# Patient Record
Sex: MALE | Race: WHITE | NOT HISPANIC OR LATINO | Employment: OTHER | URBAN - NONMETROPOLITAN AREA
[De-identification: names, ages, dates, MRNs, and addresses within clinical notes are randomized per-mention and may not be internally consistent; named-entity substitution may affect disease eponyms.]

---

## 2017-01-16 ENCOUNTER — RESULTS ENCOUNTER (OUTPATIENT)
Dept: FAMILY MEDICINE CLINIC | Facility: CLINIC | Age: 57
End: 2017-01-16

## 2017-01-16 DIAGNOSIS — E03.9 ACQUIRED HYPOTHYROIDISM: ICD-10-CM

## 2017-01-18 ENCOUNTER — RESULTS ENCOUNTER (OUTPATIENT)
Dept: ENDOCRINOLOGY | Age: 57
End: 2017-01-18

## 2017-01-18 DIAGNOSIS — E03.8 SECONDARY HYPOTHYROIDISM: ICD-10-CM

## 2017-01-24 ENCOUNTER — OFFICE VISIT (OUTPATIENT)
Dept: FAMILY MEDICINE CLINIC | Facility: CLINIC | Age: 57
End: 2017-01-24

## 2017-01-24 ENCOUNTER — RESULTS ENCOUNTER (OUTPATIENT)
Dept: FAMILY MEDICINE CLINIC | Facility: CLINIC | Age: 57
End: 2017-01-24

## 2017-01-24 VITALS
DIASTOLIC BLOOD PRESSURE: 64 MMHG | TEMPERATURE: 97.6 F | HEART RATE: 74 BPM | SYSTOLIC BLOOD PRESSURE: 110 MMHG | WEIGHT: 170.4 LBS | BODY MASS INDEX: 25.24 KG/M2 | OXYGEN SATURATION: 95 % | HEIGHT: 69 IN

## 2017-01-24 DIAGNOSIS — E55.9 VITAMIN D DEFICIENCY: ICD-10-CM

## 2017-01-24 DIAGNOSIS — J43.1 PANLOBULAR EMPHYSEMA (HCC): Primary | ICD-10-CM

## 2017-01-24 DIAGNOSIS — F41.0 PANIC ATTACK: ICD-10-CM

## 2017-01-24 DIAGNOSIS — E78.5 DYSLIPIDEMIA: ICD-10-CM

## 2017-01-24 DIAGNOSIS — M62.838 MUSCLE SPASMS OF NECK: ICD-10-CM

## 2017-01-24 PROCEDURE — 99214 OFFICE O/P EST MOD 30 MIN: CPT | Performed by: NURSE PRACTITIONER

## 2017-01-24 RX ORDER — LEVOTHYROXINE SODIUM 88 UG/1
88 TABLET ORAL DAILY
COMMUNITY
End: 2017-02-27 | Stop reason: SDUPTHER

## 2017-01-24 RX ORDER — ALBUTEROL SULFATE 90 UG/1
2 AEROSOL, METERED RESPIRATORY (INHALATION) EVERY 4 HOURS PRN
Qty: 18 G | Refills: 11 | Status: SHIPPED | OUTPATIENT
Start: 2017-01-24 | End: 2017-07-18 | Stop reason: CLARIF

## 2017-01-24 RX ORDER — BACLOFEN 10 MG/1
10 TABLET ORAL 3 TIMES DAILY
Qty: 45 TABLET | Refills: 0 | Status: SHIPPED | OUTPATIENT
Start: 2017-01-24 | End: 2017-05-05

## 2017-01-24 RX ORDER — ALBUTEROL SULFATE 2.5 MG/3ML
SOLUTION RESPIRATORY (INHALATION)
COMMUNITY
Start: 2017-01-18 | End: 2018-06-05 | Stop reason: SDUPTHER

## 2017-01-24 RX ORDER — ALPRAZOLAM 0.25 MG/1
0.25 TABLET ORAL DAILY PRN
Qty: 15 TABLET | Refills: 0 | Status: SHIPPED | OUTPATIENT
Start: 2017-01-24 | End: 2017-05-05

## 2017-01-24 NOTE — MR AVS SNAPSHOT
Nguyễn Santana   1/24/2017 11:30 AM   Office Visit    Dept Phone:  [phone]   Encounter #:  [id number]    Provider:  SHANT Garcia   Department:  [facility] FAMILY MEDICINE                Your Full Care Plan              Today's Medication Changes          These changes are accurate as of: 1/24/17 12:37 PM.  If you have any questions, ask your nurse or doctor.               New Medication(s)Ordered:     ALPRAZolam 0.25 MG tablet   Commonly known as:  XANAX   Take 1 tablet by mouth Daily As Needed for anxiety.   Started by:  SHANT Garcia       baclofen 10 MG tablet   Commonly known as:  LIORESAL   Take 1 tablet by mouth 3 (Three) Times a Day.   Started by:  SHANT Garcia         Medication(s)that have changed:     SYNTHROID 88 MCG tablet   Generic drug:  levothyroxine   Take 88 mcg by mouth Daily.   What changed:  Another medication with the same name was removed. Continue taking this medication, and follow the directions you see here.   Changed by:  SHANT Garcia         Stop taking medication(s)listed here:     Ibuprofen 200 MG capsule   Stopped by:  SHANT Garcia           ipratropium-albuterol 0.5-2.5 mg/mL nebulizer   Commonly known as:  DUO-NEB   Stopped by:  SHANT Garcia                Where to Get Your Medications      These medications were sent to [address] [address] [facility] - [phone]  - [phone]   [address] [facility] [address]     Phone:  [phone]     albuterol 108 (90 BASE) MCG/ACT inhaler    baclofen 10 MG tablet         You can get these medications from any pharmacy     Bring a paper prescription for each of these medications     ALPRAZolam 0.25 MG tablet                  Your Updated Medication List          This list is  accurate as of: 1/24/17 12:37 PM.  Always use your most recent med list.                * albuterol 2 MG tablet   Commonly known as:  PROVENTIL       * albuterol (2.5 MG/3ML) 0.083% nebulizer solution   Commonly known as:  PROVENTIL       * albuterol 108 (90 BASE) MCG/ACT inhaler   Commonly known as:  PROVENTIL HFA;VENTOLIN HFA   Inhale 2 puffs Every 4 (Four) Hours As Needed for wheezing.       ALPRAZolam 0.25 MG tablet   Commonly known as:  XANAX   Take 1 tablet by mouth Daily As Needed for anxiety.       baclofen 10 MG tablet   Commonly known as:  LIORESAL   Take 1 tablet by mouth 3 (Three) Times a Day.       budesonide-formoterol 160-4.5 MCG/ACT inhaler   Commonly known as:  SYMBICORT       Cetirizine HCl 10 MG capsule       ergocalciferol 99587 UNITS capsule   Commonly known as:  DRISDOL   Take 1 capsule by mouth 1 (one) time per week.       fluticasone-salmeterol 250-50 MCG/DOSE DISKUS   Commonly known as:  ADVAIR       liothyronine 25 MCG tablet   Commonly known as:  CYTOMEL   Take 1/2 tab twice daily       montelukast 10 MG tablet   Commonly known as:  SINGULAIR       Nebulizer device       promethazine-dextromethorphan 6.25-15 MG/5ML syrup   Commonly known as:  PROMETHAZINE-DM       simvastatin 20 MG tablet   Commonly known as:  ZOCOR   Take 1 tablet by mouth every evening.       SPIRIVA HANDIHALER 18 MCG per inhalation capsule   Generic drug:  tiotropium       SYNTHROID 88 MCG tablet   Generic drug:  levothyroxine       * Notice:  This list has 3 medication(s) that are the same as other medications prescribed for you. Read the directions carefully, and ask your doctor or other care provider to review them with you.            We Performed the Following     Compliance Drug Analysis, Ur       You Were Diagnosed With        Codes Comments    Panlobular emphysema    -  Primary ICD-10-CM: J43.1  ICD-9-CM: 492.8     Panic attack     ICD-10-CM: F41.0  ICD-9-CM: 300.01     Muscle spasms of neck     ICD-10-CM:  M62.838  ICD-9-CM: 728.85       Instructions     None    Patient Instructions History      Upcoming Appointments     Visit Type Date Time Department    OFFICE VISIT 2017 11:30 AM [facility]    [facility] 2017 10:10 AM [facility]    OFFICE VISIT 2017  2:40 PM [facility]    [facility] 2017 10:00 AM [facility]    OFFICE VISIT 2017  8:40 AM [facility]      MyChart Signup     [facility] [employer] allows you to send messages to your doctor, view your test results, renew your prescriptions, schedule appointments, and more. To sign up, go to [employer] and click on the Sign Up Now link in the New User? box. Enter your [employer] Activation Code exactly as it appears below along with the last four digits of your Social Security Number and your Date of Birth () to complete the sign-up process. If you do not sign up before the expiration date, you must request a new code.    [employer] Activation Code: [email or web address]  Expires: 2017 12:37 PM    If you have questions, you can email [email or web address] or call [phone] to talk to our [employer] staff. Remember, [employer] is NOT to be used for urgent needs. For medical emergencies, dial 911.               Other Info from Your Visit           Your Appointments     2017 10:10 AM EDT   [facility]   [facility] ENDOCRINOLOGY (--)    [address] [place] [address] [phone]            2017  2:40 PM EDT   Office Visit with Lion West MD   [facility] ENDOCRINOLOGY (--)    [address] [place] [address] [phone]           Arrive 15 minutes prior to appointment.            2017 10:00 AM EDT   [facility], [facility]   [facility] ENDOCRINOLOGY (--)    [address] [address] [phone]     "        Jul 19, 2017  8:40 AM EDT   Office Visit with SHANT Espinal   [facility] ENDOCRINOLOGY (--)    [address]  [facility] [address] [phone]           Arrive 15 minutes prior to appointment.              Allergies     No Known Allergies      Reason for Visit     Med Refill COPD, TSH, Vitamin D, and Cholesterol      Vital Signs     Blood Pressure Pulse Temperature Height Weight Oxygen Saturation    110/64 (BP Location: Right arm) 74 97.6 °F (36.4 °C) (Oral) 69\" (175.3 cm) 170 lb 6.4 oz (77.3 kg) 95%    Body Mass Index Smoking Status                25.16 kg/m2 Former Smoker          Problems and Diagnoses Noted     Chronic airway obstruction    Muscle spasms of neck    Panic attack        "

## 2017-01-24 NOTE — PROGRESS NOTES
Subjective   Nguyễn Santana is a 56 y.o. male. Patient is here for medication check/refill for COPD and TSH. He is fasting today. He states that his Pulmonologist has him on Oxygen only at night, this was done around thanksgiving. He seen him again last week and he put him back on the Albuterol 2 mg 4 times daily. Albuterol Nebulizer 2.5 mg 4 times daily. He has recently been diagnosed with Glaucoma.     History of Present Illness 56-year-old  male presents to the office today to follow-up on long-term medications needed for his COPD. Patient states his thyroid is being followed by his endocrinologist who is already ordered labs to be drawn at a later date. Patient is fasting today. States that his pulmonologist has taken him off his continuous nasal cannula O2 except for at night when he sleeps. He has been without daytime O2 since Thanksgiving. He did state that his albuterol had been increased to 2 mg 4 times a day and that he is using an albuterol nebulizer 2.5 mg 4 times a day. This keeps him breathing comfortably throughout the waking hours. Also shared with me that he has been recently diagnosed with glaucoma and is treating it with drops. States the main reason he is here is because he has started having panic attacks whenever he is exposed to upsetting odors such as gasoline, diesel fuel, and certain perfumes. States he becomes severely short of air breathing rapidly and sweating profusely. Would like a rapid acting medication that he can use sparingly during these attacks. Has had 2 attacks during the past 6 weeks. Patient states he does not want to take medicine on a daily basis unless absolutely necessary. Explained to him the need for medication agreement, Avinash and drug testing if fast acting narcotic was to be used. Also explained that dosing would be only a few tablets and that it must last greater than 30 days. He agreed to all these conditions.    The following portions of the  patient's history were reviewed and updated as appropriate: allergies, current medications, past family history, past medical history, past social history, past surgical history and problem list.    Review of Systems   Constitutional:        Vitamin D Deficiency.   HENT:        Muscle spasms in back of neck greater on the right side than left.   Respiratory:        COPD.   Cardiovascular:        Dyslipidemia.    Endocrine:        Hypothyroidism.   Psychiatric/Behavioral:        New-onset of panic attacks associated with the smell of gasoline, diesel fuel and certain perfumes   All other systems reviewed and are negative.      Objective   Physical Exam   Constitutional: He is oriented to person, place, and time. He appears well-developed and well-nourished.   HENT:   Head: Normocephalic and atraumatic.   Muscle spasm in right side of neck causing tense muscle running from base of skull down to scapula.   Eyes: EOM are normal. Pupils are equal, round, and reactive to light.   Neck: Normal range of motion.   Cardiovascular: Normal rate and regular rhythm.    Pulmonary/Chest: Effort normal.   Distant shallow breath sounds throughout lung fields   Abdominal: Soft. Bowel sounds are normal. He exhibits no distension.   Musculoskeletal: Normal range of motion.   Neurological: He is alert and oriented to person, place, and time.   Skin: Skin is warm and dry.   Psychiatric: He has a normal mood and affect. His behavior is normal. Judgment and thought content normal.   Nursing note and vitals reviewed.      Assessment/Plan   Nguyễn was seen today for med refill.    Diagnoses and all orders for this visit:    Panlobular emphysema  -     albuterol (PROVENTIL HFA;VENTOLIN HFA) 108 (90 BASE) MCG/ACT inhaler; Inhale 2 puffs Every 4 (Four) Hours As Needed for wheezing.    Panic attack  -     Compliance Drug Analysis, Ur; Future  -     ALPRAZolam (XANAX) 0.25 MG tablet; Take 1 tablet by mouth Daily As Needed for anxiety.  -      Compliance Drug Analysis, Ur    Muscle spasms of neck  -     baclofen (LIORESAL) 10 MG tablet; Take 1 tablet by mouth 3 (Three) Times a Day.    Vitamin D deficiency  -     Vitamin D 25 Hydroxy; Future    Dyslipidemia  -     Lipid Panel; Future      Kaspar drawn, urine test collected and medication agreement signed. Patient to use Xanax sparingly during times of panic. Discussed with patient that if he needs to use the Xanax on a more regular basis we will need to switch to a daily medication to help control anxiety. Anxiety is a normal part of advanced COPD due to air hunger. Renewed medication for Proventil for his shortness of breath and wheezing. Baclofen ordered for patient's muscle spasms in the back of his neck associated with frequent powerful coughing. Encouraged to return tomorrow for fasting labs. Patient left after giving urine sample and I called him at home to remind him of need for labs. To continue all medications as requested, eat a well-balanced diet with plenty of fruits and vegetables, drinking six-day glasses of water a day and ambulating as able. Discussed at length the need for adequate hydration due to his COPD.

## 2017-01-25 LAB
25(OH)D3+25(OH)D2 SERPL-MCNC: 45.9 NG/ML (ref 30–100)
CHOLEST SERPL-MCNC: 214 MG/DL (ref 0–200)
HDLC SERPL-MCNC: 48 MG/DL (ref 40–60)
LDLC SERPL CALC-MCNC: 138 MG/DL (ref 0–100)
TRIGL SERPL-MCNC: 141 MG/DL (ref 0–150)
VLDLC SERPL CALC-MCNC: 28.2 MG/DL (ref 5–40)

## 2017-01-28 LAB
DRUGS UR: NORMAL
Lab: NORMAL

## 2017-02-27 ENCOUNTER — TELEPHONE (OUTPATIENT)
Dept: ENDOCRINOLOGY | Age: 57
End: 2017-02-27

## 2017-02-27 RX ORDER — LEVOTHYROXINE SODIUM 88 UG/1
88 TABLET ORAL DAILY
Qty: 30 TABLET | Refills: 2 | Status: SHIPPED | OUTPATIENT
Start: 2017-02-27 | End: 2017-04-19

## 2017-02-27 NOTE — TELEPHONE ENCOUNTER
----- Message from Licha Ragland sent at 2/27/2017 10:21 AM EST -----  Contact: PATIENT  THE PATIENT TAKES SYNTHROID 88 MCG 1 TAB 1X DAILY 30 COUNT. HE IS ALMOST OUT AND HE DOESN'T HAVE ANOTHER APPT. UNTIL April WITH DR. PRICE. HE IS ASKING FOR A REFILL TO BE SENT TO HIS PHARMACY FOR THIS MEDICATION TO HOLD HIM UNTIL HIS APPT. ON 4/19/17. PLEASE SEND THIS TO:  SHIREEN PENA  [address] [facility]   [phone] (Phone)  [phone] (Fax)

## 2017-04-07 DIAGNOSIS — R53.82 CHRONIC FATIGUE: ICD-10-CM

## 2017-04-07 DIAGNOSIS — E03.8 OTHER SPECIFIED HYPOTHYROIDISM: Primary | ICD-10-CM

## 2017-04-07 DIAGNOSIS — E78.5 DYSLIPIDEMIA: ICD-10-CM

## 2017-04-07 DIAGNOSIS — E55.9 VITAMIN D DEFICIENCY: Primary | ICD-10-CM

## 2017-04-12 ENCOUNTER — LAB (OUTPATIENT)
Dept: ENDOCRINOLOGY | Age: 57
End: 2017-04-12

## 2017-04-12 DIAGNOSIS — R53.82 CHRONIC FATIGUE: ICD-10-CM

## 2017-04-12 DIAGNOSIS — E78.5 DYSLIPIDEMIA: ICD-10-CM

## 2017-04-12 DIAGNOSIS — E55.9 VITAMIN D DEFICIENCY: ICD-10-CM

## 2017-04-12 DIAGNOSIS — E03.8 OTHER SPECIFIED HYPOTHYROIDISM: ICD-10-CM

## 2017-04-13 LAB — 25(OH)D3+25(OH)D2 SERPL-MCNC: 38.5 NG/ML (ref 30–100)

## 2017-04-18 LAB
ACTH PLAS-MCNC: 26.7 PG/ML (ref 7.2–63.3)
ALBUMIN SERPL-MCNC: 4.5 G/DL (ref 3.5–5.2)
ALBUMIN/GLOB SERPL: 1.7 G/DL
ALP SERPL-CCNC: 89 U/L (ref 39–117)
ALT SERPL-CCNC: 15 U/L (ref 1–41)
AST SERPL-CCNC: 20 U/L (ref 1–40)
BILIRUB SERPL-MCNC: 0.7 MG/DL (ref 0.1–1.2)
BUN SERPL-MCNC: 14 MG/DL (ref 6–20)
BUN/CREAT SERPL: 12.1 (ref 7–25)
CALCIUM SERPL-MCNC: 9.7 MG/DL (ref 8.6–10.5)
CHLORIDE SERPL-SCNC: 101 MMOL/L (ref 98–107)
CHOLEST SERPL-MCNC: 207 MG/DL (ref 0–200)
CO2 SERPL-SCNC: 22 MMOL/L (ref 22–29)
CONV COMMENT: ABNORMAL
CORTIS SERPL-MCNC: 9.5 UG/DL
CREAT SERPL-MCNC: 1.16 MG/DL (ref 0.76–1.27)
ERYTHROCYTE [DISTWIDTH] IN BLOOD BY AUTOMATED COUNT: 13.2 % (ref 11.5–14.5)
GLOBULIN SER CALC-MCNC: 2.6 GM/DL
GLUCOSE SERPL-MCNC: 85 MG/DL (ref 65–99)
HCT VFR BLD AUTO: 43.8 % (ref 40.4–52.2)
HDLC SERPL-MCNC: 46 MG/DL (ref 40–60)
HGB BLD-MCNC: 14.9 G/DL (ref 13.7–17.6)
LDLC SERPL CALC-MCNC: 135 MG/DL (ref 0–100)
MCH RBC QN AUTO: 31.9 PG (ref 27–32.7)
MCHC RBC AUTO-ENTMCNC: 34 G/DL (ref 32.6–36.4)
MCV RBC AUTO: 93.8 FL (ref 79.8–96.2)
PLATELET # BLD AUTO: 276 10*3/MM3 (ref 140–500)
POTASSIUM SERPL-SCNC: 4.5 MMOL/L (ref 3.5–5.2)
PROT SERPL-MCNC: 7.1 G/DL (ref 6–8.5)
RBC # BLD AUTO: 4.67 10*6/MM3 (ref 4.6–6)
SHBG SERPL-SCNC: 97.6 NMOL/L (ref 19.3–76.4)
SODIUM SERPL-SCNC: 140 MMOL/L (ref 136–145)
T3FREE SERPL-MCNC: 2.8 PG/ML (ref 2–4.4)
T4 FREE SERPL-MCNC: 1.39 NG/DL (ref 0.93–1.7)
T4 SERPL-MCNC: 7.88 MCG/DL (ref 4.5–11.7)
TESTOST FREE SERPL-MCNC: 9.2 PG/ML (ref 7.2–24)
TESTOST SERPL-MCNC: 690 NG/DL (ref 348–1197)
THYROGLOB AB SERPL-ACNC: 1.9 IU/ML
THYROGLOB SERPL-MCNC: <2 NG/ML
THYROGLOB SERPL-MCNC: ABNORMAL NG/ML
TRIGL SERPL-MCNC: 131 MG/DL (ref 0–150)
TSH SERPL DL<=0.005 MIU/L-ACNC: 3.78 MIU/ML (ref 0.27–4.2)
URATE SERPL-MCNC: 4.6 MG/DL (ref 3.4–7)
VLDLC SERPL CALC-MCNC: 26.2 MG/DL (ref 5–40)
WBC # BLD AUTO: 9.24 10*3/MM3 (ref 4.5–10.7)

## 2017-04-19 ENCOUNTER — OFFICE VISIT (OUTPATIENT)
Dept: ENDOCRINOLOGY | Age: 57
End: 2017-04-19

## 2017-04-19 VITALS
BODY MASS INDEX: 25.53 KG/M2 | RESPIRATION RATE: 16 BRPM | SYSTOLIC BLOOD PRESSURE: 108 MMHG | DIASTOLIC BLOOD PRESSURE: 60 MMHG | HEIGHT: 69 IN | WEIGHT: 172.4 LBS

## 2017-04-19 DIAGNOSIS — E78.5 DYSLIPIDEMIA: ICD-10-CM

## 2017-04-19 DIAGNOSIS — E55.9 VITAMIN D DEFICIENCY: ICD-10-CM

## 2017-04-19 DIAGNOSIS — E03.8 OTHER SPECIFIED HYPOTHYROIDISM: Primary | ICD-10-CM

## 2017-04-19 PROCEDURE — 99214 OFFICE O/P EST MOD 30 MIN: CPT | Performed by: INTERNAL MEDICINE

## 2017-04-19 RX ORDER — LEVOTHYROXINE SODIUM 112 MCG
112 TABLET ORAL DAILY
Qty: 30 TABLET | Refills: 5 | Status: SHIPPED | OUTPATIENT
Start: 2017-04-19 | End: 2017-11-28

## 2017-04-19 RX ORDER — ROSUVASTATIN CALCIUM 40 MG/1
40 TABLET, COATED ORAL DAILY
Qty: 90 TABLET | Refills: 3 | Status: SHIPPED | OUTPATIENT
Start: 2017-04-19 | End: 2017-04-21 | Stop reason: SDUPTHER

## 2017-04-19 NOTE — PROGRESS NOTES
"Subjective   Nguynễ Santana is a 56 y.o. male seen for follow up for hypothyroidism, dyslipidemia, vit d deficiency, lab review. Patient states that Dahlia started him on Liothyronine and it did not work so he quit taking the medication. He denies any problems or concerns.     History of Present Illness this is a 56-year-old gentleman known patient with hypothyroidism and dyslipidemia and vitamin D deficiency and whole host of other upper respiratory and lung problems.  Over the course of last 6 months he has had no significant health problems for which to go to the emergency room or hospital.    /60  Resp 16  Ht 69\" (175.3 cm)  Wt 172 lb 6.4 oz (78.2 kg)  BMI 25.46 kg/m2     No Known Allergies    Current Outpatient Prescriptions:   •  albuterol (PROVENTIL HFA;VENTOLIN HFA) 108 (90 BASE) MCG/ACT inhaler, Inhale 2 puffs Every 4 (Four) Hours As Needed for wheezing., Disp: 18 g, Rfl: 11  •  albuterol (PROVENTIL) (2.5 MG/3ML) 0.083% nebulizer solution, , Disp: , Rfl:   •  albuterol (PROVENTIL) 2 MG tablet, 4 (Four) Times a Day., Disp: , Rfl: 5  •  ALPRAZolam (XANAX) 0.25 MG tablet, Take 1 tablet by mouth Daily As Needed for anxiety., Disp: 15 tablet, Rfl: 0  •  baclofen (LIORESAL) 10 MG tablet, Take 1 tablet by mouth 3 (Three) Times a Day., Disp: 45 tablet, Rfl: 0  •  budesonide-formoterol (SYMBICORT) 160-4.5 MCG/ACT inhaler, Inhale 2 puffs 2 (two) times a day., Disp: , Rfl:   •  Cetirizine HCl 10 MG capsule, Take by mouth., Disp: , Rfl:   •  ergocalciferol (DRISDOL) 55158 UNITS capsule, Take 1 capsule by mouth 1 (one) time per week., Disp: 13 capsule, Rfl: 3  •  fluticasone-salmeterol (ADVAIR) 250-50 MCG/DOSE DISKUS, 1 puff 2 (two) times a day., Disp: , Rfl:   •  levothyroxine (SYNTHROID) 88 MCG tablet, Take 1 tablet by mouth Daily., Disp: 30 tablet, Rfl: 2  •  montelukast (SINGULAIR) 10 MG tablet, Take 1 tablet by mouth daily., Disp: , Rfl:   •  promethazine-dextromethorphan (PROMETHAZINE-DM) 6.25-15 " MG/5ML syrup, Take by mouth. Take 5 ML every 4-6 hours as needed for cough, Disp: , Rfl:   •  Respiratory Therapy Supplies (NEBULIZER) device, , Disp: , Rfl:   •  simvastatin (ZOCOR) 20 MG tablet, Take 1 tablet by mouth every evening., Disp: 30 tablet, Rfl: 11  •  SPIRIVA HANDIHALER 18 MCG per inhalation capsule, , Disp: , Rfl: 5      The following portions of the patient's history were reviewed and updated as appropriate: allergies, current medications, past family history, past medical history, past social history, past surgical history and problem list.    Review of Systems   Constitutional: Negative.    HENT: Negative.    Eyes: Negative.    Respiratory: Negative.    Cardiovascular: Negative.    Gastrointestinal: Negative.    Endocrine: Negative.    Genitourinary: Negative.    Musculoskeletal: Negative.    Skin: Negative.    Allergic/Immunologic: Negative.    Neurological: Negative.    Hematological: Negative.    Psychiatric/Behavioral: Negative.        Objective   Physical Exam   Constitutional: He is oriented to person, place, and time. He appears well-developed and well-nourished. No distress.   Patient is on nasal O2.   HENT:   Head: Normocephalic and atraumatic.   Right Ear: External ear normal.   Left Ear: External ear normal.   Nose: Nose normal.   Mouth/Throat: Oropharynx is clear and moist. No oropharyngeal exudate.   Eyes: Conjunctivae and EOM are normal. Pupils are equal, round, and reactive to light. Right eye exhibits no discharge. Left eye exhibits no discharge. No scleral icterus.   Neck: Normal range of motion. Neck supple. No JVD present. No tracheal deviation present. Thyromegaly present.   Somewhat a nonhomogeneous enlargement of his thyroid to about 1-1/2 to twice normal size.   Cardiovascular: Normal rate, regular rhythm, normal heart sounds and intact distal pulses.  Exam reveals no gallop and no friction rub.    No murmur heard.  Pulmonary/Chest: Effort normal and breath sounds normal. No  stridor. No respiratory distress. He has no wheezes. He has no rales. He exhibits no tenderness.   Abdominal: Soft. Bowel sounds are normal. He exhibits no distension and no mass. There is no tenderness. There is no rebound and no guarding. No hernia.   Musculoskeletal: Normal range of motion. He exhibits no tenderness or deformity.   Lymphadenopathy:     He has no cervical adenopathy.   Neurological: He is alert and oriented to person, place, and time. He has normal reflexes. He displays normal reflexes. No cranial nerve deficit. He exhibits normal muscle tone. Coordination normal.   Skin: Skin is warm and dry. No rash noted. He is not diaphoretic. No erythema. No pallor.   Psychiatric: He has a normal mood and affect. His behavior is normal. Judgment and thought content normal.   Nursing note and vitals reviewed.     Results for orders placed or performed in visit on 04/12/17   Comprehensive Metabolic Panel   Result Value Ref Range    Glucose 85 65 - 99 mg/dL    BUN 14 6 - 20 mg/dL    Creatinine 1.16 0.76 - 1.27 mg/dL    eGFR Non African Am 66 >60 mL/min/1.73    eGFR African Am 80 >60 mL/min/1.73    BUN/Creatinine Ratio 12.1 7.0 - 25.0    Sodium 140 136 - 145 mmol/L    Potassium 4.5 3.5 - 5.2 mmol/L    Chloride 101 98 - 107 mmol/L    Total CO2 22.0 22.0 - 29.0 mmol/L    Calcium 9.7 8.6 - 10.5 mg/dL    Total Protein 7.1 6.0 - 8.5 g/dL    Albumin 4.50 3.50 - 5.20 g/dL    Globulin 2.6 gm/dL    A/G Ratio 1.7 g/dL    Total Bilirubin 0.7 0.1 - 1.2 mg/dL    Alkaline Phosphatase 89 39 - 117 U/L    AST (SGOT) 20 1 - 40 U/L    ALT (SGPT) 15 1 - 41 U/L   T3, Free   Result Value Ref Range    T3, Free 2.8 2.0 - 4.4 pg/mL   T4 & TSH ([facility])   Result Value Ref Range    TSH 3.780 0.270 - 4.200 mIU/mL    T4, Total 7.88 4.50 - 11.70 mcg/dL   T4, Free   Result Value Ref Range    Free T4 1.39 0.93 - 1.70 ng/dL   Lipid Panel   Result Value Ref Range    Total Cholesterol 207 (H) 0 - 200 mg/dL    Triglycerides 131 0 - 150 mg/dL    HDL  Cholesterol 46 40 - 60 mg/dL    VLDL Cholesterol 26.2 5 - 40 mg/dL    LDL Cholesterol  135 (H) 0 - 100 mg/dL   CBC (No Diff)   Result Value Ref Range    WBC 9.24 4.50 - 10.70 10*3/mm3    RBC 4.67 4.60 - 6.00 10*6/mm3    Hemoglobin 14.9 13.7 - 17.6 g/dL    Hematocrit 43.8 40.4 - 52.2 %    MCV 93.8 79.8 - 96.2 fL    MCH 31.9 27.0 - 32.7 pg    MCHC 34.0 32.6 - 36.4 g/dL    RDW 13.2 11.5 - 14.5 %    Platelets 276 140 - 500 10*3/mm3   Comprehensive Thyroglobulin   Result Value Ref Range    Thyroglobulin Ab 1.9 (H) IU/mL    Thyroglobulin Comment ng/mL    Thyroglobulin (TG-ELIGIO) <2.0 ng/mL   TestT+TestF+SHBG   Result Value Ref Range    Testosterone, Total 690 348 - 1197 ng/dL    Comment Comment     Testosterone, Free 9.2 7.2 - 24.0 pg/mL    Sex Hormone Binding Globulin 97.6 (H) 19.3 - 76.4 nmol/L   ACTH   Result Value Ref Range    ACTH 26.7 7.2 - 63.3 pg/mL   Cortisol   Result Value Ref Range    Cortisol 9.5 ug/dL   Uric Acid   Result Value Ref Range    Uric Acid 4.6 3.4 - 7.0 mg/dL           Assessment/Plan   Diagnoses and all orders for this visit:    Other specified hypothyroidism  -     T3, Free; Future  -     T4 & TSH ([facility]); Future  -     T4, Free; Future  -     Thyroglobulin With Anti-TG; Future  -     Uric Acid; Future  -     Vitamin D 25 Hydroxy; Future  -     Comprehensive Metabolic Panel; Future  -     Lipid Panel; Future    Vitamin D deficiency  -     T3, Free; Future  -     T4 & TSH ([facility]); Future  -     T4, Free; Future  -     Thyroglobulin With Anti-TG; Future  -     Uric Acid; Future  -     Vitamin D 25 Hydroxy; Future  -     Comprehensive Metabolic Panel; Future  -     Lipid Panel; Future    Dyslipidemia  -     T3, Free; Future  -     T4 & TSH ([facility]); Future  -     T4, Free; Future  -     Thyroglobulin With Anti-TG; Future  -     Uric Acid; Future  -     Vitamin D 25 Hydroxy; Future  -     Comprehensive Metabolic Panel; Future  -     Lipid Panel; Future    Other orders  -     SYNTHROID 112 MCG  tablet; Take 1 tablet by mouth Daily. On empty stomach  -     rosuvastatin (CRESTOR) 40 MG tablet; Take 1 tablet by mouth Daily.               In summary I saw and examined this 56-year-old gentleman for above-mentioned problems.  I reviewed his laboratory evaluation of 04/12/2017 and provided him and his wife who was present during this office visit with a hard copy of it.  Overall he is clinically and metabolically stable however his TSH level is high enough to increase his dose of Synthroid to 112 µg daily.  Also his total cholesterol and LDL remain elevated and therefore I am going to stop simvastatin is started him on Crestor 40 mg daily.  He will continue rest of his prescriptions.  I will see him in 6 months or sooner if needed with laboratory evaluation prior to each office visit.

## 2017-04-21 RX ORDER — ROSUVASTATIN CALCIUM 40 MG/1
40 TABLET, COATED ORAL DAILY
Qty: 90 TABLET | Refills: 3 | Status: SHIPPED | OUTPATIENT
Start: 2017-04-21 | End: 2017-09-05 | Stop reason: SDUPTHER

## 2017-05-05 ENCOUNTER — OFFICE VISIT (OUTPATIENT)
Dept: FAMILY MEDICINE CLINIC | Facility: CLINIC | Age: 57
End: 2017-05-05

## 2017-05-05 VITALS
SYSTOLIC BLOOD PRESSURE: 122 MMHG | TEMPERATURE: 97.6 F | BODY MASS INDEX: 25.36 KG/M2 | DIASTOLIC BLOOD PRESSURE: 86 MMHG | WEIGHT: 171.2 LBS | HEIGHT: 69 IN | OXYGEN SATURATION: 97 % | HEART RATE: 74 BPM

## 2017-05-05 DIAGNOSIS — J43.1 PANLOBULAR EMPHYSEMA (HCC): ICD-10-CM

## 2017-05-05 DIAGNOSIS — Z00.00 HEALTHCARE MAINTENANCE: Primary | ICD-10-CM

## 2017-05-05 PROCEDURE — 99213 OFFICE O/P EST LOW 20 MIN: CPT | Performed by: NURSE PRACTITIONER

## 2017-05-05 RX ORDER — ERGOCALCIFEROL 1.25 MG/1
50000 CAPSULE ORAL WEEKLY
COMMUNITY
End: 2017-05-12 | Stop reason: SDUPTHER

## 2017-05-12 RX ORDER — ERGOCALCIFEROL 1.25 MG/1
50000 CAPSULE ORAL WEEKLY
Qty: 24 CAPSULE | Refills: 0 | Status: SHIPPED | OUTPATIENT
Start: 2017-05-12 | End: 2017-07-28 | Stop reason: SDUPTHER

## 2017-05-12 RX ORDER — ERGOCALCIFEROL 1.25 MG/1
CAPSULE ORAL
Qty: 4 CAPSULE | Refills: 2 | OUTPATIENT
Start: 2017-05-12

## 2017-07-21 DIAGNOSIS — J43.9 PULMONARY EMPHYSEMA, UNSPECIFIED EMPHYSEMA TYPE (HCC): Primary | ICD-10-CM

## 2017-07-21 DIAGNOSIS — J44.9 CHRONIC OBSTRUCTIVE PULMONARY DISEASE, UNSPECIFIED COPD TYPE (HCC): Primary | ICD-10-CM

## 2017-07-21 RX ORDER — ALBUTEROL SULFATE 90 UG/1
2 AEROSOL, METERED RESPIRATORY (INHALATION) EVERY 4 HOURS PRN
Qty: 18 G | Refills: 6 | Status: SHIPPED | OUTPATIENT
Start: 2017-07-21 | End: 2017-09-05

## 2017-07-21 RX ORDER — ALBUTEROL SULFATE 90 UG/1
2 AEROSOL, METERED RESPIRATORY (INHALATION) EVERY 4 HOURS PRN
Qty: 18 G | Refills: 6 | Status: SHIPPED | OUTPATIENT
Start: 2017-07-21 | End: 2017-07-21

## 2017-07-28 RX ORDER — ERGOCALCIFEROL 1.25 MG/1
CAPSULE ORAL
Qty: 12 CAPSULE | Refills: 0 | Status: SHIPPED | OUTPATIENT
Start: 2017-07-28 | End: 2017-10-07 | Stop reason: SDUPTHER

## 2017-09-05 ENCOUNTER — OFFICE VISIT (OUTPATIENT)
Dept: FAMILY MEDICINE CLINIC | Facility: CLINIC | Age: 57
End: 2017-09-05

## 2017-09-05 VITALS
HEIGHT: 69 IN | SYSTOLIC BLOOD PRESSURE: 120 MMHG | HEART RATE: 62 BPM | TEMPERATURE: 97.8 F | WEIGHT: 168.8 LBS | DIASTOLIC BLOOD PRESSURE: 68 MMHG | OXYGEN SATURATION: 95 % | BODY MASS INDEX: 25 KG/M2

## 2017-09-05 DIAGNOSIS — Z00.00 HEALTHCARE MAINTENANCE: ICD-10-CM

## 2017-09-05 DIAGNOSIS — E78.5 DYSLIPIDEMIA: ICD-10-CM

## 2017-09-05 DIAGNOSIS — J44.9 CHRONIC OBSTRUCTIVE PULMONARY DISEASE, UNSPECIFIED COPD TYPE (HCC): Primary | ICD-10-CM

## 2017-09-05 PROCEDURE — 99213 OFFICE O/P EST LOW 20 MIN: CPT | Performed by: NURSE PRACTITIONER

## 2017-09-05 RX ORDER — ALBUTEROL SULFATE 90 UG/1
2 AEROSOL, METERED RESPIRATORY (INHALATION) EVERY 4 HOURS PRN
COMMUNITY
End: 2017-12-05 | Stop reason: SDUPTHER

## 2017-09-05 RX ORDER — ROSUVASTATIN CALCIUM 40 MG/1
40 TABLET, COATED ORAL DAILY
Qty: 30 TABLET | Refills: 6 | Status: SHIPPED | OUTPATIENT
Start: 2017-09-05 | End: 2017-11-28 | Stop reason: SDUPTHER

## 2017-09-05 RX ORDER — TIOTROPIUM BROMIDE INHALATION SPRAY 3.12 UG/1
SPRAY, METERED RESPIRATORY (INHALATION)
COMMUNITY
Start: 2017-08-29 | End: 2018-06-05
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
## 2017-10-09 RX ORDER — ERGOCALCIFEROL 1.25 MG/1
CAPSULE ORAL
Qty: 12 CAPSULE | Refills: 0 | Status: SHIPPED | OUTPATIENT
Start: 2017-10-09 | End: 2017-11-28 | Stop reason: SDUPTHER

## 2017-10-12 ENCOUNTER — RESULTS ENCOUNTER (OUTPATIENT)
Dept: ENDOCRINOLOGY | Age: 57
End: 2017-10-12

## 2017-10-12 DIAGNOSIS — E78.5 DYSLIPIDEMIA: ICD-10-CM

## 2017-10-12 DIAGNOSIS — E55.9 VITAMIN D DEFICIENCY: ICD-10-CM

## 2017-10-12 DIAGNOSIS — E03.8 OTHER SPECIFIED HYPOTHYROIDISM: ICD-10-CM

## 2017-11-06 ENCOUNTER — RESULTS ENCOUNTER (OUTPATIENT)
Dept: FAMILY MEDICINE CLINIC | Facility: CLINIC | Age: 57
End: 2017-11-06

## 2017-11-06 DIAGNOSIS — Z00.00 HEALTHCARE MAINTENANCE: ICD-10-CM

## 2017-11-14 ENCOUNTER — RESULTS ENCOUNTER (OUTPATIENT)
Dept: FAMILY MEDICINE CLINIC | Facility: CLINIC | Age: 57
End: 2017-11-14

## 2017-11-14 DIAGNOSIS — Z00.00 HEALTHCARE MAINTENANCE: ICD-10-CM

## 2017-11-21 LAB
25(OH)D3+25(OH)D2 SERPL-MCNC: 44.8 NG/ML (ref 30–100)
ALBUMIN SERPL-MCNC: 4.5 G/DL (ref 3.5–5.2)
ALBUMIN/GLOB SERPL: 1.9 G/DL
ALP SERPL-CCNC: 81 U/L (ref 39–117)
ALT SERPL-CCNC: 11 U/L (ref 1–41)
AST SERPL-CCNC: 18 U/L (ref 1–40)
BILIRUB SERPL-MCNC: 0.7 MG/DL (ref 0.1–1.2)
BUN SERPL-MCNC: 18 MG/DL (ref 6–20)
BUN/CREAT SERPL: 15 (ref 7–25)
CALCIUM SERPL-MCNC: 9.4 MG/DL (ref 8.6–10.5)
CHLORIDE SERPL-SCNC: 100 MMOL/L (ref 98–107)
CHOLEST SERPL-MCNC: 225 MG/DL (ref 0–200)
CO2 SERPL-SCNC: 26 MMOL/L (ref 22–29)
CREAT SERPL-MCNC: 1.2 MG/DL (ref 0.76–1.27)
GFR SERPLBLD CREATININE-BSD FMLA CKD-EPI: 63 ML/MIN/1.73
GFR SERPLBLD CREATININE-BSD FMLA CKD-EPI: 76 ML/MIN/1.73
GLOBULIN SER CALC-MCNC: 2.4 GM/DL
GLUCOSE SERPL-MCNC: 86 MG/DL (ref 65–99)
HDLC SERPL-MCNC: 58 MG/DL (ref 40–60)
LDLC SERPL CALC-MCNC: 146 MG/DL (ref 0–100)
POTASSIUM SERPL-SCNC: 4.8 MMOL/L (ref 3.5–5.2)
PROT SERPL-MCNC: 6.9 G/DL (ref 6–8.5)
SODIUM SERPL-SCNC: 141 MMOL/L (ref 136–145)
T3FREE SERPL-MCNC: 2.7 PG/ML (ref 2–4.4)
T4 FREE SERPL-MCNC: 1.24 NG/DL (ref 0.93–1.7)
T4 SERPL-MCNC: 6.55 MCG/DL (ref 4.5–11.7)
THYROGLOB AB SERPL-ACNC: 1.6 IU/ML (ref 0–0.9)
THYROGLOB SERPL-MCNC: 2.5 NG/ML
TRIGL SERPL-MCNC: 106 MG/DL (ref 0–150)
TSH SERPL DL<=0.005 MIU/L-ACNC: 3.94 MIU/ML (ref 0.27–4.2)
URATE SERPL-MCNC: 5.2 MG/DL (ref 3.4–7)
VLDLC SERPL CALC-MCNC: 21.2 MG/DL (ref 5–40)

## 2017-11-28 ENCOUNTER — OFFICE VISIT (OUTPATIENT)
Dept: ENDOCRINOLOGY | Age: 57
End: 2017-11-28

## 2017-11-28 VITALS
BODY MASS INDEX: 25.25 KG/M2 | DIASTOLIC BLOOD PRESSURE: 82 MMHG | WEIGHT: 171 LBS | SYSTOLIC BLOOD PRESSURE: 116 MMHG | RESPIRATION RATE: 16 BRPM

## 2017-11-28 DIAGNOSIS — E06.3 HYPOTHYROIDISM DUE TO HASHIMOTO'S THYROIDITIS: Primary | ICD-10-CM

## 2017-11-28 DIAGNOSIS — R53.82 CHRONIC FATIGUE: ICD-10-CM

## 2017-11-28 DIAGNOSIS — E55.9 VITAMIN D DEFICIENCY: ICD-10-CM

## 2017-11-28 DIAGNOSIS — E78.5 DYSLIPIDEMIA: ICD-10-CM

## 2017-11-28 DIAGNOSIS — E03.8 HYPOTHYROIDISM DUE TO HASHIMOTO'S THYROIDITIS: Primary | ICD-10-CM

## 2017-11-28 PROCEDURE — 99214 OFFICE O/P EST MOD 30 MIN: CPT | Performed by: INTERNAL MEDICINE

## 2017-11-28 RX ORDER — ERGOCALCIFEROL 1.25 MG/1
50000 CAPSULE ORAL
Qty: 13 CAPSULE | Refills: 3 | Status: SHIPPED | OUTPATIENT
Start: 2017-11-28 | End: 2017-12-04 | Stop reason: SDUPTHER

## 2017-11-28 RX ORDER — EZETIMIBE 10 MG/1
10 TABLET ORAL DAILY
Qty: 30 TABLET | Refills: 11 | Status: SHIPPED | OUTPATIENT
Start: 2017-11-28 | End: 2018-06-08 | Stop reason: SINTOL

## 2017-11-28 RX ORDER — LEVOTHYROXINE SODIUM 88 MCG
88 TABLET ORAL DAILY
Qty: 30 TABLET | Refills: 3 | Status: SHIPPED | OUTPATIENT
Start: 2017-11-28 | End: 2018-05-25 | Stop reason: SDUPTHER

## 2017-11-28 RX ORDER — ROSUVASTATIN CALCIUM 40 MG/1
40 TABLET, COATED ORAL DAILY
Qty: 30 TABLET | Refills: 6 | Status: SHIPPED | OUTPATIENT
Start: 2017-11-28 | End: 2018-06-08 | Stop reason: SINTOL

## 2017-11-28 NOTE — PROGRESS NOTES
Subjective   Nguyễn Santana is a 57 y.o. male seen for follow up for hypothyroidism, HTN, lab review. Patient states each time he took his Synthroid he was feeling hot and having heart palpitations. He has been off medication x 4 weeks and 3 weeks at the time of his lab work.      History of Present Illness this is a 57-year-old gentleman known patient with Hashimoto thyroiditis and hypothyroidism as well as hypertension and dyslipidemia and vitamin D deficiency.  Over the course of last 6 months he has had no significant health problems for which to go to emergency room or hospital.  He is however complaining of palpitation.  This is been on bothering him for about 2 months and he has been taking his current dose of Synthroid which is 112 µg on N inconsistent and sometimes every other day basis.    /82  Resp 16  Wt 171 lb (77.6 kg)  BMI 25.25 kg/m2     Allergies   Allergen Reactions   • Levothyroxine      Patient cannot take the generic. He can only take the brand name.       Current Outpatient Prescriptions:   •  albuterol (PROAIR HFA) 108 (90 Base) MCG/ACT inhaler, Inhale 2 puffs Every 4 (Four) Hours As Needed for Wheezing., Disp: , Rfl:   •  albuterol (PROVENTIL) (2.5 MG/3ML) 0.083% nebulizer solution, , Disp: , Rfl:   •  albuterol (PROVENTIL) 2 MG tablet, Take 1 tablet by mouth 4 (Four) Times a Day., Disp: , Rfl: 5  •  budesonide-formoterol (SYMBICORT) 160-4.5 MCG/ACT inhaler, Inhale 2 puffs 2 (two) times a day., Disp: , Rfl:   •  Dextromethorphan-Guaifenesin (CORICIDIN HBP CONGESTION/COUGH)  MG capsule, Take 1 Units by mouth 2 (Two) Times a Day As Needed (congestion)., Disp: 168 each, Rfl: 6  •  montelukast (SINGULAIR) 10 MG tablet, Take 1 tablet by mouth daily., Disp: , Rfl:   •  O2 (OXYGEN), Inhale 1 (One) Time. 3 LPM HS only, Disp: , Rfl:   •  Respiratory Therapy Supplies (NEBULIZER) device, , Disp: , Rfl:   •  rosuvastatin (CRESTOR) 40 MG tablet, Take 1 tablet by mouth Daily. PA was  approved., Disp: 30 tablet, Rfl: 6  •  SPIRIVA HANDIHALER 18 MCG per inhalation capsule, , Disp: , Rfl: 5  •  SPIRIVA RESPIMAT 2.5 MCG/ACT aerosol solution, , Disp: , Rfl:   •  vitamin D (ERGOCALCIFEROL) 34247 units capsule capsule, TAKE ONE CAPSULE BY MOUTH ONCE WEEKLY, Disp: 12 capsule, Rfl: 0  •  SYNTHROID 112 MCG tablet, Take 1 tablet by mouth Daily. On empty stomach, Disp: 30 tablet, Rfl: 5      The following portions of the patient's history were reviewed and updated as appropriate: allergies, current medications, past family history, past medical history, past social history, past surgical history and problem list.    Review of Systems   Constitutional: Negative.    HENT: Negative.    Eyes: Negative.    Respiratory: Negative.    Cardiovascular: Positive for palpitations.   Gastrointestinal: Negative.    Endocrine: Positive for heat intolerance.   Genitourinary: Negative.    Musculoskeletal: Negative.    Skin: Negative.    Allergic/Immunologic: Negative.    Neurological: Negative.    Hematological: Negative.    Psychiatric/Behavioral: Negative.        Objective   Physical Exam   Constitutional: He is oriented to person, place, and time. He appears well-developed and well-nourished. No distress.   Patient is on nasal O2.   HENT:   Head: Normocephalic and atraumatic.   Right Ear: External ear normal.   Left Ear: External ear normal.   Nose: Nose normal.   Mouth/Throat: Oropharynx is clear and moist. No oropharyngeal exudate.   Eyes: Conjunctivae and EOM are normal. Pupils are equal, round, and reactive to light. Right eye exhibits no discharge. Left eye exhibits no discharge. No scleral icterus.   Neck: Normal range of motion. Neck supple. No JVD present. No tracheal deviation present. Thyromegaly present.   Somewhat a nonhomogeneous enlargement of his thyroid to about 1-1/2 to twice normal size.   Cardiovascular: Normal rate, regular rhythm, normal heart sounds and intact distal pulses.  Exam reveals no gallop  and no friction rub.    No murmur heard.  Pulmonary/Chest: Effort normal and breath sounds normal. No stridor. No respiratory distress. He has no wheezes. He has no rales. He exhibits no tenderness.   Abdominal: Soft. Bowel sounds are normal. He exhibits no distension and no mass. There is no tenderness. There is no rebound and no guarding. No hernia.   Musculoskeletal: Normal range of motion. He exhibits no tenderness or deformity.   Lymphadenopathy:     He has no cervical adenopathy.   Neurological: He is alert and oriented to person, place, and time. He has normal reflexes. He displays normal reflexes. No cranial nerve deficit. He exhibits normal muscle tone. Coordination normal.   Skin: Skin is warm and dry. No rash noted. He is not diaphoretic. No erythema. No pallor.   Psychiatric: He has a normal mood and affect. His behavior is normal. Judgment and thought content normal.   Nursing note and vitals reviewed.    Results for orders placed or performed in visit on 10/12/17   T3, Free   Result Value Ref Range    T3, Free 2.7 2.0 - 4.4 pg/mL   T4 & TSH ([facility])   Result Value Ref Range    TSH 3.940 0.270 - 4.200 mIU/mL    T4, Total 6.55 4.50 - 11.70 mcg/dL   T4, Free   Result Value Ref Range    Free T4 1.24 0.93 - 1.70 ng/dL   Thyroglobulin With Anti-TG   Result Value Ref Range    Thyroglobulin Ab 1.6 (H) 0.0 - 0.9 IU/mL   Uric Acid   Result Value Ref Range    Uric Acid 5.2 3.4 - 7.0 mg/dL   Vitamin D 25 Hydroxy   Result Value Ref Range    25 Hydroxy, Vitamin D 44.8 30.0 - 100.0 ng/mL   Comprehensive Metabolic Panel   Result Value Ref Range    Glucose 86 65 - 99 mg/dL    BUN 18 6 - 20 mg/dL    Creatinine 1.20 0.76 - 1.27 mg/dL    eGFR Non African Am 63 >60 mL/min/1.73    eGFR African Am 76 >60 mL/min/1.73    BUN/Creatinine Ratio 15.0 7.0 - 25.0    Sodium 141 136 - 145 mmol/L    Potassium 4.8 3.5 - 5.2 mmol/L    Chloride 100 98 - 107 mmol/L    Total CO2 26.0 22.0 - 29.0 mmol/L    Calcium 9.4 8.6 - 10.5 mg/dL     Total Protein 6.9 6.0 - 8.5 g/dL    Albumin 4.50 3.50 - 5.20 g/dL    Globulin 2.4 gm/dL    A/G Ratio 1.9 g/dL    Total Bilirubin 0.7 0.1 - 1.2 mg/dL    Alkaline Phosphatase 81 39 - 117 U/L    AST (SGOT) 18 1 - 40 U/L    ALT (SGPT) 11 1 - 41 U/L   Lipid Panel   Result Value Ref Range    Total Cholesterol 225 (H) 0 - 200 mg/dL    Triglycerides 106 0 - 150 mg/dL    HDL Cholesterol 58 40 - 60 mg/dL    VLDL Cholesterol 21.2 5 - 40 mg/dL    LDL Cholesterol  146 (H) 0 - 100 mg/dL   Thyroglobulin By ELIGIO   Result Value Ref Range    Thyroglobulin (TG-ELIGIO) 2.5 ng/mL         Assessment/Plan   Diagnoses and all orders for this visit:    Hypothyroidism due to Hashimoto's thyroiditis  -     T3, Free; Future  -     T4 & TSH ([facility]); Future  -     T4, Free; Future  -     Thyroglobulin With Anti-TG; Future  -     Uric Acid; Future  -     Vitamin D 25 Hydroxy; Future  -     Comprehensive Metabolic Panel; Future  -     Lipid Panel; Future    Vitamin D deficiency  -     T3, Free; Future  -     T4 & TSH ([facility]); Future  -     T4, Free; Future  -     Thyroglobulin With Anti-TG; Future  -     Uric Acid; Future  -     Vitamin D 25 Hydroxy; Future  -     Comprehensive Metabolic Panel; Future  -     Lipid Panel; Future    Chronic fatigue  -     T3, Free; Future  -     T4 & TSH ([facility]); Future  -     T4, Free; Future  -     Thyroglobulin With Anti-TG; Future  -     Uric Acid; Future  -     Vitamin D 25 Hydroxy; Future  -     Comprehensive Metabolic Panel; Future  -     Lipid Panel; Future    Dyslipidemia  -     T3, Free; Future  -     T4 & TSH ([facility]); Future  -     T4, Free; Future  -     Thyroglobulin With Anti-TG; Future  -     Uric Acid; Future  -     Vitamin D 25 Hydroxy; Future  -     Comprehensive Metabolic Panel; Future  -     Lipid Panel; Future    Other orders  -     SYNTHROID 88 MCG tablet; Take 1 tablet by mouth Daily. On empty stomach  -     ezetimibe (ZETIA) 10 MG tablet; Take 1 tablet by mouth Daily.  -     vitamin D  (ERGOCALCIFEROL) 40418 units capsule capsule; Take 1 capsule by mouth Every 7 (Seven) Days.  -     rosuvastatin (CRESTOR) 40 MG tablet; Take 1 tablet by mouth Daily. PA was approved.               In summary I saw and examined this 57-year-old gentleman for above-mentioned problems.  I reviewed his laboratory evaluation of 11/14/2017 and provided him a hard copy of it.  Overall he is clinically and metabolically stable however his a cholesterol and LDL remain elevated.  Therefore we will continue his current medications and will add Zetia 10 mg daily and reduce Synthroid to 88 µg daily.  He will continue rest of his prescriptions and I will see him in 6 months or sooner if needed with laboratory evaluation prior to office visit.

## 2017-12-04 RX ORDER — ERGOCALCIFEROL 1.25 MG/1
50000 CAPSULE ORAL
Qty: 13 CAPSULE | Refills: 3 | Status: SHIPPED | OUTPATIENT
Start: 2017-12-04 | End: 2017-12-07 | Stop reason: SDUPTHER

## 2017-12-05 ENCOUNTER — TELEPHONE (OUTPATIENT)
Dept: FAMILY MEDICINE CLINIC | Facility: CLINIC | Age: 57
End: 2017-12-05

## 2017-12-05 ENCOUNTER — OFFICE VISIT (OUTPATIENT)
Dept: FAMILY MEDICINE CLINIC | Facility: CLINIC | Age: 57
End: 2017-12-05

## 2017-12-05 VITALS
TEMPERATURE: 97.5 F | OXYGEN SATURATION: 93 % | HEIGHT: 69 IN | BODY MASS INDEX: 25.18 KG/M2 | WEIGHT: 170 LBS | HEART RATE: 66 BPM | SYSTOLIC BLOOD PRESSURE: 112 MMHG | DIASTOLIC BLOOD PRESSURE: 68 MMHG

## 2017-12-05 DIAGNOSIS — Z00.00 HEALTHCARE MAINTENANCE: ICD-10-CM

## 2017-12-05 DIAGNOSIS — J44.9 CHRONIC OBSTRUCTIVE PULMONARY DISEASE, UNSPECIFIED COPD TYPE (HCC): Primary | ICD-10-CM

## 2017-12-05 DIAGNOSIS — Z86.010 HISTORY OF COLON POLYPS: ICD-10-CM

## 2017-12-05 DIAGNOSIS — Z87.891 FORMER SMOKER: ICD-10-CM

## 2017-12-05 PROCEDURE — 99214 OFFICE O/P EST MOD 30 MIN: CPT | Performed by: NURSE PRACTITIONER

## 2017-12-05 RX ORDER — ALBUTEROL SULFATE 90 UG/1
2 AEROSOL, METERED RESPIRATORY (INHALATION) EVERY 4 HOURS PRN
Qty: 18 G | Refills: 6 | Status: SHIPPED | OUTPATIENT
Start: 2017-12-05 | End: 2018-06-05 | Stop reason: SDUPTHER

## 2017-12-05 NOTE — TELEPHONE ENCOUNTER
----- Message from SHANT Garcia sent at 2017  4:17 PM EST -----  You are just quite simply the best :)  ----- Message -----     From: Melissa Jeffries MA     Sent: 2017   1:58 PM       To: SHANT Garcia    This is in your folder. :)  ----- Message -----     From: SHANT Garcia     Sent: 2017   1:28 PM       To: Melissa Jeffries MA     Please go and all scripts and find when he had his last colonoscopy I believe it was 5 years ago and with whom. We need to reschedule for a follow-up because he did have polyps. Mother recently  from colon cancer.

## 2017-12-05 NOTE — PROGRESS NOTES
Subjective   Nguyễn Santana is a 57 y.o. male. Patient is being seen today for medication check/refill for COPD. He is not fasting. His Pulmnologist wants him to finish up his Symbicort and Spiriva and then to begin on Breo and Incruse inhalers.     History of Present Illness 57-year-old  male presents to the office today to follow-up on his long-term medications for COPD. Currently using pro-air rescue inhaler, albuterol 0.083% nebulizer solution, Symbicort, and the prelim let, Singulair, oxygen dependent 20 473 visit inhaler and Spiriva aerosol solution, increase. Pulmonologist wants him to finish up his Spiriva and Symbicort then to begin the Briel and increase inhalers. States medicines are working well. Vital signs today 112/68 for blood pressure 66 heart rate respiratory rate 16, O2 saturation 93% on room air. Patient's body weight 77.1 keys or 170 pounds. BMI 25.1. All of patient's labs are current through another provider. Pulmonary ordering all respiratory meds except Proair.    The following portions of the patient's history were reviewed and updated as appropriate: allergies, current medications, past family history, past medical history, past social history, past surgical history and problem list.    Review of Systems   Respiratory:        COPD.    All other systems reviewed and are negative.      Objective   Physical Exam   Constitutional: He is oriented to person, place, and time. He appears well-developed and well-nourished.   HENT:   Head: Normocephalic and atraumatic.   Eyes: EOM are normal. Pupils are equal, round, and reactive to light.   Neck: Normal range of motion. Neck supple.   Cardiovascular: Normal rate and regular rhythm.    Pulmonary/Chest: Effort normal.    Able to hear from upper lobes To lower lobes with fine popping sounds bilateral lower lobes   Abdominal: Soft. Bowel sounds are normal.   Musculoskeletal: Normal range of motion.   Neurological: He is alert and oriented  to person, place, and time.   Skin: Skin is warm and dry.   Psychiatric: He has a normal mood and affect. His behavior is normal. Judgment and thought content normal.   Nursing note and vitals reviewed.      Assessment/Plan   Nguyễn was seen today for med refill and labs only.    Diagnoses and all orders for this visit:    Chronic obstructive pulmonary disease, unspecified COPD type  -     albuterol (PROAIR HFA) 108 (90 Base) MCG/ACT inhaler; Inhale 2 puffs Every 4 (Four) Hours As Needed for Wheezing  -     CT chest low dose wo; Future    Former smoker  -     CT chest low dose wo; Future    Healthcare maintenance  -     Hepatitis C Antibody    History of colon polyps  -     Ambulatory Referral For Screening Colonoscopy     Pro Air filled  With 6 refills ordered. Patient's pulmonologist and endocrinologist have taken over reordering all of his medicines with exception of the Pro Air. Patient doing very well today color is good reading well. Will follow-up in 6 months and as needed. Patient aware of need to go to third-party pharmacy to get T dap per Medicare regulation. Hepatitis screening done today. Requests place for lung cancer screening patient meets criteria. Is a 40 year pack smoker who stopped smoking 3 years ago. He is asymptomatic other than his COPD. Patient aware of the availability of Medicare annual wellness visit we will get back to me if he decides to do it.Most of visit spent educating pt on Healthcare maintenance, 25 min plus

## 2017-12-06 LAB — HCV AB S/CO SERPL IA: <0.1 S/CO RATIO (ref 0–0.9)

## 2017-12-07 RX ORDER — ERGOCALCIFEROL 1.25 MG/1
50000 CAPSULE ORAL
Qty: 13 CAPSULE | Refills: 3 | Status: SHIPPED | OUTPATIENT
Start: 2017-12-07 | End: 2018-06-05 | Stop reason: SDUPTHER

## 2018-01-10 ENCOUNTER — APPOINTMENT (OUTPATIENT)
Dept: OTHER | Facility: HOSPITAL | Age: 58
End: 2018-01-10

## 2018-02-14 ENCOUNTER — CLINICAL SUPPORT (OUTPATIENT)
Dept: OTHER | Facility: HOSPITAL | Age: 58
End: 2018-02-14

## 2018-02-14 ENCOUNTER — HOSPITAL ENCOUNTER (OUTPATIENT)
Dept: PET IMAGING | Facility: HOSPITAL | Age: 58
Discharge: HOME | End: 2018-02-14
Admitting: CLINICAL NURSE SPECIALIST

## 2018-02-14 DIAGNOSIS — Z87.891 HISTORY OF SMOKING 30 OR MORE PACK YEARS: ICD-10-CM

## 2018-02-14 DIAGNOSIS — Z12.2 ENCOUNTER FOR SCREENING FOR LUNG CANCER: ICD-10-CM

## 2018-02-14 DIAGNOSIS — Z12.2 ENCOUNTER FOR SCREENING FOR LUNG CANCER: Primary | ICD-10-CM

## 2018-02-14 PROCEDURE — G0297 LDCT FOR LUNG CA SCREEN: HCPCS

## 2018-02-14 PROCEDURE — G0296 VISIT TO DETERM LDCT ELIG: HCPCS | Performed by: CLINICAL NURSE SPECIALIST

## 2018-02-28 ENCOUNTER — TELEPHONE (OUTPATIENT)
Dept: FAMILY MEDICINE CLINIC | Facility: CLINIC | Age: 58
End: 2018-02-28

## 2018-02-28 NOTE — TELEPHONE ENCOUNTER
Patients wife called wanting results from the recent chest CT this patient had.    Please advise, thanks!

## 2018-04-03 DIAGNOSIS — R91.1 NODULE OF RIGHT LUNG: Primary | ICD-10-CM

## 2018-04-09 NOTE — PROGRESS NOTES
[facility] Low-Dose Lung Cancer CT Screening Visit    Nguyễn Santana is a pleasant 57 y.o. male seen today at the request of SHANT Garcia in our Lung Cancer Screening Program, being seen for Lung Cancer Screening Counseling and a Shared Decision Making Visit prior to Low-Dose Lung Cancer Screening CT exam.    SMOKING HISTORY:   Began smoking at age 12.  Smoked 1.5 ppd for 6 years and 1 ppd for 36 years for a 45 pack year history.  She says that she quit 3 years ago even though she reports still smoking 1 or 2 cigarettes per week.    Nguyễn Santana is a former smoker quitting 3 years ago with a 45 pack year history.  Reports no use of alternate forms of tobacco, electronic cigarettes, marijuana or other substances.  Based on the recommendation of the [place] Preventive Services Task Force, this patient is at high risk for lung cancer and a low-dose CT screening scan is recommended.     We discussed the connection between Radon and Lung Cancer and the availability of free test kits.  Some second-hand smoke exposure as a child with both parents smoking in their home.  His wife and 2 children smoke in the garage so he does have some current second-hand exposure as well.  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    The patient has had no hemoptysis, unintentional weight loss or increasing shortness of breath. The patient is asymptomatic and has no signs or symptoms of lung cancer.   The patient reports no personal history of cancer.  Additionally, reports no family history of lung cancer.   He has COPD and uses 3L/min of oxygen at night.    Together we discussed the potential benefits and potential harms of being screened for lung cancer including the potential for follow-up diagnostic testing, risk for over diagnosis, false positive rate and total radiation exposure using the [place] [employer] standardized  decision aid.  We reviewed the patient's smoking history and counseled on the importance and health benefits of maintaining cigarette smoking abstinence.      Smoking Abstinence  DISCUSSION HELD TODAY:     We discussed that there are a number of resources and tobacco cessation interventions to assist with smoking cessation including the 1-800-Quit Now line, Health Department programs, [place] [facility] resources, and use of the U[place] Department of Health and Human Services five keys for quitting and quit plan worksheet.     Recommendations for continued lung cancer screening:      We discussed the NCCN guidelines for lung cancer screening and the patient verbalized understanding that annual screening is recommended until fifteen years beyond smoking as long as they have no other disease or comorbidity that would prevent them from receiving cancer treatments such as surgery should a lung cancer be detected. The [facility] Lung Cancer Screening Shared Decision-Making Tool was utilized as an aid in discussing whether or not screening was right. The patient has decided to proceed with a Low Dose Lung Cancer Screening CT today. The patient is aware that the results of his screening will be shared with the referring provider or PCP as well.       The patient verbalizes understanding that results of this low dose lung CT exam will be called and that assistance will be provided in arranging any necessary follow-up.    After review of the NCCN guidelines and recommendations for ongoing screening, the patient verbalized understanding of recommendations for follow-up. We discussed the importance of adherence to continued annual screening until 15 years beyond smoking or until other life-limiting comorbidities are present. We discussed the impact of comorbidities and ability and willing to undergo diagnosis and treatment.    The patient was counseled on the continued health benefits of having quit tobacco, maintaining  smoking abstinence, smoking cessation strategies and resources, as well as the importance of adherence to annual lung cancer low-dose CT screening.    Nellie Hsu, MSN, APRN, ACNS-BC, AOCN, CHPN  Clinical Nurse Specialist  [facility]

## 2018-04-14 VITALS
BODY MASS INDEX: 26.52 KG/M2 | RESPIRATION RATE: 18 BRPM | WEIGHT: 175 LBS | HEART RATE: 70 BPM | TEMPERATURE: 97.1 F | DIASTOLIC BLOOD PRESSURE: 83 MMHG | SYSTOLIC BLOOD PRESSURE: 132 MMHG | HEIGHT: 68 IN

## 2018-05-17 ENCOUNTER — RESULTS ENCOUNTER (OUTPATIENT)
Dept: ENDOCRINOLOGY | Age: 58
End: 2018-05-17

## 2018-05-17 DIAGNOSIS — E78.5 DYSLIPIDEMIA: ICD-10-CM

## 2018-05-17 DIAGNOSIS — E55.9 VITAMIN D DEFICIENCY: ICD-10-CM

## 2018-05-17 DIAGNOSIS — E06.3 HYPOTHYROIDISM DUE TO HASHIMOTO'S THYROIDITIS: ICD-10-CM

## 2018-05-17 DIAGNOSIS — R53.82 CHRONIC FATIGUE: ICD-10-CM

## 2018-05-17 DIAGNOSIS — E03.8 HYPOTHYROIDISM DUE TO HASHIMOTO'S THYROIDITIS: ICD-10-CM

## 2018-05-25 RX ORDER — LEVOTHYROXINE SODIUM 88 MCG
TABLET ORAL
Qty: 30 TABLET | Refills: 0 | Status: SHIPPED | OUTPATIENT
Start: 2018-05-25 | End: 2018-06-08

## 2018-06-03 LAB
25(OH)D3+25(OH)D2 SERPL-MCNC: 48.1 NG/ML (ref 30–100)
ALBUMIN SERPL-MCNC: 4.5 G/DL (ref 3.5–5.2)
ALBUMIN/GLOB SERPL: 2.5 G/DL
ALP SERPL-CCNC: 55 U/L (ref 39–117)
ALT SERPL-CCNC: 15 U/L (ref 1–41)
AST SERPL-CCNC: 15 U/L (ref 1–40)
BILIRUB SERPL-MCNC: 1.1 MG/DL (ref 0.1–1.2)
BUN SERPL-MCNC: 17 MG/DL (ref 6–20)
BUN/CREAT SERPL: 13.8 (ref 7–25)
CALCIUM SERPL-MCNC: 9.5 MG/DL (ref 8.6–10.5)
CHLORIDE SERPL-SCNC: 103 MMOL/L (ref 98–107)
CHOLEST SERPL-MCNC: 231 MG/DL (ref 0–200)
CO2 SERPL-SCNC: 27.1 MMOL/L (ref 22–29)
CREAT SERPL-MCNC: 1.23 MG/DL (ref 0.76–1.27)
GFR SERPLBLD CREATININE-BSD FMLA CKD-EPI: 61 ML/MIN/1.73
GFR SERPLBLD CREATININE-BSD FMLA CKD-EPI: 74 ML/MIN/1.73
GLOBULIN SER CALC-MCNC: 1.8 GM/DL
GLUCOSE SERPL-MCNC: 93 MG/DL (ref 65–99)
HDLC SERPL-MCNC: 54 MG/DL (ref 40–60)
INTERPRETATION: NORMAL
LDLC SERPL CALC-MCNC: 139 MG/DL (ref 0–100)
POTASSIUM SERPL-SCNC: 5.4 MMOL/L (ref 3.5–5.2)
PROT SERPL-MCNC: 6.3 G/DL (ref 6–8.5)
SODIUM SERPL-SCNC: 142 MMOL/L (ref 136–145)
T3FREE SERPL-MCNC: 2.5 PG/ML (ref 2–4.4)
T4 FREE SERPL-MCNC: 1.71 NG/DL (ref 0.93–1.7)
T4 SERPL-MCNC: 8.1 MCG/DL (ref 4.5–11.7)
THYROGLOB AB SERPL-ACNC: 1.2 IU/ML (ref 0–0.9)
THYROGLOB SERPL-MCNC: <2 NG/ML
TRIGL SERPL-MCNC: 191 MG/DL (ref 0–150)
TSH SERPL DL<=0.005 MIU/L-ACNC: 2.37 MIU/ML (ref 0.27–4.2)
URATE SERPL-MCNC: 4.7 MG/DL (ref 3.4–7)
VLDLC SERPL CALC-MCNC: 38.2 MG/DL (ref 5–40)

## 2018-06-05 ENCOUNTER — OFFICE VISIT (OUTPATIENT)
Dept: FAMILY MEDICINE CLINIC | Facility: CLINIC | Age: 58
End: 2018-06-05

## 2018-06-05 VITALS
RESPIRATION RATE: 16 BRPM | WEIGHT: 166.2 LBS | OXYGEN SATURATION: 93 % | SYSTOLIC BLOOD PRESSURE: 124 MMHG | TEMPERATURE: 98.8 F | HEIGHT: 68 IN | BODY MASS INDEX: 25.19 KG/M2 | HEART RATE: 72 BPM | DIASTOLIC BLOOD PRESSURE: 68 MMHG

## 2018-06-05 DIAGNOSIS — E55.9 VITAMIN D DEFICIENCY: ICD-10-CM

## 2018-06-05 DIAGNOSIS — J44.9 CHRONIC OBSTRUCTIVE PULMONARY DISEASE, UNSPECIFIED COPD TYPE (HCC): Primary | ICD-10-CM

## 2018-06-05 PROCEDURE — 99213 OFFICE O/P EST LOW 20 MIN: CPT | Performed by: NURSE PRACTITIONER

## 2018-06-05 RX ORDER — ERGOCALCIFEROL 1.25 MG/1
50000 CAPSULE ORAL
Qty: 12 CAPSULE | Refills: 3 | Status: SHIPPED | OUTPATIENT
Start: 2018-06-05 | End: 2018-06-08 | Stop reason: SDUPTHER

## 2018-06-05 RX ORDER — ALBUTEROL SULFATE 90 UG/1
2 AEROSOL, METERED RESPIRATORY (INHALATION) EVERY 4 HOURS PRN
Qty: 18 G | Refills: 6 | Status: SHIPPED | OUTPATIENT
Start: 2018-06-05 | End: 2018-10-26 | Stop reason: SDUPTHER

## 2018-06-05 RX ORDER — ALBUTEROL SULFATE 2.5 MG/3ML
2.5 SOLUTION RESPIRATORY (INHALATION)
Qty: 125 VIAL | Refills: 6 | Status: SHIPPED | OUTPATIENT
Start: 2018-06-05 | End: 2018-10-26 | Stop reason: SDUPTHER

## 2018-06-05 RX ORDER — MONTELUKAST SODIUM 10 MG/1
10 TABLET ORAL DAILY
Qty: 30 TABLET | Refills: 6 | Status: SHIPPED | OUTPATIENT
Start: 2018-06-05 | End: 2018-10-26 | Stop reason: SDUPTHER

## 2018-06-05 NOTE — PROGRESS NOTES
Subjective   Nguyễn Santana is a 57 y.o. male. Patient is being seen today for medication check/refill for COPD. He is not fasting.     History of Present Illness57-year-old  male presents to the office today to follow-up on long-term usage of medications for his COPD which is albuterol pro-air, albuterol solution for nebulizer, albuterol tablets 2 mg, 30 Elipta 200/25 µg per inhalation aerosol powder, Singulair 10 mg tablet per day, nebulizer supplies, Incruse Ellipta, vitamin D 50,000 international units per week. Patient is current with all labs does not need any lab work done.    The following portions of the patient's history were reviewed and updated as appropriate: allergies, current medications, past family history, past medical history, past social history, past surgical history and problem list.    Review of Systems   Respiratory:        COPD.    All other systems reviewed and are negative.      Objective   Physical Exam   Constitutional: He is oriented to person, place, and time. He appears well-developed and well-nourished.   HENT:   Head: Normocephalic and atraumatic.   Eyes: EOM are normal. Pupils are equal, round, and reactive to light.   Neck: Normal range of motion. Neck supple.   Cardiovascular: Normal rate and regular rhythm.    Abdominal: Soft. Bowel sounds are normal.   Musculoskeletal: Normal range of motion.   Neurological: He is alert and oriented to person, place, and time.   Skin: Skin is warm and dry. Capillary refill takes 2 to 3 seconds.   Psychiatric: He has a normal mood and affect. His behavior is normal. Judgment and thought content normal.   Nursing note and vitals reviewed.        Assessment/Plan   Nguyễn was seen today for med refill and labs only.    Diagnoses and all orders for this visit:    Chronic obstructive pulmonary disease, unspecified COPD type  -     albuterol (PROAIR HFA) 108 (90 Base) MCG/ACT inhaler; Inhale 2 puffs Every 4 (Four) Hours As Needed for  Wheezing.    Vitamin D deficiency  -     vitamin D (ERGOCALCIFEROL) 14488 units capsule capsule; Take 1 capsule by mouth Every 7 (Seven) Days.    Other orders  -     Umeclidinium Bromide (INCRUSE ELLIPTA) 62.5 MCG/INH aerosol powder ; Inhale 1 puff Daily.  -     montelukast (SINGULAIR) 10 MG tablet; Take 1 tablet by mouth Daily.  -     Fluticasone Furoate-Vilanterol (BREO ELLIPTA) 200-25 MCG/INH aerosol powder ; Inhale 1 puff Daily.  -     albuterol (PROVENTIL) 2 MG tablet; Take 1 tablet by mouth 4 (Four) Times a Day.  -     albuterol (PROVENTIL) (2.5 MG/3ML) 0.083% nebulizer solution; Take 2.5 mg by nebulization 4 (Four) Times a Day.    Medications renewed as needed. Patient will continue eating a low-fat high-fiber diet full of fruits and vegetables drinking six-day glasses of water a day. Will use his oxygen whenever he walks. We will notify office immediately of any decline in health status. Keeping all appointments with providers as requested.

## 2018-06-08 ENCOUNTER — OFFICE VISIT (OUTPATIENT)
Dept: ENDOCRINOLOGY | Age: 58
End: 2018-06-08

## 2018-06-08 VITALS
BODY MASS INDEX: 25.22 KG/M2 | SYSTOLIC BLOOD PRESSURE: 130 MMHG | RESPIRATION RATE: 16 BRPM | DIASTOLIC BLOOD PRESSURE: 70 MMHG | WEIGHT: 167.2 LBS

## 2018-06-08 DIAGNOSIS — E03.8 HYPOTHYROIDISM DUE TO HASHIMOTO'S THYROIDITIS: Primary | ICD-10-CM

## 2018-06-08 DIAGNOSIS — E55.9 VITAMIN D DEFICIENCY: ICD-10-CM

## 2018-06-08 DIAGNOSIS — E78.5 DYSLIPIDEMIA: ICD-10-CM

## 2018-06-08 DIAGNOSIS — E06.3 HYPOTHYROIDISM DUE TO HASHIMOTO'S THYROIDITIS: Primary | ICD-10-CM

## 2018-06-08 DIAGNOSIS — R53.82 CHRONIC FATIGUE: ICD-10-CM

## 2018-06-08 PROCEDURE — 99214 OFFICE O/P EST MOD 30 MIN: CPT | Performed by: INTERNAL MEDICINE

## 2018-06-08 RX ORDER — ERGOCALCIFEROL 1.25 MG/1
50000 CAPSULE ORAL
Qty: 13 CAPSULE | Refills: 3 | Status: SHIPPED | OUTPATIENT
Start: 2018-06-08 | End: 2018-10-26 | Stop reason: SDUPTHER

## 2018-06-08 RX ORDER — LEVOTHYROXINE SODIUM 100 MCG
100 TABLET ORAL DAILY
Qty: 30 TABLET | Refills: 5 | Status: SHIPPED | OUTPATIENT
Start: 2018-06-08 | End: 2018-10-26 | Stop reason: SDUPTHER

## 2018-06-08 NOTE — PROGRESS NOTES
Subjective   Nguyễn Santana is a 57 y.o. male seen for follow up for hypothyroidism, HTN, lab review. Patient states that for one hour after taking his thyroid medication he is feeling hot and his thyroid medication is not lasting all day. He states that his Crestor was causing cramps in his calves and forearms and now Zetia is causing pain in the same places. He is having fatigue due to his COPD.   History of Present Illness this is a 57-year-old gentleman known patient with hypothyroidism and hypertension and dyslipidemia as well as vitamin D deficiency.  Over the course of last 6 months he has had no significant health problems for which to go to the emergency room or hospital.  However he is feeling miserable taking Crestor or Zetia because it gives him a lot of muscle cramp and pain.  His wife is with him and concurs with him.    /70   Resp 16   Wt 75.8 kg (167 lb 3.2 oz)   BMI 25.22 kg/m²      Allergies   Allergen Reactions   • Levothyroxine      Patient cannot take the generic. He can only take the brand name.       Current Outpatient Prescriptions:   •  albuterol (PROAIR HFA) 108 (90 Base) MCG/ACT inhaler, Inhale 2 puffs Every 4 (Four) Hours As Needed for Wheezing., Disp: 18 g, Rfl: 6  •  albuterol (PROVENTIL) (2.5 MG/3ML) 0.083% nebulizer solution, Take 2.5 mg by nebulization 4 (Four) Times a Day., Disp: 125 vial, Rfl: 6  •  albuterol (PROVENTIL) 2 MG tablet, Take 1 tablet by mouth 4 (Four) Times a Day., Disp: 120 tablet, Rfl: 6  •  ezetimibe (ZETIA) 10 MG tablet, Take 1 tablet by mouth Daily., Disp: 30 tablet, Rfl: 11  •  Fluticasone Furoate-Vilanterol (BREO ELLIPTA) 200-25 MCG/INH aerosol powder , Inhale 1 puff Daily., Disp: 28 each, Rfl: 6  •  montelukast (SINGULAIR) 10 MG tablet, Take 1 tablet by mouth Daily., Disp: 30 tablet, Rfl: 6  •  O2 (OXYGEN), Inhale 1 (One) Time. 3 LPM HS only, Disp: , Rfl:   •  Respiratory Therapy Supplies (NEBULIZER) device, , Disp: , Rfl:   •  rosuvastatin  (CRESTOR) 40 MG tablet, Take 1 tablet by mouth Daily. PA was approved., Disp: 30 tablet, Rfl: 6  •  SYNTHROID 88 MCG tablet, TAKE ONE TABLET BY MOUTH DAILY ON AN EMPTY STOMACH, Disp: 30 tablet, Rfl: 0  •  Umeclidinium Bromide (INCRUSE ELLIPTA) 62.5 MCG/INH aerosol powder , Inhale 1 puff Daily., Disp: 30 each, Rfl: 6  •  vitamin D (ERGOCALCIFEROL) 32206 units capsule capsule, Take 1 capsule by mouth Every 7 (Seven) Days., Disp: 12 capsule, Rfl: 3      The following portions of the patient's history were reviewed and updated as appropriate: allergies, current medications, past family history, past medical history, past social history, past surgical history and problem list.    Review of Systems   Constitutional: Positive for fatigue.   HENT: Negative.    Eyes: Negative.    Respiratory: Negative.    Cardiovascular: Negative.    Gastrointestinal: Negative.    Endocrine: Positive for heat intolerance.   Genitourinary: Negative.    Musculoskeletal: Negative.    Skin: Negative.    Allergic/Immunologic: Negative.    Neurological: Negative.    Hematological: Negative.    Psychiatric/Behavioral: Negative.        Objective   Physical Exam   Constitutional: He is oriented to person, place, and time. He appears well-developed and well-nourished. No distress.   Patient is on nasal O2.   HENT:   Head: Normocephalic and atraumatic.   Right Ear: External ear normal.   Left Ear: External ear normal.   Nose: Nose normal.   Mouth/Throat: Oropharynx is clear and moist. No oropharyngeal exudate.   Eyes: Conjunctivae and EOM are normal. Pupils are equal, round, and reactive to light. Right eye exhibits no discharge. Left eye exhibits no discharge. No scleral icterus.   Neck: Normal range of motion. Neck supple. No JVD present. No tracheal deviation present. Thyromegaly present.   Somewhat a nonhomogeneous enlargement of his thyroid to about 1-1/2 to twice normal size.   Cardiovascular: Normal rate, regular rhythm, normal heart sounds and  intact distal pulses.  Exam reveals no gallop and no friction rub.    No murmur heard.  Pulmonary/Chest: Effort normal and breath sounds normal. No stridor. No respiratory distress. He has no wheezes. He has no rales. He exhibits no tenderness.   Abdominal: Soft. Bowel sounds are normal. He exhibits no distension and no mass. There is no tenderness. There is no rebound and no guarding. No hernia.   Musculoskeletal: Normal range of motion. He exhibits no tenderness or deformity.   Lymphadenopathy:     He has no cervical adenopathy.   Neurological: He is alert and oriented to person, place, and time. He has normal reflexes. He displays normal reflexes. No cranial nerve deficit. He exhibits normal muscle tone. Coordination normal.   Skin: Skin is warm and dry. No rash noted. He is not diaphoretic. No erythema. No pallor.   Psychiatric: He has a normal mood and affect. His behavior is normal. Judgment and thought content normal.   Nursing note and vitals reviewed.       Results for orders placed or performed in visit on 05/17/18   T3, Free   Result Value Ref Range    T3, Free 2.5 2.0 - 4.4 pg/mL   T4 & TSH ([facility])   Result Value Ref Range    TSH 2.370 0.270 - 4.200 mIU/mL    T4, Total 8.10 4.50 - 11.70 mcg/dL   T4, Free   Result Value Ref Range    Free T4 1.71 (H) 0.93 - 1.70 ng/dL   Thyroglobulin With Anti-TG   Result Value Ref Range    Thyroglobulin Ab 1.2 (H) 0.0 - 0.9 IU/mL   Uric Acid   Result Value Ref Range    Uric Acid 4.7 3.4 - 7.0 mg/dL   Vitamin D 25 Hydroxy   Result Value Ref Range    25 Hydroxy, Vitamin D 48.1 30.0 - 100.0 ng/mL   Comprehensive Metabolic Panel   Result Value Ref Range    Glucose 93 65 - 99 mg/dL    BUN 17 6 - 20 mg/dL    Creatinine 1.23 0.76 - 1.27 mg/dL    eGFR Non African Am 61 >60 mL/min/1.73    eGFR African Am 74 >60 mL/min/1.73    BUN/Creatinine Ratio 13.8 7.0 - 25.0    Sodium 142 136 - 145 mmol/L    Potassium 5.4 (H) 3.5 - 5.2 mmol/L    Chloride 103 98 - 107 mmol/L    Total CO2 27.1  22.0 - 29.0 mmol/L    Calcium 9.5 8.6 - 10.5 mg/dL    Total Protein 6.3 6.0 - 8.5 g/dL    Albumin 4.50 3.50 - 5.20 g/dL    Globulin 1.8 gm/dL    A/G Ratio 2.5 g/dL    Total Bilirubin 1.1 0.1 - 1.2 mg/dL    Alkaline Phosphatase 55 39 - 117 U/L    AST (SGOT) 15 1 - 40 U/L    ALT (SGPT) 15 1 - 41 U/L   Lipid Panel   Result Value Ref Range    Total Cholesterol 231 (H) 0 - 200 mg/dL    Triglycerides 191 (H) 0 - 150 mg/dL    HDL Cholesterol 54 40 - 60 mg/dL    VLDL Cholesterol 38.2 5 - 40 mg/dL    LDL Cholesterol  139 (H) 0 - 100 mg/dL   Cardiovascular Risk Assessment   Result Value Ref Range    Interpretation Note    Thyroglobulin By ELIGIO   Result Value Ref Range    Thyroglobulin (TG-ELIGIO) <2.0 ng/mL         Assessment/Plan   Diagnoses and all orders for this visit:    Hypothyroidism due to Hashimoto's thyroiditis  -     T3, Free; Future  -     T4 & TSH ([facility]); Future  -     T4, Free; Future  -     Thyroglobulin With Anti-TG; Future  -     Uric Acid; Future  -     Vitamin D 25 Hydroxy; Future  -     Comprehensive Metabolic Panel; Future  -     C-Peptide; Future  -     Lipid Panel; Future    Vitamin D deficiency  -     T3, Free; Future  -     T4 & TSH ([facility]); Future  -     T4, Free; Future  -     Thyroglobulin With Anti-TG; Future  -     Uric Acid; Future  -     Vitamin D 25 Hydroxy; Future  -     Comprehensive Metabolic Panel; Future  -     C-Peptide; Future  -     Lipid Panel; Future  -     vitamin D (ERGOCALCIFEROL) 01369 units capsule capsule; Take 1 capsule by mouth Every 7 (Seven) Days.    Dyslipidemia  -     T3, Free; Future  -     T4 & TSH ([facility]); Future  -     T4, Free; Future  -     Thyroglobulin With Anti-TG; Future  -     Uric Acid; Future  -     Vitamin D 25 Hydroxy; Future  -     Comprehensive Metabolic Panel; Future  -     C-Peptide; Future  -     Lipid Panel; Future    Chronic fatigue  -     T3, Free; Future  -     T4 & TSH ([facility]); Future  -     T4, Free; Future  -     Thyroglobulin With Anti-TG;  Future  -     Uric Acid; Future  -     Vitamin D 25 Hydroxy; Future  -     Comprehensive Metabolic Panel; Future  -     C-Peptide; Future  -     Lipid Panel; Future    Other orders  -     SYNTHROID 100 MCG tablet; Take 1 tablet by mouth Daily. On empty stomach  -     PRALUENT 150 MG/ML solution pen-injector; Inject 150 mg under the skin Every 30 (Thirty) Days.               In summary I saw and examined this 57-year-old gentleman for above-mentioned problems.  I reviewed his laboratory evaluations of 05/25/2018 and provided him and his wife who was present during this office visit with a hard copy of it.  His TSH allows me to increase his dose of Synthroid to 100 µg daily.  His total cholesterol and LDL as well as triglycerides are elevated.  We will go ahead and start him on  Praluent 150 mg per month.  We administered the first injection in the office.  He will see Ms. Ana Clark in 6 months or sooner if needed with laboratory evaluation.

## 2018-07-09 ENCOUNTER — OFFICE VISIT (OUTPATIENT)
Dept: FAMILY MEDICINE CLINIC | Facility: CLINIC | Age: 58
End: 2018-07-09

## 2018-07-09 VITALS
BODY MASS INDEX: 25.46 KG/M2 | DIASTOLIC BLOOD PRESSURE: 78 MMHG | HEIGHT: 68 IN | TEMPERATURE: 98.1 F | SYSTOLIC BLOOD PRESSURE: 110 MMHG | OXYGEN SATURATION: 93 % | RESPIRATION RATE: 16 BRPM | HEART RATE: 78 BPM | WEIGHT: 168 LBS

## 2018-07-09 DIAGNOSIS — Z00.00 MEDICARE ANNUAL WELLNESS VISIT, SUBSEQUENT: Primary | ICD-10-CM

## 2018-07-09 PROCEDURE — G0439 PPPS, SUBSEQ VISIT: HCPCS | Performed by: NURSE PRACTITIONER

## 2018-07-09 NOTE — PROGRESS NOTES
QUICK REFERENCE INFORMATION:  The ABCs of the Annual Wellness Visit    Initial Medicare Wellness Visit    HEALTH RISK ASSESSMENT    1960    Recent Hospitalizations:  No hospitalization(s) within the last year.        Current Medical Providers:  Patient Care Team:  SHANT Garcia as PCP - General  SHANT Mccarthy as Nurse Practitioner (Oncology)  Lion West MD as Consulting Physician (Endocrinology)  Rigoberto Colon MD as Consulting Physician (Pulmonary Disease)        Smoking Status:  History   Smoking Status   • Former Smoker   • Packs/day: 1.00   • Types: Cigarettes   • Start date: 2/5/1974   • Quit date: 6/5/2015   Smokeless Tobacco   • Never Used     Comment: Began smoking at age 12.  Smoked 1.5 ppd for 6 years and 1 ppd for 36 years for a 45 pack year history.  She says that she quit 3 years ago even though she reports still smoking 1 or 2 cigarettes per week.       Alcohol Consumption:  History   Alcohol Use   • 1.2 oz/week   • 2 Cans of beer per week     Comment: Drinks 1 or 2 beers  or whiskey weekly.       Depression Screen:   PHQ-2/PHQ-9 Depression Screening 7/9/2018   Little interest or pleasure in doing things 3   Feeling down, depressed, or hopeless 0   Trouble falling or staying asleep, or sleeping too much 3   Feeling tired or having little energy 3   Poor appetite or overeating 3   Feeling bad about yourself - or that you are a failure or have let yourself or your family down 0   Trouble concentrating on things, such as reading the newspaper or watching television 0   Moving or speaking so slowly that other people could have noticed. Or the opposite - being so fidgety or restless that you have been moving around a lot more than usual 0   Thoughts that you would be better off dead, or of hurting yourself in some way 0   Total Score 12   If you checked off any problems, how difficult have these problems made it for you to do your work, take care of things  at home, or get along with other people? Not difficult at all       Health Habits and Functional and Cognitive Screening:  Functional & Cognitive Status 7/9/2018   Do you have difficulty preparing food and eating? No   Do you have difficulty bathing yourself, getting dressed or grooming yourself? No   Do you have difficulty using the toilet? No   Do you have difficulty moving around from place to place? No   In the past year have you fallen or experienced a near fall? No   Current Diet Well Balanced Diet   Dental Exam Up to date   Eye Exam Up to date   Exercise (times per week) 0 times per week   Current Exercise Activities Include None   Do you need help using the phone?  No   Are you deaf or do you have serious difficulty hearing?  No   Do you need help with transportation? No   Do you need help shopping? No   Do you need help preparing meals?  No   Do you need help with housework?  Yes   Do you need help with laundry? Yes   Do you need help taking your medications? No   Do you need help managing money? No   Do you ever drive or ride in a car without wearing a seat belt? No   Have you felt unusual stress, anger or loneliness in the last month? No   Who do you live with? Spouse   If you need help, do you have trouble finding someone available to you? No   Have you been bothered in the last four weeks by sexual problems? No   Do you have difficulty concentrating, remembering or making decisions? No           Does the patient have evidence of cognitive impairment? No    Asiprin use counseling: Pt longer wishes to take ASA      Recent Lab Results:    Visual Acuity:  No exam data present    Age-appropriate Screening Schedule:  Refer to the list below for future screening recommendations based on patient's age, sex and/or medical conditions. Orders for these recommended tests are listed in the plan section. The patient has been provided with a written plan.    Health Maintenance   Topic Date Due   • TDAP/TD VACCINES (1  - Tdap) 09/29/1981   • ZOSTER VACCINE (1 of 2) 09/29/2012   • INFLUENZA VACCINE  08/01/2018   • COLONOSCOPY  05/26/2022        Subjective   History of Present Illness    Nguyễn Santana is a 57 y.o. male who presents for an Annual Wellness Visit.    The following portions of the patient's history were reviewed and updated as appropriate: allergies, current medications, past family history, past medical history, past social history, past surgical history and problem list.    Outpatient Medications Prior to Visit   Medication Sig Dispense Refill   • albuterol (PROAIR HFA) 108 (90 Base) MCG/ACT inhaler Inhale 2 puffs Every 4 (Four) Hours As Needed for Wheezing. 18 g 6   • albuterol (PROVENTIL) (2.5 MG/3ML) 0.083% nebulizer solution Take 2.5 mg by nebulization 4 (Four) Times a Day. 125 vial 6   • albuterol (PROVENTIL) 2 MG tablet Take 1 tablet by mouth 4 (Four) Times a Day. 120 tablet 6   • Fluticasone Furoate-Vilanterol (BREO ELLIPTA) 200-25 MCG/INH aerosol powder  Inhale 1 puff Daily. 28 each 6   • montelukast (SINGULAIR) 10 MG tablet Take 1 tablet by mouth Daily. 30 tablet 6   • O2 (OXYGEN) Inhale 1 (One) Time. 3 LPM HS only     • PRALUENT 150 MG/ML solution pen-injector Inject 150 mg under the skin Every 30 (Thirty) Days. 3 pen 3   • Respiratory Therapy Supplies (NEBULIZER) device      • SYNTHROID 100 MCG tablet Take 1 tablet by mouth Daily. On empty stomach 30 tablet 5   • Umeclidinium Bromide (INCRUSE ELLIPTA) 62.5 MCG/INH aerosol powder  Inhale 1 puff Daily. 30 each 6   • vitamin D (ERGOCALCIFEROL) 52159 units capsule capsule Take 1 capsule by mouth Every 7 (Seven) Days. 13 capsule 3     No facility-administered medications prior to visit.        Patient Active Problem List   Diagnosis   • Atopic rhinitis   • Mixed anxiety depressive disorder   • Chronic obstructive pulmonary disease (CMS/HCC)   • Cough   • Depression   • Dizziness   • Dyslipidemia   • Hypothyroidism   • Hypoxia   • Shortness of breath   •  "Sleep apnea   • Vitamin D deficiency   • Chronic fatigue   • Oxygen dependent   • Panic attack   • Muscle spasms of neck       Advance Care Planning:  has NO advance directive - information provided to the patient today    Identification of Risk Factors:  Risk factors include: lack of transportation and polypharmacy.    Review of Systems   All other systems reviewed and are negative.      Compared to one year ago, the patient feels his physical health is the same.  Compared to one year ago, the patient feels his mental health is the same.    Objective     Physical Exam    Vitals:    07/09/18 1249   BP: 110/78   BP Location: Right arm   Patient Position: Sitting   Cuff Size: Adult   Pulse: 78   Resp: 16   Temp: 98.1 °F (36.7 °C)   TempSrc: Oral   SpO2: 93%   Weight: 76.2 kg (168 lb)   Height: 173.4 cm (68.27\")   PainSc: 0-No pain       Patient's Body mass index is 25.34 kg/m². BMI is within normal parameters. No follow-up required.      Assessment/Plan   Patient Self-Management and Personalized Health Advice  The patient has been provided with information about: exercise and designing advance directives and preventive services including:   · Advance directive, TdaP vaccine, Zostavax vaccine (Herpes Zoster).    Visit Diagnoses:    ICD-10-CM ICD-9-CM   1. Medicare annual wellness visit, subsequent Z00.00 V70.0       No orders of the defined types were placed in this encounter.      Outpatient Encounter Prescriptions as of 7/9/2018   Medication Sig Dispense Refill   • albuterol (PROAIR HFA) 108 (90 Base) MCG/ACT inhaler Inhale 2 puffs Every 4 (Four) Hours As Needed for Wheezing. 18 g 6   • albuterol (PROVENTIL) (2.5 MG/3ML) 0.083% nebulizer solution Take 2.5 mg by nebulization 4 (Four) Times a Day. 125 vial 6   • albuterol (PROVENTIL) 2 MG tablet Take 1 tablet by mouth 4 (Four) Times a Day. 120 tablet 6   • Fluticasone Furoate-Vilanterol (BREO ELLIPTA) 200-25 MCG/INH aerosol powder  Inhale 1 puff Daily. 28 each 6   • " montelukast (SINGULAIR) 10 MG tablet Take 1 tablet by mouth Daily. 30 tablet 6   • O2 (OXYGEN) Inhale 1 (One) Time. 3 LPM HS only     • PRALUENT 150 MG/ML solution pen-injector Inject 150 mg under the skin Every 30 (Thirty) Days. 3 pen 3   • Respiratory Therapy Supplies (NEBULIZER) device      • SYNTHROID 100 MCG tablet Take 1 tablet by mouth Daily. On empty stomach 30 tablet 5   • Umeclidinium Bromide (INCRUSE ELLIPTA) 62.5 MCG/INH aerosol powder  Inhale 1 puff Daily. 30 each 6   • vitamin D (ERGOCALCIFEROL) 03274 units capsule capsule Take 1 capsule by mouth Every 7 (Seven) Days. 13 capsule 3     No facility-administered encounter medications on file as of 7/9/2018.        Reviewed use of high risk medication in the elderly: yes  Reviewed for potential of harmful drug interactions in the elderly: yes    Follow Up:  Return in about 1 year (around 7/9/2019) for Medicare Wellness.     An After Visit Summary and PPPS with all of these plans were given to the patient.

## 2018-07-09 NOTE — PATIENT INSTRUCTIONS
Medicare Wellness  Personal Prevention Plan of Service     Date of Office Visit:  2018  Encounter Provider:  SHANT Pina  Place of Service:  [facility]  Patient Name: Nguyễn Santana  :  1960    As part of the Medicare Wellness portion of your visit today, we are providing you with this personalized preventive plan of services (PPPS). This plan is based upon recommendations of the [place] Preventive Services Task Force (USPSTF) and the Advisory Committee on Immunization Practices (ACIP).    This lists the preventive care services that should be considered, and provides dates of when you are due. Items listed as completed are up-to-date and do not require any further intervention.    Health Maintenance   Topic Date Due   • TDAP/TD VACCINES (1 - Tdap) 1981   • ZOSTER VACCINE (1 of 2) 2012   • MEDICARE ANNUAL WELLNESS  2016   • INFLUENZA VACCINE  2018   • COLONOSCOPY  2022   • HEPATITIS C SCREENING  Completed       No orders of the defined types were placed in this encounter.      Return in about 1 year (around 2019) for Medicare Wellness.

## 2018-08-14 ENCOUNTER — HOSPITAL ENCOUNTER (OUTPATIENT)
Dept: CT IMAGING | Facility: HOSPITAL | Age: 58
Discharge: HOME | End: 2018-08-14
Admitting: NURSE PRACTITIONER

## 2018-08-14 DIAGNOSIS — R91.1 NODULE OF RIGHT LUNG: ICD-10-CM

## 2018-08-14 PROCEDURE — 71250 CT THORAX DX C-: CPT

## 2018-08-27 ENCOUNTER — TELEPHONE (OUTPATIENT)
Dept: ENDOCRINOLOGY | Age: 58
End: 2018-08-27

## 2018-08-27 RX ORDER — PITAVASTATIN CALCIUM 4.18 MG/1
TABLET, FILM COATED ORAL
Qty: 30 TABLET | Refills: 5 | Status: SHIPPED | OUTPATIENT
Start: 2018-08-27 | End: 2018-10-26 | Stop reason: SDUPTHER

## 2018-08-27 NOTE — TELEPHONE ENCOUNTER
----- Message from Lion West MD sent at 8/27/2018  9:13 AM EDT -----  This try Livalo and see if he can tolerate it.  Especially he has had no prior exposure to it.  ----- Message -----  From: Janay Lerner MA  Sent: 8/27/2018   7:38 AM  To: Lion West MD    Pt's insurance will not pay for praluent due to the fact the patient does not have a history of ascvd. Please advise.

## 2018-10-26 ENCOUNTER — OFFICE VISIT (OUTPATIENT)
Dept: FAMILY MEDICINE CLINIC | Facility: CLINIC | Age: 58
End: 2018-10-26

## 2018-10-26 VITALS
WEIGHT: 168 LBS | HEIGHT: 68 IN | OXYGEN SATURATION: 95 % | HEART RATE: 78 BPM | TEMPERATURE: 97.8 F | DIASTOLIC BLOOD PRESSURE: 68 MMHG | BODY MASS INDEX: 25.46 KG/M2 | SYSTOLIC BLOOD PRESSURE: 122 MMHG

## 2018-10-26 DIAGNOSIS — J44.9 CHRONIC OBSTRUCTIVE PULMONARY DISEASE, UNSPECIFIED COPD TYPE (HCC): ICD-10-CM

## 2018-10-26 DIAGNOSIS — Z23 ENCOUNTER FOR ADMINISTRATION OF VACCINE: Primary | ICD-10-CM

## 2018-10-26 DIAGNOSIS — E55.9 VITAMIN D DEFICIENCY: ICD-10-CM

## 2018-10-26 PROCEDURE — 99213 OFFICE O/P EST LOW 20 MIN: CPT | Performed by: NURSE PRACTITIONER

## 2018-10-26 PROCEDURE — 90662 IIV NO PRSV INCREASED AG IM: CPT | Performed by: NURSE PRACTITIONER

## 2018-10-26 PROCEDURE — G0008 ADMIN INFLUENZA VIRUS VAC: HCPCS | Performed by: NURSE PRACTITIONER

## 2018-10-26 RX ORDER — PREDNISONE 10 MG/1
TABLET ORAL
COMMUNITY
Start: 2018-10-03 | End: 2018-10-26 | Stop reason: SDUPTHER

## 2018-10-26 RX ORDER — ALBUTEROL SULFATE 90 UG/1
2 AEROSOL, METERED RESPIRATORY (INHALATION) EVERY 4 HOURS PRN
Qty: 18 G | Refills: 6 | Status: SHIPPED | OUTPATIENT
Start: 2018-10-26 | End: 2019-03-08 | Stop reason: SDUPTHER

## 2018-10-26 RX ORDER — DEXTROMETHORPHAN HYDROBROMIDE AND PROMETHAZINE HYDROCHLORIDE 15; 6.25 MG/5ML; MG/5ML
5 SYRUP ORAL 4 TIMES DAILY PRN
Qty: 473 ML | Refills: 0 | Status: SHIPPED | OUTPATIENT
Start: 2018-10-26 | End: 2019-04-26

## 2018-10-26 RX ORDER — ERGOCALCIFEROL 1.25 MG/1
50000 CAPSULE ORAL
Qty: 13 CAPSULE | Refills: 3 | Status: SHIPPED | OUTPATIENT
Start: 2018-10-26 | End: 2019-07-16 | Stop reason: SDUPTHER

## 2018-10-26 RX ORDER — LEVOTHYROXINE SODIUM 100 MCG
100 TABLET ORAL DAILY
Qty: 30 TABLET | Refills: 6 | Status: SHIPPED | OUTPATIENT
Start: 2018-10-26 | End: 2019-04-08

## 2018-10-26 RX ORDER — PREDNISONE 10 MG/1
10 TABLET ORAL DAILY
Qty: 30 TABLET | Refills: 6 | Status: SHIPPED | OUTPATIENT
Start: 2018-10-26 | End: 2018-12-24

## 2018-10-26 RX ORDER — MONTELUKAST SODIUM 10 MG/1
10 TABLET ORAL DAILY
Qty: 30 TABLET | Refills: 6 | Status: SHIPPED | OUTPATIENT
Start: 2018-10-26 | End: 2019-10-29 | Stop reason: SDUPTHER

## 2018-10-26 RX ORDER — ALBUTEROL SULFATE 2.5 MG/3ML
2.5 SOLUTION RESPIRATORY (INHALATION)
Qty: 125 VIAL | Refills: 6 | Status: SHIPPED | OUTPATIENT
Start: 2018-10-26

## 2018-10-26 RX ORDER — PITAVASTATIN CALCIUM 4.18 MG/1
TABLET, FILM COATED ORAL
Qty: 30 TABLET | Refills: 6 | Status: SHIPPED | OUTPATIENT
Start: 2018-10-26 | End: 2019-07-16 | Stop reason: SDUPTHER

## 2018-10-26 NOTE — PROGRESS NOTES
Subjective   Nguyễn Santana is a 58 y.o. male. Patient seen today for med management for COPD, complaining of increased cough and increased mucus     History of Present Illness 58-year-old  male presents to the office today to discuss long-term usage of medication to control his COPD which is a pro-air rescue inhaler, albuterol nebulizer solution, Proventil 2 mg tablet per day. 30 Elipta aerosol powder, Singulair, 10 mg tablet prednisone per day and increase Elipta aerosol powder. Patient does have complaint of increased cough and increased white mucus. This is common this time year with the weather changing. If mucus turns green, or he starts running a temperature to let me know immediately.    The following portions of the patient's history were reviewed and updated as appropriate: allergies, current medications, past family history, past medical history, past social history, past surgical history and problem list.    Review of Systems   Respiratory: Positive for cough (with increased mucus ).         COPD   All other systems reviewed and are negative.      Objective   Physical Exam   Constitutional: He is oriented to person, place, and time. He appears well-developed and well-nourished.   HENT:   Head: Normocephalic and atraumatic.   Eyes: Pupils are equal, round, and reactive to light. Conjunctivae and EOM are normal.   Neck: Normal range of motion. Neck supple.   Cardiovascular: Normal rate.    Pulmonary/Chest: Effort normal.   Expiratory wheeze left upper lung. Using oxygen 4 L per nasal cannula at home, not on oxygen today in office.   Abdominal: Soft. Bowel sounds are normal.   Musculoskeletal: Normal range of motion.   Neurological: He is alert and oriented to person, place, and time.   Skin: Skin is warm and dry. Capillary refill takes less than 2 seconds.   Psychiatric: He has a normal mood and affect. His behavior is normal. Judgment and thought content normal.   Nursing note and vitals  reviewed.      Assessment/Plan   Nguyễn was seen today for med management and cough.    Diagnoses and all orders for this visit:    Chronic obstructive pulmonary disease, unspecified COPD type (CMS/[facility])  -     albuterol (PROAIR HFA) 108 (90 Base) MCG/ACT inhaler; Inhale 2 puffs Every 4 (Four) Hours As Needed for Wheezing. Patient will call when needed    Vitamin D deficiency  -     vitamin D (ERGOCALCIFEROL) 54701 units capsule capsule; Take 1 capsule by mouth Every 7 (Seven) Days. Patient will call when needed    Other orders  -     albuterol (PROVENTIL) (2.5 MG/3ML) 0.083% nebulizer solution; Take 2.5 mg by nebulization 4 (Four) Times a Day. Patient will call when needed  -     albuterol (PROVENTIL) 2 MG tablet; Take 1 tablet by mouth 4 (Four) Times a Day.  -     Fluticasone Furoate-Vilanterol (BREO ELLIPTA) 200-25 MCG/INH aerosol powder ; Inhale 1 puff Daily. Patient will call when needed  -     LIVALO 4 MG tablet; Take 1 tablet by mouth daily. Patient will call when needed  -     montelukast (SINGULAIR) 10 MG tablet; Take 1 tablet by mouth Daily. Patient will call when needed  -     predniSONE (DELTASONE) 10 MG tablet; Take 1 tablet by mouth Daily. Patient will call when needed  -     SYNTHROID 100 MCG tablet; Take 1 tablet by mouth Daily. Patient will call when needed. On empty stomach,  -     Umeclidinium Bromide (INCRUSE ELLIPTA) 62.5 MCG/INH aerosol powder ; Inhale 1 puff Daily. Patient will call when needed  -     promethazine-dextromethorphan (PROMETHAZINE-DM) 6.25-15 MG/5ML syrup; Take 5 mL by mouth 4 (Four) Times a Day As Needed for Cough.    Medications renewed as needed. Patient will follow-up after seeing specialist who are drawing labs next month. Any labs that I might need that they do not draw I will draw on the next visit. Encouraged patient to stay in doors and climate-controlled environment. Sipping fluids constantly during the day and using cough syrup to help control his cough. We will notify  office immediately for any decline in health status. Encouraged to follow-up with us at least every 6 months and as needed. Flu vaccine to be given prior to leaving office today.

## 2018-11-26 ENCOUNTER — LAB (OUTPATIENT)
Dept: ENDOCRINOLOGY | Age: 58
End: 2018-11-26

## 2018-11-26 DIAGNOSIS — E03.8 HYPOTHYROIDISM DUE TO HASHIMOTO'S THYROIDITIS: ICD-10-CM

## 2018-11-26 DIAGNOSIS — E06.3 HYPOTHYROIDISM DUE TO HASHIMOTO'S THYROIDITIS: ICD-10-CM

## 2018-11-26 DIAGNOSIS — E78.5 DYSLIPIDEMIA: ICD-10-CM

## 2018-11-26 DIAGNOSIS — E55.9 VITAMIN D DEFICIENCY: Primary | ICD-10-CM

## 2018-11-26 DIAGNOSIS — E55.9 VITAMIN D DEFICIENCY: ICD-10-CM

## 2018-11-27 LAB
25(OH)D3+25(OH)D2 SERPL-MCNC: 48.2 NG/ML (ref 30–100)
ALBUMIN SERPL-MCNC: 4.6 G/DL (ref 3.5–5.2)
ALBUMIN/GLOB SERPL: 1.8 G/DL
ALP SERPL-CCNC: 86 U/L (ref 39–117)
ALT SERPL-CCNC: 14 U/L (ref 1–41)
AST SERPL-CCNC: 16 U/L (ref 1–40)
BILIRUB SERPL-MCNC: 0.8 MG/DL (ref 0.1–1.2)
BUN SERPL-MCNC: 13 MG/DL (ref 6–20)
BUN/CREAT SERPL: 11.6 (ref 7–25)
CALCIUM SERPL-MCNC: 9.7 MG/DL (ref 8.6–10.5)
CHLORIDE SERPL-SCNC: 101 MMOL/L (ref 98–107)
CHOLEST SERPL-MCNC: 191 MG/DL (ref 0–200)
CO2 SERPL-SCNC: 25.2 MMOL/L (ref 22–29)
CREAT SERPL-MCNC: 1.12 MG/DL (ref 0.76–1.27)
FT4I SERPL CALC-MCNC: 3.2 (ref 1.2–4.9)
GLOBULIN SER CALC-MCNC: 2.5 GM/DL
GLUCOSE SERPL-MCNC: 107 MG/DL (ref 65–99)
HDLC SERPL-MCNC: 60 MG/DL (ref 40–60)
INTERPRETATION: NORMAL
LDLC SERPL CALC-MCNC: 91 MG/DL (ref 0–100)
POTASSIUM SERPL-SCNC: 4 MMOL/L (ref 3.5–5.2)
PROT SERPL-MCNC: 7.1 G/DL (ref 6–8.5)
SODIUM SERPL-SCNC: 141 MMOL/L (ref 136–145)
T3FREE SERPL-MCNC: 2.8 PG/ML (ref 2–4.4)
T3RU NFR SERPL: 33 % (ref 24–39)
T4 FREE SERPL-MCNC: 1.91 NG/DL (ref 0.93–1.7)
T4 SERPL-MCNC: 9.7 UG/DL (ref 4.5–12)
TRIGL SERPL-MCNC: 200 MG/DL (ref 0–150)
TSH SERPL DL<=0.005 MIU/L-ACNC: 1.03 UIU/ML (ref 0.45–4.5)
VLDLC SERPL CALC-MCNC: 40 MG/DL (ref 5–40)

## 2018-11-30 ENCOUNTER — RESULTS ENCOUNTER (OUTPATIENT)
Dept: ENDOCRINOLOGY | Age: 58
End: 2018-11-30

## 2018-11-30 DIAGNOSIS — E06.3 HYPOTHYROIDISM DUE TO HASHIMOTO'S THYROIDITIS: ICD-10-CM

## 2018-11-30 DIAGNOSIS — E03.8 HYPOTHYROIDISM DUE TO HASHIMOTO'S THYROIDITIS: ICD-10-CM

## 2018-11-30 DIAGNOSIS — E78.5 DYSLIPIDEMIA: ICD-10-CM

## 2018-11-30 DIAGNOSIS — E55.9 VITAMIN D DEFICIENCY: ICD-10-CM

## 2018-11-30 DIAGNOSIS — R53.82 CHRONIC FATIGUE: ICD-10-CM

## 2018-12-10 ENCOUNTER — OFFICE VISIT (OUTPATIENT)
Dept: ENDOCRINOLOGY | Age: 58
End: 2018-12-10

## 2018-12-10 VITALS
SYSTOLIC BLOOD PRESSURE: 138 MMHG | WEIGHT: 171 LBS | HEIGHT: 68 IN | BODY MASS INDEX: 25.91 KG/M2 | DIASTOLIC BLOOD PRESSURE: 70 MMHG

## 2018-12-10 DIAGNOSIS — E78.5 DYSLIPIDEMIA: ICD-10-CM

## 2018-12-10 DIAGNOSIS — E03.8 HYPOTHYROIDISM DUE TO HASHIMOTO'S THYROIDITIS: Primary | ICD-10-CM

## 2018-12-10 DIAGNOSIS — E55.9 VITAMIN D DEFICIENCY: ICD-10-CM

## 2018-12-10 DIAGNOSIS — E06.3 HYPOTHYROIDISM DUE TO HASHIMOTO'S THYROIDITIS: Primary | ICD-10-CM

## 2018-12-10 PROCEDURE — 99213 OFFICE O/P EST LOW 20 MIN: CPT | Performed by: NURSE PRACTITIONER

## 2018-12-10 NOTE — PROGRESS NOTES
Subjective    Nguyễn Santana is a 58 y.o. male. he is here to be seen for hypothyroidism.     Hypothyroidism   This is a chronic problem. The current episode started more than 1 month ago. The problem occurs constantly. The problem has been gradually improving. Associated symptoms include fatigue. Pertinent negatives include no coughing, headaches, myalgias or rash. Nothing aggravates the symptoms. Treatments tried: meds and labs. The treatment provided no relief.   Hyperlipidemia   This is a chronic problem. Exacerbating diseases include hypothyroidism. Pertinent negatives include no myalgias.        Evaluation history:  TSH   Date Value Ref Range Status   11/26/2018 1.030 0.450 - 4.500 uIU/mL Final     Free T4   Date Value Ref Range Status   11/26/2018 1.91 (H) 0.93 - 1.70 ng/dL Final     T3, Free   Date Value Ref Range Status   11/26/2018 2.8 2.0 - 4.4 pg/mL Final       Current medications:  Current Outpatient Medications   Medication Sig Dispense Refill   • albuterol (PROAIR HFA) 108 (90 Base) MCG/ACT inhaler Inhale 2 puffs Every 4 (Four) Hours As Needed for Wheezing. Patient will call when needed 18 g 6   • albuterol (PROVENTIL) (2.5 MG/3ML) 0.083% nebulizer solution Take 2.5 mg by nebulization 4 (Four) Times a Day. Patient will call when needed 125 vial 6   • albuterol (PROVENTIL) 2 MG tablet Take 1 tablet by mouth 4 (Four) Times a Day. 120 tablet 6   • Fluticasone Furoate-Vilanterol (BREO ELLIPTA) 200-25 MCG/INH aerosol powder  Inhale 1 puff Daily. Patient will call when needed 28 each 6   • LIVALO 4 MG tablet Take 1 tablet by mouth daily. Patient will call when needed 30 tablet 6   • montelukast (SINGULAIR) 10 MG tablet Take 1 tablet by mouth Daily. Patient will call when needed 30 tablet 6   • O2 (OXYGEN) Inhale 4 L/min 1 (One) Time. 4 LPM HS only     • predniSONE (DELTASONE) 10 MG tablet Take 1 tablet by mouth Daily. Patient will call when needed 30 tablet 6   • promethazine-dextromethorphan  (PROMETHAZINE-DM) 6.25-15 MG/5ML syrup Take 5 mL by mouth 4 (Four) Times a Day As Needed for Cough. 473 mL 0   • Respiratory Therapy Supplies (NEBULIZER) device      • SYNTHROID 100 MCG tablet Take 1 tablet by mouth Daily. Patient will call when needed. On empty stomach, 30 tablet 6   • Umeclidinium Bromide (INCRUSE ELLIPTA) 62.5 MCG/INH aerosol powder  Inhale 1 puff Daily. Patient will call when needed 30 each 6   • vitamin D (ERGOCALCIFEROL) 11407 units capsule capsule Take 1 capsule by mouth Every 7 (Seven) Days. Patient will call when needed 13 capsule 3     No current facility-administered medications for this visit.        The following portions of the patient's history were reviewed and updated as appropriate:   Past Medical History:   Diagnosis Date   • Allergic rhinitis    • Anxiety    • COPD (chronic obstructive pulmonary disease) (CMS/HCC)    • Depression    • Diastolic dysfunction    • Hypertension    • Hypothyroidism    • Hypoxia    • On home oxygen therapy    • Peripheral vascular disease (CMS/HCC)    • Pneumonia    • Pulmonary nodule    • Sleep apnea    • Syncope      Past Surgical History:   Procedure Laterality Date   • VASECTOMY       Family History   Problem Relation Age of Onset   • Anxiety disorder Mother    • Depression Mother    • Colon cancer Mother 73   • Uterine cancer Mother    • Cancer Mother         Colon.    • Emphysema Father    • Lung cancer Father    • Stroke Father    • Heart attack Maternal Grandmother    • Stroke Maternal Grandmother    • Aneurysm Maternal Grandfather    • Heart attack Maternal Grandfather    • Heart attack Paternal Grandmother    • Heart attack Paternal Grandfather        Allergies   Allergen Reactions   • Levothyroxine      Patient cannot take the generic. He can only take the brand name.     Social History     Socioeconomic History   • Marital status:      Spouse name: Not on file   • Number of children: Not on file   • Years of education: Not on file    • Highest education level: Not on file   Tobacco Use   • Smoking status: Former Smoker     Packs/day: 1.00     Types: Cigarettes     Start date: 2/5/1974     Last attempt to quit: 6/5/2015     Years since quitting: 3.5   • Smokeless tobacco: Never Used   • Tobacco comment: Began smoking at age 12.  Smoked 1.5 ppd for 6 years and 1 ppd for 36 years for a 45 pack year history.  She says that she quit 3 years ago even though she reports still smoking 1 or 2 cigarettes per week.   Substance and Sexual Activity   • Alcohol use: Yes     Alcohol/week: 1.2 oz     Types: 2 Cans of beer per week     Comment: Drinks 1 or 2 beers  or whiskey weekly.   • Drug use: No   • Sexual activity: No     Birth control/protection: None       Review of Systems  Review of Systems   Constitutional: Positive for fatigue.   HENT: Positive for trouble swallowing.    Eyes: Negative for visual disturbance.   Respiratory: Negative for cough.    Cardiovascular: Negative for palpitations.   Gastrointestinal: Negative for constipation.   Endocrine: Positive for cold intolerance and heat intolerance.   Genitourinary: Negative for difficulty urinating.   Musculoskeletal: Negative for myalgias.   Skin: Negative for rash.   Allergic/Immunologic: Negative.    Neurological: Negative for headaches.   Hematological: Bruises/bleeds easily.   Psychiatric/Behavioral: The patient is nervous/anxious.      Lab on 11/26/2018   Component Date Value Ref Range Status   • TSH 11/26/2018 1.030  0.450 - 4.500 uIU/mL Final   • T4, Total 11/26/2018 9.7  4.5 - 12.0 ug/dL Final   • T3 Uptake 11/26/2018 33  24 - 39 % Final   • Free Thyroxine Index 11/26/2018 3.2  1.2 - 4.9 Final   • T3, Free 11/26/2018 2.8  2.0 - 4.4 pg/mL Final   • Free T4 11/26/2018 1.91* 0.93 - 1.70 ng/dL Final   • 25 Hydroxy, Vitamin D 11/26/2018 48.2  30.0 - 100.0 ng/ml Final    Comment: Reference Range for Total Vitamin D 25(OH)  Deficiency    <20.0 ng/mL  Insufficiency 21-29 ng/mL  Sufficiency     ng/mL  Toxicity      >100 ng/ml        • Glucose 11/26/2018 107* 65 - 99 mg/dL Final   • BUN 11/26/2018 13  6 - 20 mg/dL Final   • Creatinine 11/26/2018 1.12  0.76 - 1.27 mg/dL Final   • eGFR Non  Am 11/26/2018 68  >60 mL/min/1.73 Final   • eGFR African Am 11/26/2018 82  >60 mL/min/1.73 Final   • BUN/Creatinine Ratio 11/26/2018 11.6  7.0 - 25.0 Final   • Sodium 11/26/2018 141  136 - 145 mmol/L Final   • Potassium 11/26/2018 4.0  3.5 - 5.2 mmol/L Final   • Chloride 11/26/2018 101  98 - 107 mmol/L Final   • Total CO2 11/26/2018 25.2  22.0 - 29.0 mmol/L Final   • Calcium 11/26/2018 9.7  8.6 - 10.5 mg/dL Final   • Total Protein 11/26/2018 7.1  6.0 - 8.5 g/dL Final   • Albumin 11/26/2018 4.60  3.50 - 5.20 g/dL Final   • Globulin 11/26/2018 2.5  gm/dL Final   • A/G Ratio 11/26/2018 1.8  g/dL Final   • Total Bilirubin 11/26/2018 0.8  0.1 - 1.2 mg/dL Final   • Alkaline Phosphatase 11/26/2018 86  39 - 117 U/L Final   • AST (SGOT) 11/26/2018 16  1 - 40 U/L Final   • ALT (SGPT) 11/26/2018 14  1 - 41 U/L Final   • Total Cholesterol 11/26/2018 191  0 - 200 mg/dL Final   • Triglycerides 11/26/2018 200* 0 - 150 mg/dL Final   • HDL Cholesterol 11/26/2018 60  40 - 60 mg/dL Final   • VLDL Cholesterol 11/26/2018 40  5 - 40 mg/dL Final   • LDL Cholesterol  11/26/2018 91  0 - 100 mg/dL Final   • Interpretation 11/26/2018 Note   Final    Supplemental report is available.        Objective      Wt Readings from Last 3 Encounters:   12/10/18 77.6 kg (171 lb)   10/26/18 76.2 kg (168 lb)   07/09/18 76.2 kg (168 lb)     Temp Readings from Last 3 Encounters:   10/26/18 97.8 °F (36.6 °C) (Oral)   07/09/18 98.1 °F (36.7 °C) (Oral)   06/05/18 98.8 °F (37.1 °C) (Oral)     BP Readings from Last 3 Encounters:   12/10/18 138/70   10/26/18 122/68   07/09/18 110/78     Pulse Readings from Last 3 Encounters:   10/26/18 78   07/09/18 78   06/05/18 72     Physical Exam   Constitutional: He is oriented to person, place, and time. He appears  well-developed and well-nourished. No distress.   HENT:   Head: Normocephalic and atraumatic.   Eyes: EOM are normal. Pupils are equal, round, and reactive to light.   Neck: Normal range of motion. Neck supple.   Cardiovascular: Normal rate, regular rhythm, normal heart sounds and intact distal pulses.   No murmur heard.  Pulmonary/Chest: Effort normal and breath sounds normal.   Abdominal: Soft. Bowel sounds are normal.   Musculoskeletal: Normal range of motion.   Neurological: He is alert and oriented to person, place, and time.   Skin: Skin is warm and dry. Capillary refill takes 2 to 3 seconds. He is not diaphoretic. No pallor.   Psychiatric: He has a normal mood and affect. His behavior is normal. Judgment and thought content normal.   Nursing note and vitals reviewed.      Lab Review  Lab Results   Component Value Date    TSH 1.030 11/26/2018     Lab Results   Component Value Date    FREET4 1.91 (H) 11/26/2018        Lab on 11/26/2018   Component Date Value Ref Range Status   • TSH 11/26/2018 1.030  0.450 - 4.500 uIU/mL Final   • T4, Total 11/26/2018 9.7  4.5 - 12.0 ug/dL Final   • T3 Uptake 11/26/2018 33  24 - 39 % Final   • Free Thyroxine Index 11/26/2018 3.2  1.2 - 4.9 Final   • T3, Free 11/26/2018 2.8  2.0 - 4.4 pg/mL Final   • Free T4 11/26/2018 1.91* 0.93 - 1.70 ng/dL Final   • 25 Hydroxy, Vitamin D 11/26/2018 48.2  30.0 - 100.0 ng/ml Final    Comment: Reference Range for Total Vitamin D 25(OH)  Deficiency    <20.0 ng/mL  Insufficiency 21-29 ng/mL  Sufficiency    ng/mL  Toxicity      >100 ng/ml        • Glucose 11/26/2018 107* 65 - 99 mg/dL Final   • BUN 11/26/2018 13  6 - 20 mg/dL Final   • Creatinine 11/26/2018 1.12  0.76 - 1.27 mg/dL Final   • eGFR Non  Am 11/26/2018 68  >60 mL/min/1.73 Final   • eGFR African Am 11/26/2018 82  >60 mL/min/1.73 Final   • BUN/Creatinine Ratio 11/26/2018 11.6  7.0 - 25.0 Final   • Sodium 11/26/2018 141  136 - 145 mmol/L Final   • Potassium 11/26/2018 4.0  3.5  - 5.2 mmol/L Final   • Chloride 11/26/2018 101  98 - 107 mmol/L Final   • Total CO2 11/26/2018 25.2  22.0 - 29.0 mmol/L Final   • Calcium 11/26/2018 9.7  8.6 - 10.5 mg/dL Final   • Total Protein 11/26/2018 7.1  6.0 - 8.5 g/dL Final   • Albumin 11/26/2018 4.60  3.50 - 5.20 g/dL Final   • Globulin 11/26/2018 2.5  gm/dL Final   • A/G Ratio 11/26/2018 1.8  g/dL Final   • Total Bilirubin 11/26/2018 0.8  0.1 - 1.2 mg/dL Final   • Alkaline Phosphatase 11/26/2018 86  39 - 117 U/L Final   • AST (SGOT) 11/26/2018 16  1 - 40 U/L Final   • ALT (SGPT) 11/26/2018 14  1 - 41 U/L Final   • Total Cholesterol 11/26/2018 191  0 - 200 mg/dL Final   • Triglycerides 11/26/2018 200* 0 - 150 mg/dL Final   • HDL Cholesterol 11/26/2018 60  40 - 60 mg/dL Final   • VLDL Cholesterol 11/26/2018 40  5 - 40 mg/dL Final   • LDL Cholesterol  11/26/2018 91  0 - 100 mg/dL Final   • Interpretation 11/26/2018 Note   Final    Supplemental report is available.     Assessment/Plan      1. Hypothyroidism due to Hashimoto's thyroiditis    2. Vitamin D deficiency    3. Dyslipidemia    . This diagnosis was discussed and reviewed with the patient including the advantages of drug therapy.     1. Orders placed during this encounter include:  Orders Placed This Encounter   Procedures   • Testosterone, Free, Total   • Testosterone   • Luteinizing Hormone   • Follicle Stimulating Hormone       Medications prescribed:  Outpatient Encounter Medications as of 12/10/2018   Medication Sig Dispense Refill   • albuterol (PROAIR HFA) 108 (90 Base) MCG/ACT inhaler Inhale 2 puffs Every 4 (Four) Hours As Needed for Wheezing. Patient will call when needed 18 g 6   • albuterol (PROVENTIL) (2.5 MG/3ML) 0.083% nebulizer solution Take 2.5 mg by nebulization 4 (Four) Times a Day. Patient will call when needed 125 vial 6   • albuterol (PROVENTIL) 2 MG tablet Take 1 tablet by mouth 4 (Four) Times a Day. 120 tablet 6   • Fluticasone Furoate-Vilanterol (BREO ELLIPTA) 200-25 MCG/INH  aerosol powder  Inhale 1 puff Daily. Patient will call when needed 28 each 6   • LIVALO 4 MG tablet Take 1 tablet by mouth daily. Patient will call when needed 30 tablet 6   • montelukast (SINGULAIR) 10 MG tablet Take 1 tablet by mouth Daily. Patient will call when needed 30 tablet 6   • O2 (OXYGEN) Inhale 4 L/min 1 (One) Time. 4 LPM HS only     • predniSONE (DELTASONE) 10 MG tablet Take 1 tablet by mouth Daily. Patient will call when needed 30 tablet 6   • promethazine-dextromethorphan (PROMETHAZINE-DM) 6.25-15 MG/5ML syrup Take 5 mL by mouth 4 (Four) Times a Day As Needed for Cough. 473 mL 0   • Respiratory Therapy Supplies (NEBULIZER) device      • SYNTHROID 100 MCG tablet Take 1 tablet by mouth Daily. Patient will call when needed. On empty stomach, 30 tablet 6   • Umeclidinium Bromide (INCRUSE ELLIPTA) 62.5 MCG/INH aerosol powder  Inhale 1 puff Daily. Patient will call when needed 30 each 6   • vitamin D (ERGOCALCIFEROL) 53139 units capsule capsule Take 1 capsule by mouth Every 7 (Seven) Days. Patient will call when needed 13 capsule 3     No facility-administered encounter medications on file as of 12/10/2018.        2. Repeat s-TSH in before patient's next visit.    3. The risks and benefits of my recommendations, as well as other treatment options were discussed with the patient today. Questions were answered.  Instruction on how to take the synthroid - 30-40 minutes on empty stomach.  PCP - refilled all Endo medications for 6 months. Endo will defer refills to PCP.     Sweating/muscle changes - will check the testosterone levels.     4. Return in about 3 months (around 3/10/2019), or if symptoms worsen or fail to improve, for Recheck. 4 months with Dr. West/ [place] - 2 weeks prior for labs and 8 months with Dr. West / [place] - 2 weeks prior for labs.

## 2018-12-12 LAB
FSH SERPL-ACNC: 7.8 MIU/ML (ref 1.5–12.4)
LH SERPL-ACNC: 8.6 MIU/ML (ref 1.7–8.6)
TESTOST FREE SERPL-MCNC: 8.3 PG/ML (ref 7.2–24)
TESTOST SERPL-MCNC: 854 NG/DL (ref 264–916)

## 2018-12-24 ENCOUNTER — OFFICE VISIT (OUTPATIENT)
Dept: FAMILY MEDICINE CLINIC | Facility: CLINIC | Age: 58
End: 2018-12-24

## 2018-12-24 VITALS
HEART RATE: 99 BPM | SYSTOLIC BLOOD PRESSURE: 124 MMHG | WEIGHT: 173.4 LBS | BODY MASS INDEX: 26.28 KG/M2 | DIASTOLIC BLOOD PRESSURE: 70 MMHG | OXYGEN SATURATION: 94 % | TEMPERATURE: 98.1 F | HEIGHT: 68 IN

## 2018-12-24 DIAGNOSIS — J40 BRONCHITIS: ICD-10-CM

## 2018-12-24 DIAGNOSIS — J06.9 UPPER RESPIRATORY TRACT INFECTION, UNSPECIFIED TYPE: Primary | ICD-10-CM

## 2018-12-24 DIAGNOSIS — J44.9 CHRONIC OBSTRUCTIVE PULMONARY DISEASE, UNSPECIFIED COPD TYPE (HCC): ICD-10-CM

## 2018-12-24 DIAGNOSIS — R06.02 SHORTNESS OF BREATH: ICD-10-CM

## 2018-12-24 DIAGNOSIS — J01.90 ACUTE SINUSITIS, RECURRENCE NOT SPECIFIED, UNSPECIFIED LOCATION: ICD-10-CM

## 2018-12-24 DIAGNOSIS — Z99.81 OXYGEN DEPENDENT: ICD-10-CM

## 2018-12-24 PROCEDURE — 99213 OFFICE O/P EST LOW 20 MIN: CPT | Performed by: PHYSICIAN ASSISTANT

## 2018-12-24 RX ORDER — LEVOFLOXACIN 750 MG/1
750 TABLET ORAL DAILY
Qty: 10 TABLET | Refills: 0 | Status: SHIPPED | OUTPATIENT
Start: 2018-12-24 | End: 2019-04-26

## 2018-12-24 RX ORDER — GUAIFENESIN AND DEXTROMETHORPHAN HYDROBROMIDE 600; 30 MG/1; MG/1
1 TABLET, EXTENDED RELEASE ORAL 2 TIMES DAILY
Qty: 45 TABLET | Refills: 0 | Status: SHIPPED | OUTPATIENT
Start: 2018-12-24 | End: 2019-04-26

## 2018-12-24 RX ORDER — FLUTICASONE PROPIONATE 50 MCG
2 SPRAY, SUSPENSION (ML) NASAL DAILY
Qty: 1 BOTTLE | Refills: 0 | Status: SHIPPED | OUTPATIENT
Start: 2018-12-24 | End: 2019-01-23

## 2018-12-24 RX ORDER — TIOTROPIUM BROMIDE INHALATION SPRAY 3.12 UG/1
SPRAY, METERED RESPIRATORY (INHALATION)
COMMUNITY
Start: 2018-12-17 | End: 2019-10-25

## 2018-12-24 NOTE — PROGRESS NOTES
Subjective   Nguyễn Santana is a 58 y.o. male. Patient complaining of headache, cough, chest congestion with gray to yellow sputum for 3 days     History of Present Illness     Has chronic dyspnea, SOA with very little exertion, cough. He sees Dr Pal and reports they have now discussed possible lung transplant. X 3 days, increased head and nasal congestion, PND, and productive cough. He cannot breathe through his nose, so he feels he is not getting his oxygen. Patient denies more SOA than normal. Some lower field pain with coughing.     The following portions of the patient's history were reviewed and updated as appropriate: allergies, current medications, past family history, past medical history, past social history, past surgical history and problem list.    Review of Systems   HENT: Positive for congestion.    Respiratory: Positive for cough and shortness of breath.    Neurological: Positive for headaches.   All other systems reviewed and are negative.      Objective   Physical Exam   Constitutional: He is oriented to person, place, and time. Vital signs are normal. He appears well-developed and well-nourished.   HENT:   Head: Normocephalic and atraumatic.   Right Ear: Tympanic membrane, external ear and ear canal normal.   Left Ear: Tympanic membrane, external ear and ear canal normal.   Nose: Nose normal.   Mouth/Throat: Uvula is midline, oropharynx is clear and moist and mucous membranes are normal.   Eyes: Conjunctivae are normal.   Neck: Neck supple.   Cardiovascular: Normal rate, regular rhythm and normal heart sounds. Exam reveals no gallop and no friction rub.   No murmur heard.  Pulmonary/Chest: Accessory muscle usage present. He is in respiratory distress (with ambulation- patient reports baseline). He has decreased breath sounds. He has wheezes (trace end expiratory wheezing with 2nd exam. none audible initially). He has no rhonchi. He has no rales.   Neurological: He is alert and oriented  to person, place, and time.   Skin: Skin is warm and dry.   Psychiatric: He has a normal mood and affect. His speech is normal and behavior is normal. Judgment and thought content normal. Cognition and memory are normal.   Nursing note and vitals reviewed.      Assessment/Plan   Nguyễn was seen today for shortness of breath, chest congestion and cough.    Diagnoses and all orders for this visit:    Upper respiratory tract infection, unspecified type  -     levoFLOXacin (LEVAQUIN) 750 MG tablet; Take 1 tablet by mouth Daily.  -     guaifenesin-dextromethorphan (MUCINEX DM)  MG tablet sustained-release 12 hour tablet; Take 1 tablet by mouth 2 (Two) Times a Day.  -     fluticasone (FLONASE) 50 MCG/ACT nasal spray; 2 sprays into the nostril(s) as directed by provider Daily for 30 days. Administer 2 sprays in each nostril for each dose.    Bronchitis  -     levoFLOXacin (LEVAQUIN) 750 MG tablet; Take 1 tablet by mouth Daily.  -     guaifenesin-dextromethorphan (MUCINEX DM)  MG tablet sustained-release 12 hour tablet; Take 1 tablet by mouth 2 (Two) Times a Day.  -     fluticasone (FLONASE) 50 MCG/ACT nasal spray; 2 sprays into the nostril(s) as directed by provider Daily for 30 days. Administer 2 sprays in each nostril for each dose.    Acute sinusitis, recurrence not specified, unspecified location  -     levoFLOXacin (LEVAQUIN) 750 MG tablet; Take 1 tablet by mouth Daily.  -     guaifenesin-dextromethorphan (MUCINEX DM)  MG tablet sustained-release 12 hour tablet; Take 1 tablet by mouth 2 (Two) Times a Day.  -     fluticasone (FLONASE) 50 MCG/ACT nasal spray; 2 sprays into the nostril(s) as directed by provider Daily for 30 days. Administer 2 sprays in each nostril for each dose.    Chronic obstructive pulmonary disease, unspecified COPD type (CMS/HCC)    Oxygen dependent    Shortness of breath      Patient Instructions   58 year old male who presents today with worsening sinus and head congestion,  PND, and productive cough (unsure if this is PND or from airways or lungs) x 3 days. He has PMH significant for severe lung disease. He sees Dr Pal, pulmonology, who has discussed lung transplant evaluation with him. Patient was tachypnic and SOA with walking back to exam room, however, he and his wife report this is his baseline without worsening. He has some rhonchi/ creaking sounds with deep inspiration and very slight wheezing (which patient reports is normal for him) but no rales. Normal vital signs today. I consulted Dr Pal about patient, as we have no consult notes to have an updated visit, evaluation, and treatment recommendation for patient. Per Dr Pal, we will avoid steroid if no increased wheezing, he will continue all inhalers and can use his oxygen all the time if needed, and he recommends Levaquin daily. I will start Levaquin 750 mg once daily. I discussed risks or medication with patient. He will also use Flonase 2 sprays in each nostril once daily and Mucinex DM twice daily. He is to be seen asap if he has AE to medication or worsening.

## 2018-12-30 NOTE — PATIENT INSTRUCTIONS
58 year old male who presents today with worsening sinus and head congestion, PND, and productive cough (unsure if this is PND or from airways or lungs) x 3 days. He has PMH significant for severe lung disease. He sees Dr Pal, pulmonology, who has discussed lung transplant evaluation with him. Patient was tachypnic and SOA with walking back to exam room, however, he and his wife report this is his baseline without worsening. He has some rhonchi/ creaking sounds with deep inspiration and very slight wheezing (which patient reports is normal for him) but no rales. Normal vital signs today. I consulted Dr Pal about patient, as we have no consult notes to have an updated visit, evaluation, and treatment recommendation for patient. Per Dr Pal, we will avoid steroid if no increased wheezing, he will continue all inhalers and can use his oxygen all the time if needed, and he recommends Levaquin daily. I will start Levaquin 750 mg once daily. I discussed risks or medication with patient. He will also use Flonase 2 sprays in each nostril once daily and Mucinex DM twice daily. He is to be seen asap if he has AE to medication or worsening.

## 2019-03-04 DIAGNOSIS — E55.9 VITAMIN D DEFICIENCY: Primary | ICD-10-CM

## 2019-03-04 DIAGNOSIS — E03.9 ACQUIRED HYPOTHYROIDISM: ICD-10-CM

## 2019-03-04 DIAGNOSIS — E78.5 DYSLIPIDEMIA: ICD-10-CM

## 2019-03-05 ENCOUNTER — LAB (OUTPATIENT)
Dept: ENDOCRINOLOGY | Age: 59
End: 2019-03-05

## 2019-03-05 DIAGNOSIS — E78.5 DYSLIPIDEMIA: ICD-10-CM

## 2019-03-05 DIAGNOSIS — E55.9 VITAMIN D DEFICIENCY: ICD-10-CM

## 2019-03-05 DIAGNOSIS — E03.9 ACQUIRED HYPOTHYROIDISM: ICD-10-CM

## 2019-03-08 DIAGNOSIS — J44.9 CHRONIC OBSTRUCTIVE PULMONARY DISEASE, UNSPECIFIED COPD TYPE (HCC): ICD-10-CM

## 2019-03-08 RX ORDER — ALBUTEROL SULFATE 90 UG/1
AEROSOL, METERED RESPIRATORY (INHALATION)
Qty: 1 INHALER | Refills: 5 | Status: SHIPPED | OUTPATIENT
Start: 2019-03-08 | End: 2019-06-10 | Stop reason: SDUPTHER

## 2019-03-10 LAB
25(OH)D3+25(OH)D2 SERPL-MCNC: 45.9 NG/ML (ref 30–100)
CHOLEST SERPL-MCNC: 189 MG/DL (ref 0–200)
FT4I SERPL CALC-MCNC: 3 (ref 1.2–4.9)
HDLC SERPL-MCNC: 53 MG/DL (ref 40–60)
INTERPRETATION: NORMAL
LDLC SERPL CALC-MCNC: 114 MG/DL (ref 0–100)
T3FREE SERPL-MCNC: 3.4 PG/ML (ref 2–4.4)
T3RU NFR SERPL: 31 % (ref 24–39)
T4 FREE SERPL-MCNC: 1.76 NG/DL (ref 0.93–1.7)
T4 SERPL-MCNC: 9.8 UG/DL (ref 4.5–12)
THYROGLOB AB SERPL-ACNC: 2 IU/ML (ref 0–0.9)
THYROGLOB SERPL-MCNC: 7.3 NG/ML
TRIGL SERPL-MCNC: 111 MG/DL (ref 0–150)
TSH SERPL DL<=0.005 MIU/L-ACNC: 0.3 UIU/ML (ref 0.45–4.5)
VLDLC SERPL CALC-MCNC: 22.2 MG/DL (ref 5–40)

## 2019-04-08 ENCOUNTER — OFFICE VISIT (OUTPATIENT)
Dept: ENDOCRINOLOGY | Age: 59
End: 2019-04-08

## 2019-04-08 VITALS
BODY MASS INDEX: 26.07 KG/M2 | SYSTOLIC BLOOD PRESSURE: 132 MMHG | DIASTOLIC BLOOD PRESSURE: 74 MMHG | WEIGHT: 172 LBS | HEIGHT: 68 IN

## 2019-04-08 DIAGNOSIS — E06.3 HYPOTHYROIDISM DUE TO HASHIMOTO'S THYROIDITIS: Primary | ICD-10-CM

## 2019-04-08 DIAGNOSIS — E03.9 ACQUIRED HYPOTHYROIDISM: ICD-10-CM

## 2019-04-08 DIAGNOSIS — R53.82 CHRONIC FATIGUE: ICD-10-CM

## 2019-04-08 DIAGNOSIS — E78.5 DYSLIPIDEMIA: ICD-10-CM

## 2019-04-08 DIAGNOSIS — E55.9 VITAMIN D DEFICIENCY: ICD-10-CM

## 2019-04-08 DIAGNOSIS — E03.8 HYPOTHYROIDISM DUE TO HASHIMOTO'S THYROIDITIS: Primary | ICD-10-CM

## 2019-04-08 PROCEDURE — 99214 OFFICE O/P EST MOD 30 MIN: CPT | Performed by: NURSE PRACTITIONER

## 2019-04-08 RX ORDER — LEVOTHYROXINE SODIUM 88 MCG
88 TABLET ORAL DAILY
Qty: 30 TABLET | Refills: 3 | Status: SHIPPED | OUTPATIENT
Start: 2019-04-08 | End: 2019-05-21

## 2019-04-08 NOTE — PROGRESS NOTES
Subjective    Nguyễn Santana is a 58 y.o. male. he is here to be seen for hypothyroidism.     hypothyroid      Hypothyroidism   This is a chronic problem. The current episode started more than 1 year ago. The problem occurs constantly. The problem has been unchanged. Associated symptoms include fatigue. Pertinent negatives include no headaches, myalgias or rash. Nothing aggravates the symptoms. Treatments tried: meds and labs. The treatment provided no relief.        Evaluation history:  TSH   Date Value Ref Range Status   03/05/2019 0.303 (L) 0.450 - 4.500 uIU/mL Final     Free T4   Date Value Ref Range Status   03/05/2019 1.76 (H) 0.93 - 1.70 ng/dL Final     T3, Free   Date Value Ref Range Status   03/05/2019 3.4 2.0 - 4.4 pg/mL Final         Current medications:  Current Outpatient Medications   Medication Sig Dispense Refill   • albuterol (PROVENTIL) (2.5 MG/3ML) 0.083% nebulizer solution Take 2.5 mg by nebulization 4 (Four) Times a Day. Patient will call when needed 125 vial 6   • albuterol (PROVENTIL) 2 MG tablet Take 1 tablet by mouth 4 (Four) Times a Day. 120 tablet 6   • ALBUTEROL SULFATE  (90 Base) MCG/ACT inhaler INHALE TWO PUFFS BY MOUTH EVERY 4 HOURS AS NEEDED FOR WHEEZING 1 inhaler 5   • Fluticasone Furoate-Vilanterol (BREO ELLIPTA) 200-25 MCG/INH aerosol powder  Inhale 1 puff Daily. Patient will call when needed 28 each 6   • guaifenesin-dextromethorphan (MUCINEX DM)  MG tablet sustained-release 12 hour tablet Take 1 tablet by mouth 2 (Two) Times a Day. 45 tablet 0   • levoFLOXacin (LEVAQUIN) 750 MG tablet Take 1 tablet by mouth Daily. 10 tablet 0   • LIVALO 4 MG tablet Take 1 tablet by mouth daily. Patient will call when needed 30 tablet 6   • montelukast (SINGULAIR) 10 MG tablet Take 1 tablet by mouth Daily. Patient will call when needed 30 tablet 6   • O2 (OXYGEN) Inhale 4 L/min 1 (One) Time. 4 LPM HS only     • promethazine-dextromethorphan (PROMETHAZINE-DM) 6.25-15 MG/5ML syrup  Take 5 mL by mouth 4 (Four) Times a Day As Needed for Cough. 473 mL 0   • Respiratory Therapy Supplies (NEBULIZER) device      • SPIRIVA RESPIMAT 2.5 MCG/ACT aerosol solution inhaler      • vitamin D (ERGOCALCIFEROL) 11094 units capsule capsule Take 1 capsule by mouth Every 7 (Seven) Days. Patient will call when needed 13 capsule 3   • SYNTHROID 88 MCG tablet Take 1 tablet by mouth Daily.  on an empty stomach 30 tablet 3     No current facility-administered medications for this visit.        The following portions of the patient's history were reviewed and updated as appropriate:   Past Medical History:   Diagnosis Date   • Allergic rhinitis    • Anxiety    • COPD (chronic obstructive pulmonary disease) (CMS/HCC)    • Depression    • Diastolic dysfunction    • Hypertension    • Hypothyroidism    • Hypoxia    • On home oxygen therapy    • Peripheral vascular disease (CMS/HCC)    • Pneumonia    • Pulmonary nodule    • Sleep apnea    • Syncope      Past Surgical History:   Procedure Laterality Date   • VASECTOMY       Family History   Problem Relation Age of Onset   • Anxiety disorder Mother    • Depression Mother    • Colon cancer Mother 73   • Uterine cancer Mother    • Cancer Mother         Colon.    • Emphysema Father    • Lung cancer Father    • Stroke Father    • Heart attack Maternal Grandmother    • Stroke Maternal Grandmother    • Aneurysm Maternal Grandfather    • Heart attack Maternal Grandfather    • Heart attack Paternal Grandmother    • Heart attack Paternal Grandfather        Allergies   Allergen Reactions   • Levothyroxine      Patient cannot take the generic. He can only take the brand name.     Social History     Socioeconomic History   • Marital status:      Spouse name: Not on file   • Number of children: Not on file   • Years of education: Not on file   • Highest education level: Not on file   Tobacco Use   • Smoking status: Former Smoker     Packs/day: 1.00     Types: Cigarettes     Start  "date: 2/5/1974     Last attempt to quit: 6/5/2015     Years since quitting: 3.8   • Smokeless tobacco: Never Used   • Tobacco comment: Began smoking at age 12.  Smoked 1.5 ppd for 6 years and 1 ppd for 36 years for a 45 pack year history.  She says that she quit 3 years ago even though she reports still smoking 1 or 2 cigarettes per week.   Substance and Sexual Activity   • Alcohol use: Yes     Alcohol/week: 1.2 oz     Types: 2 Cans of beer per week     Comment: Drinks 1 or 2 beers  or whiskey weekly.   • Drug use: No   • Sexual activity: No     Birth control/protection: None       Review of Systems  Review of Systems   Constitutional: Positive for fatigue.   HENT: Negative for trouble swallowing.    Eyes: Negative for visual disturbance.   Respiratory: Negative for choking.    Cardiovascular: Negative for palpitations.   Gastrointestinal: Negative for constipation.   Endocrine: Positive for cold intolerance and heat intolerance.        Patient is taking medication in the AM, within 1/2 hour hot flashes, eats ceral and calms down the hot flashes.    Genitourinary: Negative for difficulty urinating.   Musculoskeletal: Negative for myalgias.   Skin: Negative for rash.   Allergic/Immunologic: Negative.    Neurological: Negative for headaches.   Hematological: Bruises/bleeds easily.   Psychiatric/Behavioral: The patient is nervous/anxious.    All other systems reviewed and are negative.       Objective    /74   Ht 173.4 cm (68.27\")   Wt 78 kg (172 lb)   BMI 25.95 kg/m²        Physical Exam   Constitutional: He is oriented to person, place, and time. He appears well-developed and well-nourished. No distress.   HENT:   Head: Normocephalic and atraumatic.   Eyes: EOM are normal. Pupils are equal, round, and reactive to light.   Neck: Normal range of motion. Neck supple.   Cardiovascular: Normal rate, regular rhythm, normal heart sounds and intact distal pulses.   No murmur heard.  Pulmonary/Chest: Effort normal " and breath sounds normal.   Abdominal: Soft. Bowel sounds are normal.   Musculoskeletal: Normal range of motion.   Neurological: He is alert and oriented to person, place, and time.   Skin: Skin is warm and dry. Capillary refill takes 2 to 3 seconds. He is not diaphoretic. No pallor.   Psychiatric: He has a normal mood and affect. His behavior is normal. Judgment and thought content normal.       Lab Review  Lab Results   Component Value Date    TSH 0.303 (L) 03/05/2019     Lab Results   Component Value Date    FREET4 1.76 (H) 03/05/2019        Assessment/Plan      1. Hypothyroidism due to Hashimoto's thyroiditis    2. Dyslipidemia    3. Acquired hypothyroidism    4. Vitamin D deficiency    5. Chronic fatigue    . This diagnosis was discussed and reviewed with the patient including the advantages of drug therapy.     1. Orders placed during this encounter include:  Orders Placed This Encounter   Procedures   • TSH     Standing Status:   Future   • T4, Free     Standing Status:   Future   • T3, Free     Standing Status:   Future       Medications prescribed:  Outpatient Encounter Medications as of 4/8/2019   Medication Sig Dispense Refill   • albuterol (PROVENTIL) (2.5 MG/3ML) 0.083% nebulizer solution Take 2.5 mg by nebulization 4 (Four) Times a Day. Patient will call when needed 125 vial 6   • albuterol (PROVENTIL) 2 MG tablet Take 1 tablet by mouth 4 (Four) Times a Day. 120 tablet 6   • ALBUTEROL SULFATE  (90 Base) MCG/ACT inhaler INHALE TWO PUFFS BY MOUTH EVERY 4 HOURS AS NEEDED FOR WHEEZING 1 inhaler 5   • Fluticasone Furoate-Vilanterol (BREO ELLIPTA) 200-25 MCG/INH aerosol powder  Inhale 1 puff Daily. Patient will call when needed 28 each 6   • guaifenesin-dextromethorphan (MUCINEX DM)  MG tablet sustained-release 12 hour tablet Take 1 tablet by mouth 2 (Two) Times a Day. 45 tablet 0   • levoFLOXacin (LEVAQUIN) 750 MG tablet Take 1 tablet by mouth Daily. 10 tablet 0   • LIVALO 4 MG tablet Take 1  tablet by mouth daily. Patient will call when needed 30 tablet 6   • montelukast (SINGULAIR) 10 MG tablet Take 1 tablet by mouth Daily. Patient will call when needed 30 tablet 6   • O2 (OXYGEN) Inhale 4 L/min 1 (One) Time. 4 LPM HS only     • promethazine-dextromethorphan (PROMETHAZINE-DM) 6.25-15 MG/5ML syrup Take 5 mL by mouth 4 (Four) Times a Day As Needed for Cough. 473 mL 0   • Respiratory Therapy Supplies (NEBULIZER) device      • SPIRIVA RESPIMAT 2.5 MCG/ACT aerosol solution inhaler      • vitamin D (ERGOCALCIFEROL) 04096 units capsule capsule Take 1 capsule by mouth Every 7 (Seven) Days. Patient will call when needed 13 capsule 3   • [DISCONTINUED] SYNTHROID 100 MCG tablet Take 1 tablet by mouth Daily. Patient will call when needed. On empty stomach, 30 tablet 6   • SYNTHROID 88 MCG tablet Take 1 tablet by mouth Daily.  on an empty stomach 30 tablet 3     No facility-administered encounter medications on file as of 4/8/2019.        2. Repeat s-TSH in 6-8 weeks and before patient's next visit.    3. The risks and benefits of my recommendations, as well as other treatment options were discussed with the patient, spouse and wife today. Questions were answered.    In summary:  Decrease the synthroid to 88mcg daily- recheck labs in 6 weeks.     4. Return in about 3 months (around 7/8/2019), or if symptoms worsen or fail to improve, for Recheck. Labs in 6 weeks, then 3 months - labs 2 weeks prior. 6 months with Dr. West 2 weeks prior.

## 2019-04-26 ENCOUNTER — OFFICE VISIT (OUTPATIENT)
Dept: FAMILY MEDICINE CLINIC | Facility: CLINIC | Age: 59
End: 2019-04-26

## 2019-04-26 ENCOUNTER — TELEPHONE (OUTPATIENT)
Dept: FAMILY MEDICINE CLINIC | Facility: CLINIC | Age: 59
End: 2019-04-26

## 2019-04-26 VITALS
BODY MASS INDEX: 25.34 KG/M2 | DIASTOLIC BLOOD PRESSURE: 64 MMHG | WEIGHT: 167.2 LBS | HEART RATE: 78 BPM | RESPIRATION RATE: 16 BRPM | SYSTOLIC BLOOD PRESSURE: 102 MMHG | HEIGHT: 68 IN | TEMPERATURE: 97.4 F | OXYGEN SATURATION: 97 %

## 2019-04-26 DIAGNOSIS — E78.5 DYSLIPIDEMIA: ICD-10-CM

## 2019-04-26 DIAGNOSIS — E55.9 VITAMIN D DEFICIENCY: ICD-10-CM

## 2019-04-26 DIAGNOSIS — J44.9 CHRONIC OBSTRUCTIVE PULMONARY DISEASE, UNSPECIFIED COPD TYPE (HCC): ICD-10-CM

## 2019-04-26 DIAGNOSIS — F41.8 MIXED ANXIETY DEPRESSIVE DISORDER: ICD-10-CM

## 2019-04-26 DIAGNOSIS — E03.9 ACQUIRED HYPOTHYROIDISM: Primary | ICD-10-CM

## 2019-04-26 PROCEDURE — 99213 OFFICE O/P EST LOW 20 MIN: CPT | Performed by: PHYSICIAN ASSISTANT

## 2019-04-26 RX ORDER — PREDNISONE 10 MG/1
10 TABLET ORAL DAILY
COMMUNITY

## 2019-04-26 NOTE — PATIENT INSTRUCTIONS
58-year-old male who presents today in follow-up of chronic medical problems and specialist.  Patient has COPD, sleep apnea, hypoxia and is oxygen dependent and follows with Dr. Colon.  He was last seen last week and we will follow-up with him in August.  He manages patient's Breo, Singulair, prednisone, Spiriva, albuterol, and oxygen.  He also sees Dr. West and SHANT Hawthorne for hypothyroidism, dyslipidemia, and vitamin D deficiency-they manage his Synthroid, vitamin D, and Livalo.  Patient is up-to-date with appointments and will continue follow-up with specialists as directed by them.  He will follow with us in about 6 months for Medicare wellness and follow-up with specialists.  We will see him 1-2 times yearly to ensure we are up-to-date on his conditions or as needed for acute illnesses.

## 2019-04-26 NOTE — TELEPHONE ENCOUNTER
----- Message from ISAEBL Rudolph sent at 4/26/2019  2:47 PM EDT -----  Please send a copy of my note to Dr. Colon.

## 2019-04-26 NOTE — PROGRESS NOTES
Subjective   Nguyễn Santana is a 58 y.o. male present today for medication management for hypothyroidism, allergies, Vitamin D Deficiency, and COPD.      History of Present Illness     Endocrinology- SHANT Hawthorne/ Dr West- last appt 12/2018- treating for hypothyroidism, lipid, and vitamin d. Last labs 3/2019- follow up labs 5/20/19 and 7/2019 and follow up with Dr eWst 7/2019  Pulmonology- Dr Colon- last appt 1/2019 then 4/2019- he restarted steroid and advised not to stop it.  They have discussed lung transplant consultation, however, patient does not want to have lung transplant and will not see the specialist at this time.  Follow up 8/2019    No new complaints.  He is otherwise stable without acute illness.  Tolerating medications without AE.    The following portions of the patient's history were reviewed and updated as appropriate: allergies, current medications, past family history, past medical history, past social history, past surgical history and problem list.    Review of Systems   All other systems reviewed and are negative.      Objective   Physical Exam   Constitutional: He is oriented to person, place, and time. He appears well-developed.   HENT:   Head: Normocephalic and atraumatic.   Right Ear: External ear normal.   Left Ear: External ear normal.   Eyes: Conjunctivae are normal.   Neck: Carotid bruit is not present. No tracheal deviation present. No thyroid mass and no thyromegaly present.   Cardiovascular: Normal rate, regular rhythm, normal heart sounds and intact distal pulses.   Pulmonary/Chest: Effort normal. He has wheezes.   Neurological: He is alert and oriented to person, place, and time. Gait normal.   Skin: Skin is warm and dry.   Psychiatric: He has a normal mood and affect. His behavior is normal. Judgment and thought content normal.   Nursing note and vitals reviewed.      Assessment/Plan   Nguyễn was seen today for med management.    Diagnoses and all orders for  this visit:    Acquired hypothyroidism  -     CBC & Differential    Vitamin D deficiency  -     Comprehensive Metabolic Panel    Chronic obstructive pulmonary disease, unspecified COPD type (CMS/HCC)  -     CBC & Differential    Mixed anxiety depressive disorder    Dyslipidemia  -     Comprehensive Metabolic Panel      Patient Instructions   58-year-old male who presents today in follow-up of chronic medical problems and specialist.  Patient has COPD, sleep apnea, hypoxia and is oxygen dependent and follows with Dr. Colon.  He was last seen last week and we will follow-up with him in August.  He manages patient's Breo, Singulair, prednisone, Spiriva, albuterol, and oxygen.  He also sees Dr. West and [facility] Hawthorne for hypothyroidism, dyslipidemia, and vitamin D deficiency-they manage his Synthroid, vitamin D, and Livalo.  Patient is up-to-date with appointments and will continue follow-up with specialists as directed by them.  He will follow with us in about 6 months for Medicare wellness and follow-up with specialists.  We will see him 1-2 times yearly to ensure we are up-to-date on his conditions or as needed for acute illnesses.

## 2019-04-27 LAB
ALBUMIN SERPL-MCNC: 4.6 G/DL (ref 3.5–5.5)
ALBUMIN/GLOB SERPL: 2 {RATIO} (ref 1.2–2.2)
ALP SERPL-CCNC: 96 IU/L (ref 39–117)
ALT SERPL-CCNC: 20 IU/L (ref 0–44)
AST SERPL-CCNC: 27 IU/L (ref 0–40)
BASOPHILS # BLD AUTO: 0.1 X10E3/UL (ref 0–0.2)
BASOPHILS NFR BLD AUTO: 1 %
BILIRUB SERPL-MCNC: 0.5 MG/DL (ref 0–1.2)
BUN SERPL-MCNC: 18 MG/DL (ref 6–24)
BUN/CREAT SERPL: 15 (ref 9–20)
CALCIUM SERPL-MCNC: 9.3 MG/DL (ref 8.7–10.2)
CHLORIDE SERPL-SCNC: 103 MMOL/L (ref 96–106)
CO2 SERPL-SCNC: 22 MMOL/L (ref 20–29)
CREAT SERPL-MCNC: 1.23 MG/DL (ref 0.76–1.27)
EOSINOPHIL # BLD AUTO: 0.3 X10E3/UL (ref 0–0.4)
EOSINOPHIL NFR BLD AUTO: 5 %
ERYTHROCYTE [DISTWIDTH] IN BLOOD BY AUTOMATED COUNT: 13.9 % (ref 12.3–15.4)
GLOBULIN SER CALC-MCNC: 2.3 G/DL (ref 1.5–4.5)
GLUCOSE SERPL-MCNC: 94 MG/DL (ref 65–99)
HCT VFR BLD AUTO: 44.9 % (ref 37.5–51)
HGB BLD-MCNC: 15.5 G/DL (ref 13–17.7)
IMM GRANULOCYTES # BLD AUTO: 0 X10E3/UL (ref 0–0.1)
IMM GRANULOCYTES NFR BLD AUTO: 0 %
LYMPHOCYTES # BLD AUTO: 2 X10E3/UL (ref 0.7–3.1)
LYMPHOCYTES NFR BLD AUTO: 29 %
MCH RBC QN AUTO: 30.9 PG (ref 26.6–33)
MCHC RBC AUTO-ENTMCNC: 34.5 G/DL (ref 31.5–35.7)
MCV RBC AUTO: 89 FL (ref 79–97)
MONOCYTES # BLD AUTO: 1.3 X10E3/UL (ref 0.1–0.9)
MONOCYTES NFR BLD AUTO: 19 %
MORPHOLOGY BLD-IMP: (no result)
NEUTROPHILS # BLD AUTO: 3.2 X10E3/UL (ref 1.4–7)
NEUTROPHILS NFR BLD AUTO: 46 %
PLATELET # BLD AUTO: 223 X10E3/UL (ref 150–379)
POTASSIUM SERPL-SCNC: 4.5 MMOL/L (ref 3.5–5.2)
PROT SERPL-MCNC: 6.9 G/DL (ref 6–8.5)
RBC # BLD AUTO: 5.02 X10E6/UL (ref 4.14–5.8)
SODIUM SERPL-SCNC: 140 MMOL/L (ref 134–144)
WBC # BLD AUTO: 6.9 X10E3/UL (ref 3.4–10.8)

## 2019-05-20 ENCOUNTER — RESULTS ENCOUNTER (OUTPATIENT)
Dept: ENDOCRINOLOGY | Age: 59
End: 2019-05-20

## 2019-05-20 DIAGNOSIS — E03.8 HYPOTHYROIDISM DUE TO HASHIMOTO'S THYROIDITIS: ICD-10-CM

## 2019-05-20 DIAGNOSIS — E06.3 HYPOTHYROIDISM DUE TO HASHIMOTO'S THYROIDITIS: ICD-10-CM

## 2019-05-21 DIAGNOSIS — E03.8 HYPOTHYROIDISM DUE TO HASHIMOTO'S THYROIDITIS: Primary | ICD-10-CM

## 2019-05-21 DIAGNOSIS — E06.3 HYPOTHYROIDISM DUE TO HASHIMOTO'S THYROIDITIS: Primary | ICD-10-CM

## 2019-05-21 LAB
T3FREE SERPL-MCNC: 2.4 PG/ML (ref 2–4.4)
T4 FREE SERPL-MCNC: 1.32 NG/DL (ref 0.93–1.7)
TSH SERPL DL<=0.005 MIU/L-ACNC: 5.61 MIU/ML (ref 0.27–4.2)

## 2019-05-21 RX ORDER — LEVOTHYROXINE SODIUM 100 MCG
100 TABLET ORAL DAILY
Qty: 30 TABLET | Refills: 3 | Status: SHIPPED | OUTPATIENT
Start: 2019-05-21 | End: 2019-06-17 | Stop reason: SDUPTHER

## 2019-05-24 ENCOUNTER — TELEPHONE (OUTPATIENT)
Dept: ENDOCRINOLOGY | Age: 59
End: 2019-05-24

## 2019-05-24 NOTE — TELEPHONE ENCOUNTER
Spoke with patient. Informed him of the change and asked him to repeat instructions back to confirm understanding.     ----- Message from SHANT Espinal sent at 5/21/2019 10:49 AM EDT -----  Increase synthroid to 100mcg daily for 5 days then 1/2 tab 2 days. Then recheck 6 weeks.

## 2019-06-10 DIAGNOSIS — J44.9 CHRONIC OBSTRUCTIVE PULMONARY DISEASE, UNSPECIFIED COPD TYPE (HCC): ICD-10-CM

## 2019-06-10 RX ORDER — ALBUTEROL SULFATE 90 UG/1
2 AEROSOL, METERED RESPIRATORY (INHALATION) EVERY 4 HOURS PRN
Qty: 1 INHALER | Refills: 5 | Status: SHIPPED | OUTPATIENT
Start: 2019-06-10

## 2019-06-17 DIAGNOSIS — E03.8 HYPOTHYROIDISM DUE TO HASHIMOTO'S THYROIDITIS: ICD-10-CM

## 2019-06-17 DIAGNOSIS — E06.3 HYPOTHYROIDISM DUE TO HASHIMOTO'S THYROIDITIS: ICD-10-CM

## 2019-06-17 RX ORDER — LEVOTHYROXINE SODIUM 100 MCG
TABLET ORAL
Qty: 26 TABLET | Refills: 3 | Status: SHIPPED | OUTPATIENT
Start: 2019-06-17 | End: 2019-07-16

## 2019-06-19 DIAGNOSIS — E78.5 DYSLIPIDEMIA: ICD-10-CM

## 2019-06-19 DIAGNOSIS — R53.82 CHRONIC FATIGUE: ICD-10-CM

## 2019-06-19 DIAGNOSIS — E03.9 ACQUIRED HYPOTHYROIDISM: ICD-10-CM

## 2019-06-19 DIAGNOSIS — E55.9 VITAMIN D DEFICIENCY: Primary | ICD-10-CM

## 2019-07-02 ENCOUNTER — LAB (OUTPATIENT)
Dept: ENDOCRINOLOGY | Age: 59
End: 2019-07-02

## 2019-07-02 ENCOUNTER — RESULTS ENCOUNTER (OUTPATIENT)
Dept: ENDOCRINOLOGY | Age: 59
End: 2019-07-02

## 2019-07-02 DIAGNOSIS — E03.9 ACQUIRED HYPOTHYROIDISM: ICD-10-CM

## 2019-07-02 DIAGNOSIS — R53.82 CHRONIC FATIGUE: ICD-10-CM

## 2019-07-02 DIAGNOSIS — E55.9 VITAMIN D DEFICIENCY: ICD-10-CM

## 2019-07-02 DIAGNOSIS — E78.5 DYSLIPIDEMIA: ICD-10-CM

## 2019-07-02 DIAGNOSIS — E06.3 HYPOTHYROIDISM DUE TO HASHIMOTO'S THYROIDITIS: ICD-10-CM

## 2019-07-02 DIAGNOSIS — E03.8 HYPOTHYROIDISM DUE TO HASHIMOTO'S THYROIDITIS: ICD-10-CM

## 2019-07-09 LAB
25(OH)D3+25(OH)D2 SERPL-MCNC: 43.9 NG/ML (ref 30–100)
ALBUMIN SERPL-MCNC: 4.7 G/DL (ref 3.5–5.2)
ALBUMIN/GLOB SERPL: 2.8 G/DL
ALP SERPL-CCNC: 89 U/L (ref 39–117)
ALT SERPL-CCNC: 12 U/L (ref 1–41)
AST SERPL-CCNC: 18 U/L (ref 1–40)
BILIRUB SERPL-MCNC: 0.6 MG/DL (ref 0.2–1.2)
BUN SERPL-MCNC: 11 MG/DL (ref 6–20)
BUN/CREAT SERPL: 10.1 (ref 7–25)
CALCIUM SERPL-MCNC: 8.8 MG/DL (ref 8.6–10.5)
CHLORIDE SERPL-SCNC: 103 MMOL/L (ref 98–107)
CHOLEST SERPL-MCNC: 215 MG/DL (ref 0–200)
CO2 SERPL-SCNC: 27 MMOL/L (ref 22–29)
CREAT SERPL-MCNC: 1.09 MG/DL (ref 0.76–1.27)
FSH SERPL-ACNC: 6.3 MIU/ML (ref 1.5–12.4)
GLOBULIN SER CALC-MCNC: 1.7 GM/DL
GLUCOSE SERPL-MCNC: 99 MG/DL (ref 65–99)
HCT VFR BLD AUTO: 45.9 % (ref 37.5–51)
HDLC SERPL-MCNC: 53 MG/DL (ref 40–60)
HGB BLD-MCNC: 14.7 G/DL (ref 13–17.7)
INTERPRETATION: NORMAL
LDLC SERPL CALC-MCNC: 143 MG/DL (ref 0–100)
LH SERPL-ACNC: 10.6 MIU/ML (ref 1.7–8.6)
POTASSIUM SERPL-SCNC: 4.6 MMOL/L (ref 3.5–5.2)
PROT SERPL-MCNC: 6.4 G/DL (ref 6–8.5)
SHBG SERPL-SCNC: 114.6 NMOL/L (ref 19.3–76.4)
SODIUM SERPL-SCNC: 142 MMOL/L (ref 136–145)
T3FREE SERPL-MCNC: 2.8 PG/ML (ref 2–4.4)
T4 FREE SERPL-MCNC: 1.45 NG/DL (ref 0.93–1.7)
T4 SERPL-MCNC: 7.45 MCG/DL (ref 4.5–11.7)
TESTOST FREE SERPL-MCNC: 8.5 PG/ML (ref 7.2–24)
TESTOST SERPL-MCNC: 738 NG/DL (ref 264–916)
THYROGLOB AB SERPL-ACNC: 1.5 IU/ML
THYROGLOB SERPL-MCNC: 4.1 NG/ML
THYROGLOB SERPL-MCNC: ABNORMAL NG/ML
TRIGL SERPL-MCNC: 96 MG/DL (ref 0–150)
TSH SERPL DL<=0.005 MIU/L-ACNC: 7.14 MIU/ML (ref 0.27–4.2)
URATE SERPL-MCNC: 4.4 MG/DL (ref 3.4–7)
VLDLC SERPL CALC-MCNC: 19.2 MG/DL

## 2019-07-16 ENCOUNTER — OFFICE VISIT (OUTPATIENT)
Dept: ENDOCRINOLOGY | Age: 59
End: 2019-07-16

## 2019-07-16 VITALS
HEIGHT: 69 IN | WEIGHT: 164.8 LBS | SYSTOLIC BLOOD PRESSURE: 138 MMHG | BODY MASS INDEX: 24.41 KG/M2 | RESPIRATION RATE: 16 BRPM | DIASTOLIC BLOOD PRESSURE: 76 MMHG

## 2019-07-16 DIAGNOSIS — R53.82 CHRONIC FATIGUE: ICD-10-CM

## 2019-07-16 DIAGNOSIS — E78.5 DYSLIPIDEMIA: ICD-10-CM

## 2019-07-16 DIAGNOSIS — E55.9 VITAMIN D DEFICIENCY: ICD-10-CM

## 2019-07-16 DIAGNOSIS — E03.9 ACQUIRED HYPOTHYROIDISM: Primary | ICD-10-CM

## 2019-07-16 PROCEDURE — 99214 OFFICE O/P EST MOD 30 MIN: CPT | Performed by: INTERNAL MEDICINE

## 2019-07-16 RX ORDER — LEVOTHYROXINE SODIUM 150 MCG
150 TABLET ORAL DAILY
Qty: 30 TABLET | Refills: 11 | Status: SHIPPED | OUTPATIENT
Start: 2019-07-16 | End: 2019-11-18

## 2019-07-16 RX ORDER — ERGOCALCIFEROL 1.25 MG/1
50000 CAPSULE ORAL
Qty: 13 CAPSULE | Refills: 3 | Status: SHIPPED | OUTPATIENT
Start: 2019-07-16 | End: 2020-03-23 | Stop reason: SDUPTHER

## 2019-07-16 RX ORDER — PITAVASTATIN CALCIUM 4.18 MG/1
TABLET, FILM COATED ORAL
Qty: 30 TABLET | Refills: 11 | Status: SHIPPED | OUTPATIENT
Start: 2019-07-16 | End: 2020-03-23 | Stop reason: SDUPTHER

## 2019-07-16 NOTE — PROGRESS NOTES
"Subjective   Nguyễn Santana is a 58 y.o. male seen for follow up for hypothyroidism, lab review. He states that he is still getting tired. After taking Synthroid he is feeling hot until he eats and then he feels better. He felt better taking 88 MCG but feels \"groggy\" and like he is taking too much with the 100 MCG.     History of Present Illness this is a 58-year-old gentleman known patient with hypothyroidism and vitamin D deficiency and chronic fatigue with dyslipidemia.  Over the course of last 6 months he has had no significant health problem for which to go to the emergency room or hospital.    /76   Resp 16   Ht 175.3 cm (69\")   Wt 74.8 kg (164 lb 12.8 oz)   BMI 24.34 kg/m²      Allergies   Allergen Reactions   • Levothyroxine      Patient cannot take the generic. He can only take the brand name.       Current Outpatient Medications:   •  albuterol (PROVENTIL) (2.5 MG/3ML) 0.083% nebulizer solution, Take 2.5 mg by nebulization 4 (Four) Times a Day. Patient will call when needed, Disp: 125 vial, Rfl: 6  •  albuterol (PROVENTIL) 2 MG tablet, Take 1 tablet by mouth 4 (Four) Times a Day., Disp: 120 tablet, Rfl: 6  •  albuterol sulfate  (90 Base) MCG/ACT inhaler, Inhale 2 puffs Every 4 (Four) Hours As Needed for Wheezing., Disp: 1 inhaler, Rfl: 5  •  Fluticasone Furoate-Vilanterol (BREO ELLIPTA) 200-25 MCG/INH aerosol powder , Inhale 1 puff Daily. Patient will call when needed, Disp: 28 each, Rfl: 6  •  LIVALO 4 MG tablet, Take 1 tablet by mouth daily. Patient will call when needed, Disp: 30 tablet, Rfl: 6  •  montelukast (SINGULAIR) 10 MG tablet, Take 1 tablet by mouth Daily. Patient will call when needed, Disp: 30 tablet, Rfl: 6  •  O2 (OXYGEN), Inhale 4 L/min 1 (One) Time. 4 LPM HS only, Disp: , Rfl:   •  predniSONE (DELTASONE) 10 MG tablet, Take 10 mg by mouth Daily., Disp: , Rfl:   •  Respiratory Therapy Supplies (NEBULIZER) device, , Disp: , Rfl:   •  SPIRIVA RESPIMAT 2.5 MCG/ACT aerosol " solution inhaler, , Disp: , Rfl:   •  SYNTHROID 100 MCG tablet, Take 1 tab daily x5 days and 1/2 tab 2 days. (Patient taking differently: 100 mcg.), Disp: 26 tablet, Rfl: 3  •  vitamin D (ERGOCALCIFEROL) 38215 units capsule capsule, Take 1 capsule by mouth Every 7 (Seven) Days. Patient will call when needed, Disp: 13 capsule, Rfl: 3      The following portions of the patient's history were reviewed and updated as appropriate: allergies, current medications, past family history, past medical history, past social history, past surgical history and problem list.    Review of Systems   Constitutional: Negative.    HENT: Negative.    Eyes: Negative.    Respiratory: Negative.    Cardiovascular: Negative.    Gastrointestinal: Negative.    Endocrine: Negative.    Genitourinary: Negative.    Musculoskeletal: Negative.    Skin: Negative.    Allergic/Immunologic: Negative.    Neurological: Negative.    Hematological: Negative.    Psychiatric/Behavioral: Negative.        Objective   Physical Exam   Constitutional: He is oriented to person, place, and time. He appears well-developed and well-nourished. No distress.   Patient is on nasal O2.   HENT:   Head: Normocephalic and atraumatic.   Right Ear: External ear normal.   Left Ear: External ear normal.   Nose: Nose normal.   Mouth/Throat: Oropharynx is clear and moist. No oropharyngeal exudate.   Eyes: Conjunctivae and EOM are normal. Pupils are equal, round, and reactive to light. Right eye exhibits no discharge. Left eye exhibits no discharge. No scleral icterus.   Neck: Normal range of motion. Neck supple. No JVD present. No tracheal deviation present. Thyromegaly present.   Somewhat a nonhomogeneous enlargement of his thyroid to about 1-1/2 to twice normal size.   Cardiovascular: Normal rate, regular rhythm, normal heart sounds and intact distal pulses. Exam reveals no gallop and no friction rub.   No murmur heard.  Pulmonary/Chest: Effort normal and breath sounds normal. No  stridor. No respiratory distress. He has no wheezes. He has no rales. He exhibits no tenderness.   Abdominal: Soft. Bowel sounds are normal. He exhibits no distension and no mass. There is no tenderness. There is no rebound and no guarding. No hernia.   Musculoskeletal: Normal range of motion. He exhibits no tenderness or deformity.   Lymphadenopathy:     He has no cervical adenopathy.   Neurological: He is alert and oriented to person, place, and time. He has normal reflexes. He displays normal reflexes. No cranial nerve deficit or sensory deficit. He exhibits normal muscle tone. Coordination normal.   Skin: Skin is warm and dry. No rash noted. He is not diaphoretic. No erythema. No pallor.   Psychiatric: He has a normal mood and affect. His behavior is normal. Judgment and thought content normal.   Nursing note and vitals reviewed.       Results for orders placed or performed in visit on 07/02/19   Hemoglobin & Hematocrit, Blood   Result Value Ref Range    Hemoglobin 14.7 13.0 - 17.7 g/dL    Hematocrit 45.9 37.5 - 51.0 %   Luteinizing Hormone   Result Value Ref Range    LH 10.6 (H) 1.7 - 8.6 mIU/mL   Follicle Stimulating Hormone   Result Value Ref Range    FSH 6.3 1.5 - 12.4 mIU/mL   TestT+TestF+SHBG   Result Value Ref Range    Testosterone, Total 738 264 - 916 ng/dL    Testosterone, Free 8.5 7.2 - 24.0 pg/mL    Sex Hormone Binding Globulin 114.6 (H) 19.3 - 76.4 nmol/L   Lipid Panel   Result Value Ref Range    Total Cholesterol 215 (H) 0 - 200 mg/dL    Triglycerides 96 0 - 150 mg/dL    HDL Cholesterol 53 40 - 60 mg/dL    VLDL Cholesterol 19.2 mg/dL    LDL Cholesterol  143 (H) 0 - 100 mg/dL   Vitamin D 25 Hydroxy   Result Value Ref Range    25 Hydroxy, Vitamin D 43.9 30.0 - 100.0 ng/ml   Uric Acid   Result Value Ref Range    Uric Acid 4.4 3.4 - 7.0 mg/dL   T4, Free   Result Value Ref Range    Free T4 1.45 0.93 - 1.70 ng/dL   T4 & TSH ([facility])   Result Value Ref Range    TSH 7.140 (H) 0.270 - 4.200 mIU/mL    T4,  Total 7.45 4.50 - 11.70 mcg/dL   T3, Free   Result Value Ref Range    T3, Free 2.8 2.0 - 4.4 pg/mL   Comprehensive Thyroglobulin   Result Value Ref Range    Thyroglobulin Ab 1.5 (H) IU/mL    Thyroglobulin Comment ng/mL    Thyroglobulin (TG-ELIGIO) 4.1 ng/mL   Comprehensive Metabolic Panel   Result Value Ref Range    Glucose 99 65 - 99 mg/dL    BUN 11 6 - 20 mg/dL    Creatinine 1.09 0.76 - 1.27 mg/dL    eGFR Non African Am 70 >60 mL/min/1.73    eGFR African Am 85 >60 mL/min/1.73    BUN/Creatinine Ratio 10.1 7.0 - 25.0    Sodium 142 136 - 145 mmol/L    Potassium 4.6 3.5 - 5.2 mmol/L    Chloride 103 98 - 107 mmol/L    Total CO2 27.0 22.0 - 29.0 mmol/L    Calcium 8.8 8.6 - 10.5 mg/dL    Total Protein 6.4 6.0 - 8.5 g/dL    Albumin 4.70 3.50 - 5.20 g/dL    Globulin 1.7 gm/dL    A/G Ratio 2.8 g/dL    Total Bilirubin 0.6 0.2 - 1.2 mg/dL    Alkaline Phosphatase 89 39 - 117 U/L    AST (SGOT) 18 1 - 40 U/L    ALT (SGPT) 12 1 - 41 U/L   Cardiovascular Risk Assessment   Result Value Ref Range    Interpretation Note          Assessment/Plan   Diagnoses and all orders for this visit:    Acquired hypothyroidism  -     T4 & TSH ([facility]); Future  -     T3, Free; Future  -     T4, Free; Future  -     Thyroglobulin With Anti-TG; Future  -     Uric Acid; Future  -     Vitamin D 25 Hydroxy; Future  -     Comprehensive Metabolic Panel; Future  -     Lipid Panel; Future  -     ACTH; Future  -     Cortisol; Future    Vitamin D deficiency  -     vitamin D (ERGOCALCIFEROL) 58994 units capsule capsule; Take 1 capsule by mouth Every 7 (Seven) Days. Patient will call when needed  -     T4 & TSH ([facility]); Future  -     T3, Free; Future  -     T4, Free; Future  -     Thyroglobulin With Anti-TG; Future  -     Uric Acid; Future  -     Vitamin D 25 Hydroxy; Future  -     Comprehensive Metabolic Panel; Future  -     Lipid Panel; Future  -     ACTH; Future  -     Cortisol; Future    Dyslipidemia  -     T4 & TSH ([facility]); Future  -     T3, Free;  Future  -     T4, Free; Future  -     Thyroglobulin With Anti-TG; Future  -     Uric Acid; Future  -     Vitamin D 25 Hydroxy; Future  -     Comprehensive Metabolic Panel; Future  -     Lipid Panel; Future  -     ACTH; Future  -     Cortisol; Future    Chronic fatigue  -     T4 & TSH ([facility]); Future  -     T3, Free; Future  -     T4, Free; Future  -     Thyroglobulin With Anti-TG; Future  -     Uric Acid; Future  -     Vitamin D 25 Hydroxy; Future  -     Comprehensive Metabolic Panel; Future  -     Lipid Panel; Future  -     ACTH; Future  -     Cortisol; Future    Other orders  -     LIVALO 4 MG tablet; Take 1 tablet by mouth daily. Patient will call when needed  -     SYNTHROID 150 MCG tablet; Take 1 tablet by mouth Daily.      In summary I saw and examined this 58-year-old gentleman for above-mentioned problems.  I reviewed his laboratory evaluation of 7/2/2019 and provided him with a hard copy of it.  Aside from elevated TSH he is otherwise clinically and metabolically stable.  I will therefore raise the dose of Synthroid to 150 mcg daily.  He will see Ms. Dahlia Espino in 4 months or sooner if needed with laboratory evaluation prior to each office visit.

## 2019-10-25 ENCOUNTER — OFFICE VISIT (OUTPATIENT)
Dept: FAMILY MEDICINE CLINIC | Facility: CLINIC | Age: 59
End: 2019-10-25

## 2019-10-25 DIAGNOSIS — E78.5 DYSLIPIDEMIA: ICD-10-CM

## 2019-10-25 DIAGNOSIS — G47.30 SLEEP APNEA, UNSPECIFIED TYPE: ICD-10-CM

## 2019-10-25 DIAGNOSIS — E55.9 VITAMIN D DEFICIENCY: ICD-10-CM

## 2019-10-25 DIAGNOSIS — E07.89 OTHER SPECIFIED DISORDERS OF THYROID: ICD-10-CM

## 2019-10-25 DIAGNOSIS — J44.9 CHRONIC OBSTRUCTIVE PULMONARY DISEASE, UNSPECIFIED COPD TYPE (HCC): ICD-10-CM

## 2019-10-25 DIAGNOSIS — Z00.00 MEDICARE ANNUAL WELLNESS VISIT, SUBSEQUENT: Primary | ICD-10-CM

## 2019-10-25 DIAGNOSIS — Z12.5 ENCOUNTER FOR PROSTATE CANCER SCREENING: ICD-10-CM

## 2019-10-25 DIAGNOSIS — E03.9 ACQUIRED HYPOTHYROIDISM: ICD-10-CM

## 2019-10-25 DIAGNOSIS — R23.3 EASY BRUISING: ICD-10-CM

## 2019-10-25 DIAGNOSIS — Z99.81 OXYGEN DEPENDENT: ICD-10-CM

## 2019-10-25 LAB
BILIRUB BLD-MCNC: NEGATIVE MG/DL
CLARITY, POC: CLEAR
COLOR UR: YELLOW
GLUCOSE UR STRIP-MCNC: NEGATIVE MG/DL
KETONES UR QL: NEGATIVE
LEUKOCYTE EST, POC: NEGATIVE
NITRITE UR-MCNC: NEGATIVE MG/ML
PH UR: 5.5 [PH] (ref 5–8)
PROT UR STRIP-MCNC: NEGATIVE MG/DL
RBC # UR STRIP: NEGATIVE /UL
SP GR UR: 1.02 (ref 1–1.03)
UROBILINOGEN UR QL: NORMAL

## 2019-10-25 PROCEDURE — G0439 PPPS, SUBSEQ VISIT: HCPCS | Performed by: PHYSICIAN ASSISTANT

## 2019-10-25 PROCEDURE — 81003 URINALYSIS AUTO W/O SCOPE: CPT | Performed by: PHYSICIAN ASSISTANT

## 2019-10-25 NOTE — PROGRESS NOTES
Subjective   Nguyễn Santana is a 59 y.o. male here today for follow up of hyperlipidemia, vitamin D deficiency, hypothyroidism, COPD, sleep apnea, and specialists. He has also noted he is bruising easily     History of Present Illness     Endocrinology- [facility]/ Dr West- last appt 12/2018- treating for hypothyroidism, lipid, and vitamin d. Last labs 3/2019- follow up labs 5/20/19 and 7/2019 and follow up with Dr West 7/2019  Pulmonology- Dr Colon- last appt 1/2019 then 4/2019- he restarted steroid and advised not to stop it.  They have discussed lung transplant consultation, however, patient does not want to have lung transplant and will not see the specialist at this time.  Follow up 8/2019    Seen a couple weeks ago. Reports with Spiriva, he has increased mucus. Also was using Advair. Pulmonology stopped Advair and Spiriva and started Incruse and another inhaler. It was too expensive and they had to send in another nebulizer medication. He will add to albuterol. They have ordered but he cannot remember the name. He also did lung volume has decreased again with recent PFT. Overnight oximetry without oxygen then just had to have overnight oximetry with oxygen. They will call with results.     When takes Synthroid, gets very hot within 30 minutes of taking the dose. States he cannot wait to eat due to feeling poorly. He eats and the overheated feeling tapers off. This occurs with any dose > 100 mcg. Labs 7/2019, they had to increase to 150 mcg due to under medicated thyroid. He will follow up 11/18/19.     Over the past couple months, he has noticed increased bruising with little impact. States he has had no change in medications- has been on Prednisone for a long time. No other bleeding, blood in stool or urine.     The following portions of the patient's history were reviewed and updated as appropriate: allergies, current medications, past family history, past medical history, past social  history, past surgical history and problem list.    Review of Systems   Respiratory: Positive for shortness of breath.    Endocrine:        Feeling hot after taking synthroid   Hematological: Bruises/bleeds easily.   All other systems reviewed and are negative.      Objective   Physical Exam   Constitutional: He is oriented to person, place, and time. He appears well-developed and well-nourished. No distress.   HENT:   Head: Normocephalic and atraumatic.   Right Ear: Hearing, tympanic membrane, external ear and ear canal normal.   Left Ear: Hearing, tympanic membrane, external ear and ear canal normal.   Nose: Nose normal.   Mouth/Throat: Oropharynx is clear and moist.   Eyes: Conjunctivae, EOM and lids are normal. Pupils are equal, round, and reactive to light.   Neck: Neck supple. No JVD present. Carotid bruit is not present. No tracheal deviation present. No thyroid mass and no thyromegaly present.   Cardiovascular: Normal rate, regular rhythm, normal heart sounds and intact distal pulses. Exam reveals no gallop and no friction rub.   No murmur heard.  Pulses:       Radial pulses are 2+ on the right side, and 2+ on the left side.        Posterior tibial pulses are 2+ on the right side, and 2+ on the left side.   Pulmonary/Chest: Effort normal and breath sounds normal. No respiratory distress. He has no wheezes. He has no rhonchi. He has no rales.   Abdominal: Soft. Normal aorta and bowel sounds are normal. He exhibits no distension and no abdominal bruit. There is no hepatosplenomegaly. There is no tenderness. There is no rigidity, no rebound and no guarding. No hernia.   Musculoskeletal: Normal range of motion. He exhibits no edema, tenderness or deformity.   Normal strength.   Lymphadenopathy:     He has no cervical adenopathy.   Neurological: He is alert and oriented to person, place, and time. He has normal strength and normal reflexes. He displays normal reflexes. No cranial nerve deficit or sensory deficit.  He exhibits normal muscle tone. Coordination and gait normal.   Skin: Skin is warm and dry. No rash noted. He is not diaphoretic. No erythema.   Multiple small bruises   Psychiatric: He has a normal mood and affect. His speech is normal and behavior is normal. Judgment and thought content normal. Cognition and memory are normal.       Assessment/Plan   Diagnoses and all orders for this visit:    Medicare annual wellness visit, subsequent    Encounter for prostate cancer screening  -     POC Urinalysis Dipstick, Automated  -     PSA Screen    Acquired hypothyroidism    Other specified disorders of thyroid   -     Von Willebrand Disease Profile    Dyslipidemia    Vitamin D deficiency    Chronic obstructive pulmonary disease, unspecified COPD type (CMS/HCC)    Oxygen dependent    Sleep apnea, unspecified type    Easy bruising  -     CBC & Differential  -     PT & PTT ([facility])  -     Von Willebrand Disease Profile  -     POC Urinalysis Dipstick, Automated    Other orders  -     PSA Screen      Patient Instructions   59-year-old male who presents today for AWV and in follow-up of chronic medical problems and specialists. AWV completed today. He appears to be 4 years overdue for colonoscopy (performed 5/2012 with polypectomy and recheck in 3 years). It does not appear he has had a DEXA scan. With severe lung disease and chronic steroid use, he will need to have DEXA scan. We also need the dates of his last TDAP, Prevnar, Pneumovax, and Flu shot. We need his history of varicella and if he has had, I would recommend Shingrix as well.     Patient has COPD, sleep apnea, hypoxia and is oxygen dependent and follows with Dr. Colon.  He was last seen in August then a few weeks ago and he changed his inhaler and added nebulizer medication.  He manages patient's inhalers, nebulizer medications, prednisone, and oxygen. He was seen a couple weeks ago, and reports with Spiriva, he has increased mucus. He was also was using  Advair. Pulmonology stopped Advair and Spiriva and started Incruse and another medication for his nebulizer. It was too expensive and they had to send in another nebulizer medication. He will add it to albuterol. They have ordered medication, but he cannot remember the name. Pulmonologist also did lung volumes, reports they have decreased again with recent PFT. They ordered overnight oximetry without oxygen then he underwent overnight oximetry with oxygen. They will call with results. He will keep follow up as directed by them.    He also sees Dr. West and [facility] for hypothyroidism, dyslipidemia, and vitamin D deficiency-they manage his Synthroid, vitamin D, and Livalo. Labs 7/2019, they had to increase to 150 mcg due to under medicated thyroid. When he takes Synthroid, he gets very hot within 30 minutes of taking the dose. Patient reports he cannot wait to eat due to feeling poorly. He eats and the overheated feeling tapers off. This occurs with any dose > 100 mcg. He will follow up 11/18/19.  Patient is up-to-date with appointments and will continue follow-up with specialists as directed by them.      Over the past couple months, he has noticed increased bruising with little impact. He has had no change in medications- has been on Prednisone for a long time. No other bleeding, blood in stool or urine. I will check labs today and make further recommendation pending results. To call or return if persistent increased bruising or if worsening, new or changing symptoms, or any bleeding.     He will follow with me in about 6 months.  We will see him 1-2 times yearly to ensure we are up-to-date on his conditions or as needed for acute illnesses.      Endocrinology- [facility]/ Dr West- last appt 7/2019- treating for hypothyroidism, lipid, and vitamin d. Last labs 7/2019- under medicated thyroid- increased synthroid and continue Livalo. Follow up in 4 months.   Pulmonology- Dr Colon- last  appt 8/2019 then 4/2019- he restarted steroid and advised not to stop it. also changed his inhaler and added nebulizer medication. They have discussed lung transplant consultation, however, patient does not want to have lung transplant and will not see the specialist at this time.

## 2019-10-25 NOTE — PROGRESS NOTES
The ABCs of the Annual Wellness Visit  Subsequent Medicare Wellness Visit    No chief complaint on file.      Subjective   History of Present Illness:  Nguyễn Santana is a 59 y.o. male who presents for a Subsequent Medicare Wellness Visit.    Last colonoscopy- 2012- polyp removal. Recheck in 3 years.   Last DEXA- never  Last TDAP- unknown  Last Prevnar- unknown  Last Pneumovax- unknown  Last flu shot-10/2018  Shingrix- has not had    HEALTH RISK ASSESSMENT    Recent Hospitalizations:  No hospitalization(s) within the last year.    Current Medical Providers:  Patient Care Team:  Vanesa Bernard PA as PCP - General (Family Medicine)  Rigoberto Colon MD as PCP - Claims Attributed  Nellie Hsu APRN as Nurse Practitioner (Oncology)  Lion West MD as Consulting Physician (Endocrinology)  Rigoberto Colon MD as Consulting Physician (Pulmonary Disease)    Smoking Status:  Social History     Tobacco Use   Smoking Status Former Smoker   • Packs/day: 1.00   • Types: Cigarettes   • Start date: 1974   • Last attempt to quit: 2015   • Years since quittin.3   Smokeless Tobacco Never Used   Tobacco Comment    Began smoking at age 12.  Smoked 1.5 ppd for 6 years and 1 ppd for 36 years for a 45 pack year history.  She says that she quit 3 years ago even though she reports still smoking 1 or 2 cigarettes per week.       Alcohol Consumption:  Social History     Substance and Sexual Activity   Alcohol Use Yes   • Alcohol/week: 1.2 oz   • Types: 2 Cans of beer per week    Comment: Drinks 1 or 2 beers  or whiskey weekly.       Depression Screen:   PHQ-2/PHQ-9 Depression Screening 10/25/2019   Little interest or pleasure in doing things 0   Feeling down, depressed, or hopeless 0   Trouble falling or staying asleep, or sleeping too much 3   Feeling tired or having little energy 3   Poor appetite or overeating 3   Feeling bad about yourself - or that you are a failure or have let yourself or your  family down 0   Trouble concentrating on things, such as reading the newspaper or watching television 0   Moving or speaking so slowly that other people could have noticed. Or the opposite - being so fidgety or restless that you have been moving around a lot more than usual 1   Thoughts that you would be better off dead, or of hurting yourself in some way 0   Total Score 10   If you checked off any problems, how difficult have these problems made it for you to do your work, take care of things at home, or get along with other people? Extremely dIfficult       Fall Risk Screen:  JASSIADI Fall Risk Assessment has not been completed.    Health Habits and Functional and Cognitive Screening:  Functional & Cognitive Status 10/25/2019   Do you have difficulty preparing food and eating? No   Do you have difficulty bathing yourself, getting dressed or grooming yourself? No   Do you have difficulty using the toilet? No   Do you have difficulty moving around from place to place? No   Do you have trouble with steps or getting out of a bed or a chair? No   Current Diet Well Balanced Diet   Dental Exam Up to date   Eye Exam Up to date   Exercise (times per week) 7 times per week   Current Exercise Activities Include Housecleaning   Do you need help using the phone?  No   Are you deaf or do you have serious difficulty hearing?  No   Do you need help with transportation? No   Do you need help shopping? No   Do you need help preparing meals?  No   Do you need help with housework?  Yes   Do you need help with laundry? Yes   Do you need help taking your medications? No   Do you need help managing money? No   Do you ever drive or ride in a car without wearing a seat belt? No   Have you felt unusual stress, anger or loneliness in the last month? No   Who do you live with? Spouse   If you need help, do you have trouble finding someone available to you? No   Have you been bothered in the last four weeks by sexual problems? No   Do you have  difficulty concentrating, remembering or making decisions? No         Does the patient have evidence of cognitive impairment? No    Asprin use counseling:not taking ASA and has easy bruising - would not recommend ASA until we determine bruising etiology    Age-appropriate Screening Schedule:  Refer to the list below for future screening recommendations based on patient's age, sex and/or medical conditions. Orders for these recommended tests are listed in the plan section. The patient has been provided with a written plan.    Health Maintenance   Topic Date Due   • TDAP/TD VACCINES (1 - Tdap) 09/29/1981   • ZOSTER VACCINE (1 of 2) 09/29/2012   • INFLUENZA VACCINE  08/01/2019   • COLONOSCOPY  05/26/2022          The following portions of the patient's history were reviewed and updated as appropriate: allergies, current medications, past family history, past medical history, past social history, past surgical history and problem list.    Outpatient Medications Prior to Visit   Medication Sig Dispense Refill   • ADVAIR DISKUS 250-50 MCG/DOSE DISKUS      • albuterol (PROVENTIL) (2.5 MG/3ML) 0.083% nebulizer solution Take 2.5 mg by nebulization 4 (Four) Times a Day. Patient will call when needed 125 vial 6   • albuterol (PROVENTIL) 2 MG tablet Take 1 tablet by mouth 4 (Four) Times a Day. 120 tablet 6   • albuterol sulfate  (90 Base) MCG/ACT inhaler Inhale 2 puffs Every 4 (Four) Hours As Needed for Wheezing. 1 inhaler 5   • INCRUSE ELLIPTA 62.5 MCG/INH aerosol powder       • LIVALO 4 MG tablet Take 1 tablet by mouth daily. Patient will call when needed 30 tablet 11   • montelukast (SINGULAIR) 10 MG tablet Take 1 tablet by mouth Daily. Patient will call when needed 30 tablet 6   • O2 (OXYGEN) Inhale 4 L/min 1 (One) Time. 4 LPM HS only     • predniSONE (DELTASONE) 10 MG tablet Take 10 mg by mouth Daily.     • Respiratory Therapy Supplies (NEBULIZER) device      • SYNTHROID 150 MCG tablet Take 1 tablet by mouth Daily.  30 tablet 11   • vitamin D (ERGOCALCIFEROL) 41282 units capsule capsule Take 1 capsule by mouth Every 7 (Seven) Days. Patient will call when needed 13 capsule 3     No facility-administered medications prior to visit.        Patient Active Problem List   Diagnosis   • Atopic rhinitis   • Mixed anxiety depressive disorder   • Chronic obstructive pulmonary disease (CMS/HCC)   • Dizziness   • Dyslipidemia   • Hypothyroidism   • Hypoxia   • Shortness of breath   • Sleep apnea   • Vitamin D deficiency   • Chronic fatigue   • Oxygen dependent   • Panic attack   • Muscle spasms of neck       Advanced Care Planning:  Patient does not have an advance directive - information provided to the patient today    Review of Systems    Compared to one year ago, the patient feels his physical health is the same.  Compared to one year ago, the patient feels his mental health is the same.    Reviewed chart for potential of high risk medication in the elderly: not applicable  Reviewed chart for potential of harmful drug interactions in the elderly:not applicable    Objective       There were no vitals filed for this visit.    There is no height or weight on file to calculate BMI.  Discussed the patient's BMI with him. The BMI is in the acceptable range.    Physical Exam          Assessment/Plan   Medicare Risks and Personalized Health Plan  CMS Preventative Services Quick Reference  Depression/Dysphoria  Inactivity/Sedentary  Lung Cancer Risk  Polypharmacy  Tobacco Use/Dependance (use dotphrase .tobaccocessation for documentation)    The above risks/problems have been discussed with the patient.  Pertinent information has been shared with the patient in the After Visit Summary.  Follow up plans and orders are seen below in the Assessment/Plan Section.    Diagnoses and all orders for this visit:    1. Medicare annual wellness visit, subsequent (Primary)    2. Encounter for prostate cancer screening  -     POC Urinalysis Dipstick,  Automated  -     PSA Screen    3. Acquired hypothyroidism    4. Other specified disorders of thyroid   -     Von Willebrand Disease Profile    5. Dyslipidemia    6. Vitamin D deficiency    7. Chronic obstructive pulmonary disease, unspecified COPD type (CMS/HCC)    8. Oxygen dependent    9. Sleep apnea, unspecified type    10. Easy bruising  -     CBC & Differential  -     PT & PTT ([facility])  -     Von Willebrand Disease Profile  -     POC Urinalysis Dipstick, Automated    Other orders  -     PSA Screen      Follow Up:  Return in about 6 months (around 4/25/2020) for specialists - thyroid, vitamin D, lipids and COPD.     An After Visit Summary and PPPS were given to the patient.

## 2019-10-26 LAB
APTT PPP: 29 SEC (ref 24–33)
BASOPHILS # BLD AUTO: 0.1 X10E3/UL (ref 0–0.2)
BASOPHILS NFR BLD AUTO: 1 %
EOSINOPHIL # BLD AUTO: 0.4 X10E3/UL (ref 0–0.4)
EOSINOPHIL NFR BLD AUTO: 3 %
ERYTHROCYTE [DISTWIDTH] IN BLOOD BY AUTOMATED COUNT: 13.5 % (ref 12.3–15.4)
HCT VFR BLD AUTO: 44.5 % (ref 37.5–51)
HGB BLD-MCNC: 15.5 G/DL (ref 13–17.7)
IMM GRANULOCYTES # BLD AUTO: 0.1 X10E3/UL (ref 0–0.1)
IMM GRANULOCYTES NFR BLD AUTO: 1 %
INR PPP: 1 (ref 0.8–1.2)
LYMPHOCYTES # BLD AUTO: 3.5 X10E3/UL (ref 0.7–3.1)
LYMPHOCYTES NFR BLD AUTO: 27 %
MCH RBC QN AUTO: 32 PG (ref 26.6–33)
MCHC RBC AUTO-ENTMCNC: 34.8 G/DL (ref 31.5–35.7)
MCV RBC AUTO: 92 FL (ref 79–97)
MONOCYTES # BLD AUTO: 1.4 X10E3/UL (ref 0.1–0.9)
MONOCYTES NFR BLD AUTO: 11 %
NEUTROPHILS # BLD AUTO: 7.7 X10E3/UL (ref 1.4–7)
NEUTROPHILS NFR BLD AUTO: 57 %
PLATELET # BLD AUTO: 263 X10E3/UL (ref 150–450)
PROTHROMBIN TIME: 10.1 SEC (ref 9.1–12)
PSA SERPL-MCNC: 2.1 NG/ML (ref 0–4)
RBC # BLD AUTO: 4.84 X10E6/UL (ref 4.14–5.8)
WBC # BLD AUTO: 13.1 X10E3/UL (ref 3.4–10.8)

## 2019-10-29 DIAGNOSIS — D72.828 OTHER ELEVATED WHITE BLOOD CELL (WBC) COUNT: Primary | ICD-10-CM

## 2019-10-29 RX ORDER — MONTELUKAST SODIUM 10 MG/1
10 TABLET ORAL DAILY
Qty: 30 TABLET | Refills: 3 | Status: SHIPPED | OUTPATIENT
Start: 2019-10-29

## 2019-10-30 NOTE — PATIENT INSTRUCTIONS
59-year-old male who presents today for AWV and in follow-up of chronic medical problems and specialists. AWV completed today. He appears to be 4 years overdue for colonoscopy (performed 5/2012 with polypectomy and recheck in 3 years). It does not appear he has had a DEXA scan. With severe lung disease and chronic steroid use, he will need to have DEXA scan. We also need the dates of his last TDAP, Prevnar, Pneumovax, and Flu shot. We need his history of varicella and if he has had, I would recommend Shingrix as well.     Patient has COPD, sleep apnea, hypoxia and is oxygen dependent and follows with Dr. Colon.  He was last seen in August then a few weeks ago and he changed his inhaler and added nebulizer medication.  He manages patient's inhalers, nebulizer medications, prednisone, and oxygen. He was seen a couple weeks ago, and reports with Spiriva, he has increased mucus. He was also was using Advair. Pulmonology stopped Advair and Spiriva and started Incruse and another medication for his nebulizer. It was too expensive and they had to send in another nebulizer medication. He will add it to albuterol. They have ordered medication, but he cannot remember the name. Pulmonologist also did lung volumes, reports they have decreased again with recent PFT. They ordered overnight oximetry without oxygen then he underwent overnight oximetry with oxygen. They will call with results. He will keep follow up as directed by them.    He also sees Dr. West and [facility] for hypothyroidism, dyslipidemia, and vitamin D deficiency-they manage his Synthroid, vitamin D, and Livalo. Labs 7/2019, they had to increase to 150 mcg due to under medicated thyroid. When he takes Synthroid, he gets very hot within 30 minutes of taking the dose. Patient reports he cannot wait to eat due to feeling poorly. He eats and the overheated feeling tapers off. This occurs with any dose > 100 mcg. He will follow up 11/18/19.  Patient  is up-to-date with appointments and will continue follow-up with specialists as directed by them.      Over the past couple months, he has noticed increased bruising with little impact. He has had no change in medications- has been on Prednisone for a long time. No other bleeding, blood in stool or urine. I will check labs today and make further recommendation pending results. To call or return if persistent increased bruising or if worsening, new or changing symptoms, or any bleeding.     He will follow with me in about 6 months.  We will see him 1-2 times yearly to ensure we are up-to-date on his conditions or as needed for acute illnesses.      Endocrinology- [facility]/ Dr West- last appt 7/2019- treating for hypothyroidism, lipid, and vitamin d. Last labs 7/2019- under medicated thyroid- increased synthroid and continue Livalo. Follow up in 4 months.   Pulmonology- Dr Colon- last appt 8/2019 then 4/2019- he restarted steroid and advised not to stop it. also changed his inhaler and added nebulizer medication. They have discussed lung transplant consultation, however, patient does not want to have lung transplant and will not see the specialist at this time.

## 2019-11-04 ENCOUNTER — RESULTS ENCOUNTER (OUTPATIENT)
Dept: ENDOCRINOLOGY | Age: 59
End: 2019-11-04

## 2019-11-04 DIAGNOSIS — E78.5 DYSLIPIDEMIA: ICD-10-CM

## 2019-11-04 DIAGNOSIS — R53.82 CHRONIC FATIGUE: ICD-10-CM

## 2019-11-04 DIAGNOSIS — E03.9 ACQUIRED HYPOTHYROIDISM: ICD-10-CM

## 2019-11-04 DIAGNOSIS — E55.9 VITAMIN D DEFICIENCY: ICD-10-CM

## 2019-11-05 LAB
T3FREE SERPL-MCNC: 2.8 PG/ML (ref 2–4.4)
T4 FREE SERPL-MCNC: 2.45 NG/DL (ref 0.93–1.7)
THYROGLOB AB SERPL-ACNC: 1 IU/ML (ref 0–0.9)
THYROPEROXIDASE AB SERPL-ACNC: 21 IU/ML (ref 0–34)
TSH SERPL DL<=0.005 MIU/L-ACNC: 0.06 UIU/ML (ref 0.27–4.2)

## 2019-11-18 ENCOUNTER — OFFICE VISIT (OUTPATIENT)
Dept: ENDOCRINOLOGY | Age: 59
End: 2019-11-18

## 2019-11-18 VITALS
DIASTOLIC BLOOD PRESSURE: 74 MMHG | BODY MASS INDEX: 25.36 KG/M2 | SYSTOLIC BLOOD PRESSURE: 130 MMHG | HEIGHT: 69 IN | WEIGHT: 171.2 LBS

## 2019-11-18 DIAGNOSIS — E03.9 ACQUIRED HYPOTHYROIDISM: Primary | ICD-10-CM

## 2019-11-18 PROCEDURE — 99214 OFFICE O/P EST MOD 30 MIN: CPT | Performed by: NURSE PRACTITIONER

## 2019-11-18 RX ORDER — LEVOTHYROXINE SODIUM 137 MCG
137 TABLET ORAL DAILY
Qty: 30 TABLET | Refills: 4 | Status: SHIPPED | OUTPATIENT
Start: 2019-11-18 | End: 2020-03-23 | Stop reason: SDUPTHER

## 2019-11-18 RX ORDER — LEVOTHYROXINE SODIUM 137 UG/1
137 TABLET ORAL DAILY
Qty: 30 TABLET | Refills: 4 | Status: SHIPPED | OUTPATIENT
Start: 2019-11-18 | End: 2019-11-18 | Stop reason: SDUPTHER

## 2019-11-18 RX ORDER — BUDESONIDE 0.5 MG/2ML
0.5 INHALANT ORAL
COMMUNITY
End: 2022-05-05

## 2019-11-18 NOTE — PROGRESS NOTES
Subjective    Nguyễn Santana is a 59 y.o. male. he is here to be seen for hypothyroidism.     hypothyroid      Hypothyroidism   This is a chronic problem. The current episode started more than 1 month ago. The problem occurs constantly. The problem has been waxing and waning. Associated symptoms include coughing and fatigue. Pertinent negatives include no myalgias or rash. Nothing aggravates the symptoms. Treatments tried: meds and labs. The treatment provided no relief.        Evaluation history:  TSH   Date Value Ref Range Status   11/04/2019 0.056 (L) 0.270 - 4.200 uIU/mL Final     Free T4   Date Value Ref Range Status   11/04/2019 2.45 (H) 0.93 - 1.70 ng/dL Final     T3, Free   Date Value Ref Range Status   11/04/2019 2.8 2.0 - 4.4 pg/mL Final       Current medications:  Current Outpatient Medications   Medication Sig Dispense Refill   • ADVAIR DISKUS 250-50 MCG/DOSE DISKUS      • albuterol (PROVENTIL) (2.5 MG/3ML) 0.083% nebulizer solution Take 2.5 mg by nebulization 4 (Four) Times a Day. Patient will call when needed 125 vial 6   • albuterol (PROVENTIL) 2 MG tablet Take 1 tablet by mouth 4 (Four) Times a Day. 120 tablet 6   • albuterol sulfate  (90 Base) MCG/ACT inhaler Inhale 2 puffs Every 4 (Four) Hours As Needed for Wheezing. 1 inhaler 5   • budesonide (PULMICORT) 0.5 MG/2ML nebulizer solution Take 0.5 mg by nebulization Daily.     • INCRUSE ELLIPTA 62.5 MCG/INH aerosol powder       • LIVALO 4 MG tablet Take 1 tablet by mouth daily. Patient will call when needed 30 tablet 11   • montelukast (SINGULAIR) 10 MG tablet Take 1 tablet by mouth Daily. Patient will call when needed 30 tablet 3   • O2 (OXYGEN) Inhale 4 L/min 1 (One) Time. 4 LPM HS only     • predniSONE (DELTASONE) 10 MG tablet Take 10 mg by mouth Daily.     • Respiratory Therapy Supplies (NEBULIZER) device      • vitamin D (ERGOCALCIFEROL) 08575 units capsule capsule Take 1 capsule by mouth Every 7 (Seven) Days. Patient will call when  needed 13 capsule 3   • SYNTHROID 137 MCG tablet Take 1 tablet by mouth Daily. 30 tablet 4     No current facility-administered medications for this visit.        The following portions of the patient's history were reviewed and updated as appropriate:   Past Medical History:   Diagnosis Date   • Allergic rhinitis    • Anxiety    • COPD (chronic obstructive pulmonary disease) (CMS/HCC)    • Depression    • Diastolic dysfunction    • Hypertension    • Hypothyroidism    • Hypoxia    • On home oxygen therapy    • Peripheral vascular disease (CMS/HCC)    • Pneumonia    • Pulmonary nodule    • Sleep apnea    • Syncope      Past Surgical History:   Procedure Laterality Date   • VASECTOMY       Family History   Problem Relation Age of Onset   • Anxiety disorder Mother    • Depression Mother    • Colon cancer Mother 73   • Uterine cancer Mother    • Cancer Mother         Colon.    • Emphysema Father    • Lung cancer Father    • Stroke Father    • Heart attack Maternal Grandmother    • Stroke Maternal Grandmother    • Aneurysm Maternal Grandfather    • Heart attack Maternal Grandfather    • Heart attack Paternal Grandmother    • Heart attack Paternal Grandfather        Allergies   Allergen Reactions   • Levothyroxine      Patient cannot take the generic. He can only take the brand name.     Social History     Socioeconomic History   • Marital status:      Spouse name: Not on file   • Number of children: Not on file   • Years of education: Not on file   • Highest education level: Not on file   Tobacco Use   • Smoking status: Former Smoker     Packs/day: 1.00     Types: Cigarettes     Start date: 1974     Last attempt to quit: 2015     Years since quittin.4   • Smokeless tobacco: Never Used   • Tobacco comment: Began smoking at age 12.  Smoked 1.5 ppd for 6 years and 1 ppd for 36 years for a 45 pack year history.  She says that she quit 3 years ago even though she reports still smoking 1 or 2 cigarettes per  "week.   Substance and Sexual Activity   • Alcohol use: Yes     Alcohol/week: 1.2 oz     Types: 2 Cans of beer per week     Comment: Drinks 1 or 2 beers  or whiskey weekly.   • Drug use: No   • Sexual activity: No     Birth control/protection: None       Review of Systems  Review of Systems   Constitutional: Positive for fatigue.        30 minutes after thyroid  Medication gets \"medicine head\" and hot feeling.   HENT: Negative for trouble swallowing.    Eyes: Positive for visual disturbance.   Respiratory: Positive for cough.    Cardiovascular: Negative for palpitations.        Heart palp   Gastrointestinal: Negative for constipation.   Endocrine: Positive for cold intolerance and heat intolerance.   Genitourinary: Negative for difficulty urinating.   Musculoskeletal: Negative for myalgias.        Jittery   Skin: Negative for rash.   Neurological: Negative for light-headedness.   Hematological: Bruises/bleeds easily.   Psychiatric/Behavioral: Positive for agitation. The patient is nervous/anxious.         Objective    /74   Ht 175.3 cm (69.02\")   Wt 77.7 kg (171 lb 3.2 oz)   BMI 25.27 kg/m²   Physical Exam   Constitutional: He is oriented to person, place, and time. He appears well-developed and well-nourished. No distress.   HENT:   Head: Normocephalic and atraumatic.   Eyes: EOM are normal. Pupils are equal, round, and reactive to light.   Neck: Normal range of motion. Neck supple.   Cardiovascular: Normal rate, regular rhythm, normal heart sounds and intact distal pulses.   No murmur heard.  Pulmonary/Chest: Effort normal and breath sounds normal.   Abdominal: Soft. Bowel sounds are normal.   Musculoskeletal: Normal range of motion.   Neurological: He is alert and oriented to person, place, and time.   Skin: Skin is warm and dry. Capillary refill takes 2 to 3 seconds. He is not diaphoretic. No pallor.   Psychiatric: He has a normal mood and affect. His behavior is normal. Judgment and thought content " normal.   Nursing note and vitals reviewed.      Lab Review  Lab Results   Component Value Date    TSH 0.056 (L) 11/04/2019     Lab Results   Component Value Date    FREET4 2.45 (H) 11/04/2019     Office Visit on 10/25/2019   Component Date Value Ref Range Status   • WBC 10/25/2019 13.1* 3.4 - 10.8 x10E3/uL Final   • RBC 10/25/2019 4.84  4.14 - 5.80 x10E6/uL Final   • Hemoglobin 10/25/2019 15.5  13.0 - 17.7 g/dL Final   • Hematocrit 10/25/2019 44.5  37.5 - 51.0 % Final   • MCV 10/25/2019 92  79 - 97 fL Final   • MCH 10/25/2019 32.0  26.6 - 33.0 pg Final   • MCHC 10/25/2019 34.8  31.5 - 35.7 g/dL Final   • RDW 10/25/2019 13.5  12.3 - 15.4 % Final   • Platelets 10/25/2019 263  150 - 450 x10E3/uL Final   • Neutrophil Rel % 10/25/2019 57  Not Estab. % Final   • Lymphocyte Rel % 10/25/2019 27  Not Estab. % Final   • Monocyte Rel % 10/25/2019 11  Not Estab. % Final   • Eosinophil Rel % 10/25/2019 3  Not Estab. % Final   • Basophil Rel % 10/25/2019 1  Not Estab. % Final   • Neutrophils Absolute 10/25/2019 7.7* 1.4 - 7.0 x10E3/uL Final   • Lymphocytes Absolute 10/25/2019 3.5* 0.7 - 3.1 x10E3/uL Final   • Monocytes Absolute 10/25/2019 1.4* 0.1 - 0.9 x10E3/uL Final   • Eosinophils Absolute 10/25/2019 0.4  0.0 - 0.4 x10E3/uL Final   • Basophils Absolute 10/25/2019 0.1  0.0 - 0.2 x10E3/uL Final   • Immature Granulocyte Rel % 10/25/2019 1  Not Estab. % Final   • Immature Grans Absolute 10/25/2019 0.1  0.0 - 0.1 x10E3/uL Final   • INR 10/25/2019 1.0  0.8 - 1.2 Final    Comment: Reference interval is for non-anticoagulated patients.  Suggested INR therapeutic range for Vitamin K  antagonist therapy:     Standard Dose (moderate intensity                    therapeutic range):       2.0 - 3.0     Higher intensity therapeutic range       2.5 - 3.5     • Protime 10/25/2019 10.1  9.1 - 12.0 sec Final   • aPTT 10/25/2019 29  24 - 33 sec Final    Comment: This test has not been validated for monitoring unfractionated heparin  therapy.  aPTT-based therapeutic ranges for unfractionated heparin  therapy have not been established. For general guidelines on  Heparin monitoring, refer to the [facility] Directory of Services.     • Color 10/25/2019 Yellow  Yellow, Straw, Dark Yellow, Olena Final   • Clarity, UA 10/25/2019 Clear  Clear Final   • Specific Gravity  10/25/2019 1.025  1.005 - 1.030 Final   • pH, Urine 10/25/2019 5.5  5.0 - 8.0 Final   • Leukocytes 10/25/2019 Negative  Negative Final   • Nitrite, UA 10/25/2019 Negative  Negative Final   • Protein, POC 10/25/2019 Negative  Negative mg/dL Final   • Glucose, UA 10/25/2019 Negative  Negative, 1000 mg/dL (3+) mg/dL Final   • Ketones, UA 10/25/2019 Negative  Negative Final   • Urobilinogen, UA 10/25/2019 Normal  Normal Final   • Bilirubin 10/25/2019 Negative  Negative Final   • Blood, UA 10/25/2019 Negative  Negative Final   • PSA 10/25/2019 2.1  0.0 - 4.0 ng/mL Final    Comment: Roche ECLIA methodology.  According to the American U[employer] Association, Serum PSA should  decrease and remain at undetectable levels after radical  prostatectomy. The AUA defines biochemical recurrence as an initial  PSA value 0.2 ng/mL or greater followed by a subsequent confirmatory  PSA value 0.2 ng/mL or greater.  Values obtained with different assay methods or kits cannot be used  interchangeably. Results cannot be interpreted as absolute evidence  of the presence or absence of malignant disease.          Assessment/Plan      1. Acquired hypothyroidism    . This diagnosis was discussed and reviewed with the patient including the advantages of drug therapy.     1. Orders placed during this encounter include:  Orders Placed This Encounter   Procedures   • TSH     Standing Status:   Future   • Thyroid Antibodies     Standing Status:   Future   • T4, Free     Standing Status:   Future   • T3, Free     Standing Status:   Future       Medications prescribed:  Outpatient Encounter Medications as of 11/18/2019   Medication  Sig Dispense Refill   • ADVAIR DISKUS 250-50 MCG/DOSE DISKUS      • albuterol (PROVENTIL) (2.5 MG/3ML) 0.083% nebulizer solution Take 2.5 mg by nebulization 4 (Four) Times a Day. Patient will call when needed 125 vial 6   • albuterol (PROVENTIL) 2 MG tablet Take 1 tablet by mouth 4 (Four) Times a Day. 120 tablet 6   • albuterol sulfate  (90 Base) MCG/ACT inhaler Inhale 2 puffs Every 4 (Four) Hours As Needed for Wheezing. 1 inhaler 5   • budesonide (PULMICORT) 0.5 MG/2ML nebulizer solution Take 0.5 mg by nebulization Daily.     • INCRUSE ELLIPTA 62.5 MCG/INH aerosol powder       • LIVALO 4 MG tablet Take 1 tablet by mouth daily. Patient will call when needed 30 tablet 11   • montelukast (SINGULAIR) 10 MG tablet Take 1 tablet by mouth Daily. Patient will call when needed 30 tablet 3   • O2 (OXYGEN) Inhale 4 L/min 1 (One) Time. 4 LPM HS only     • predniSONE (DELTASONE) 10 MG tablet Take 10 mg by mouth Daily.     • Respiratory Therapy Supplies (NEBULIZER) device      • vitamin D (ERGOCALCIFEROL) 25024 units capsule capsule Take 1 capsule by mouth Every 7 (Seven) Days. Patient will call when needed 13 capsule 3   • [DISCONTINUED] SYNTHROID 150 MCG tablet Take 1 tablet by mouth Daily. 30 tablet 11   • SYNTHROID 137 MCG tablet Take 1 tablet by mouth Daily. 30 tablet 4   • [DISCONTINUED] levothyroxine (SYNTHROID) 137 MCG tablet Take 1 tablet by mouth Daily. 30 tablet 4     No facility-administered encounter medications on file as of 11/18/2019.        2. Repeat s-TSH in 6-8 weeks and before patient's next visit.    3. The risks and benefits of my recommendations, as well as other treatment options were discussed with the patient and wife today. Questions were answered.    Nguyễn was seen today for follow-up.    Diagnoses and all orders for this visit:    Acquired hypothyroidism  -     Discontinue: levothyroxine (SYNTHROID) 137 MCG tablet; Take 1 tablet by mouth Daily.  -     TSH; Future  -     Thyroid Antibodies;  Future  -     T4, Free; Future  -     T3, Free; Future  -     SYNTHROID 137 MCG tablet; Take 1 tablet by mouth Daily.        4. Return in about 3 months (around 2/18/2020), or if symptoms worsen or fail to improve, for Recheck. Labs in 6 weeks. 3 months with Dahlia-2 weeks prior for labs 6 months with Dr. West-2 weeks prior for labs

## 2019-11-25 LAB
BASOPHILS # BLD AUTO: 0.1 10*3/MM3 (ref 0–0.2)
BASOPHILS NFR BLD AUTO: 0.6 % (ref 0–1.5)
EOSINOPHIL # BLD AUTO: 0.22 10*3/MM3 (ref 0–0.4)
EOSINOPHIL NFR BLD AUTO: 1.4 % (ref 0.3–6.2)
ERYTHROCYTE [DISTWIDTH] IN BLOOD BY AUTOMATED COUNT: 12.9 % (ref 12.3–15.4)
HCT VFR BLD AUTO: 45 % (ref 37.5–51)
HGB BLD-MCNC: 15.3 G/DL (ref 13–17.7)
IMM GRANULOCYTES # BLD AUTO: 0.13 10*3/MM3 (ref 0–0.05)
IMM GRANULOCYTES NFR BLD AUTO: 0.8 % (ref 0–0.5)
LYMPHOCYTES # BLD AUTO: 3.32 10*3/MM3 (ref 0.7–3.1)
LYMPHOCYTES NFR BLD AUTO: 20.7 % (ref 19.6–45.3)
MCH RBC QN AUTO: 31.9 PG (ref 26.6–33)
MCHC RBC AUTO-ENTMCNC: 34 G/DL (ref 31.5–35.7)
MCV RBC AUTO: 93.9 FL (ref 79–97)
MONOCYTES # BLD AUTO: 1.47 10*3/MM3 (ref 0.1–0.9)
MONOCYTES NFR BLD AUTO: 9.2 % (ref 5–12)
NEUTROPHILS # BLD AUTO: 10.8 10*3/MM3 (ref 1.7–7)
NEUTROPHILS NFR BLD AUTO: 67.3 % (ref 42.7–76)
NRBC BLD AUTO-RTO: 0 /100 WBC (ref 0–0.2)
PLATELET # BLD AUTO: 308 10*3/MM3 (ref 140–450)
RBC # BLD AUTO: 4.79 10*6/MM3 (ref 4.14–5.8)
WBC # BLD AUTO: 16.04 10*3/MM3 (ref 3.4–10.8)

## 2019-12-30 ENCOUNTER — RESULTS ENCOUNTER (OUTPATIENT)
Dept: ENDOCRINOLOGY | Age: 59
End: 2019-12-30

## 2019-12-30 DIAGNOSIS — E03.9 ACQUIRED HYPOTHYROIDISM: ICD-10-CM

## 2020-01-06 LAB
T3FREE SERPL-MCNC: 2.4 PG/ML (ref 2–4.4)
T4 FREE SERPL-MCNC: 2.01 NG/DL (ref 0.93–1.7)
THYROGLOB AB SERPL-ACNC: 1.1 IU/ML (ref 0–0.9)
THYROPEROXIDASE AB SERPL-ACNC: 12 IU/ML (ref 0–34)
TSH SERPL DL<=0.005 MIU/L-ACNC: 1.36 UIU/ML (ref 0.27–4.2)

## 2020-03-12 LAB
25(OH)D3+25(OH)D2 SERPL-MCNC: 45.1 NG/ML (ref 30–100)
ACTH PLAS-MCNC: 3.4 PG/ML (ref 7.2–63.3)
ALBUMIN SERPL-MCNC: 4.7 G/DL (ref 3.8–4.9)
ALBUMIN/GLOB SERPL: 2 {RATIO} (ref 1.2–2.2)
ALP SERPL-CCNC: 78 IU/L (ref 39–117)
ALT SERPL-CCNC: 23 IU/L (ref 0–44)
AST SERPL-CCNC: 24 IU/L (ref 0–40)
BILIRUB SERPL-MCNC: 1 MG/DL (ref 0–1.2)
BUN SERPL-MCNC: 16 MG/DL (ref 6–24)
BUN/CREAT SERPL: 13 (ref 9–20)
CALCIUM SERPL-MCNC: 9.7 MG/DL (ref 8.7–10.2)
CHLORIDE SERPL-SCNC: 103 MMOL/L (ref 96–106)
CHOLEST SERPL-MCNC: 246 MG/DL (ref 100–199)
CO2 SERPL-SCNC: 21 MMOL/L (ref 20–29)
CORTIS SERPL-MCNC: 2.9 UG/DL
CREAT SERPL-MCNC: 1.28 MG/DL (ref 0.76–1.27)
GLOBULIN SER CALC-MCNC: 2.3 G/DL (ref 1.5–4.5)
GLUCOSE SERPL-MCNC: 106 MG/DL (ref 65–99)
HDLC SERPL-MCNC: 60 MG/DL
INTERPRETATION: NORMAL
LDLC SERPL CALC-MCNC: 163 MG/DL (ref 0–99)
POTASSIUM SERPL-SCNC: 4.7 MMOL/L (ref 3.5–5.2)
PROT SERPL-MCNC: 7 G/DL (ref 6–8.5)
SODIUM SERPL-SCNC: 141 MMOL/L (ref 134–144)
T3FREE SERPL-MCNC: 3 PG/ML (ref 2–4.4)
T4 FREE SERPL-MCNC: 2.08 NG/DL (ref 0.82–1.77)
T4 SERPL-MCNC: 11.5 UG/DL (ref 4.5–12)
THYROGLOB AB SERPL-ACNC: 1.2 IU/ML (ref 0–0.9)
THYROGLOB SERPL-MCNC: 2 NG/ML
TRIGL SERPL-MCNC: 114 MG/DL (ref 0–149)
TSH SERPL DL<=0.005 MIU/L-ACNC: 0.85 UIU/ML (ref 0.45–4.5)
URATE SERPL-MCNC: 4.9 MG/DL (ref 3.7–8.6)
VLDLC SERPL CALC-MCNC: 23 MG/DL (ref 5–40)

## 2020-03-23 ENCOUNTER — OFFICE VISIT (OUTPATIENT)
Dept: ENDOCRINOLOGY | Age: 60
End: 2020-03-23

## 2020-03-23 VITALS
DIASTOLIC BLOOD PRESSURE: 70 MMHG | BODY MASS INDEX: 25.09 KG/M2 | OXYGEN SATURATION: 95 % | WEIGHT: 169.4 LBS | HEIGHT: 69 IN | RESPIRATION RATE: 20 BRPM | SYSTOLIC BLOOD PRESSURE: 142 MMHG | HEART RATE: 75 BPM

## 2020-03-23 DIAGNOSIS — E55.9 VITAMIN D DEFICIENCY: ICD-10-CM

## 2020-03-23 DIAGNOSIS — R73.9 HYPERGLYCEMIA: ICD-10-CM

## 2020-03-23 DIAGNOSIS — E03.9 ACQUIRED HYPOTHYROIDISM: Primary | ICD-10-CM

## 2020-03-23 DIAGNOSIS — R53.82 CHRONIC FATIGUE: ICD-10-CM

## 2020-03-23 DIAGNOSIS — E78.5 DYSLIPIDEMIA: ICD-10-CM

## 2020-03-23 DIAGNOSIS — N40.0 BENIGN PROSTATIC HYPERPLASIA WITHOUT LOWER URINARY TRACT SYMPTOMS: ICD-10-CM

## 2020-03-23 PROCEDURE — 99214 OFFICE O/P EST MOD 30 MIN: CPT | Performed by: INTERNAL MEDICINE

## 2020-03-23 RX ORDER — LEVOTHYROXINE SODIUM 150 MCG
150 TABLET ORAL DAILY
Qty: 30 TABLET | Refills: 11 | Status: SHIPPED | OUTPATIENT
Start: 2020-03-23 | End: 2020-07-27 | Stop reason: SDUPTHER

## 2020-03-23 RX ORDER — LEVOTHYROXINE SODIUM 137 MCG
137 TABLET ORAL DAILY
Qty: 30 TABLET | Refills: 11 | Status: SHIPPED | OUTPATIENT
Start: 2020-03-23 | End: 2020-03-23

## 2020-03-23 RX ORDER — PITAVASTATIN CALCIUM 4.18 MG/1
TABLET, FILM COATED ORAL
Qty: 30 TABLET | Refills: 11 | Status: SHIPPED | OUTPATIENT
Start: 2020-03-23 | End: 2020-07-27

## 2020-03-23 RX ORDER — ERGOCALCIFEROL 1.25 MG/1
50000 CAPSULE ORAL
Qty: 13 CAPSULE | Refills: 3 | Status: SHIPPED | OUTPATIENT
Start: 2020-03-23 | End: 2020-07-27 | Stop reason: SDUPTHER

## 2020-03-23 NOTE — PROGRESS NOTES
"Subjective   Nguyễn Santana is a 59 y.o. male PT HERE FOR FUP ON HYPOTHYROID LAB REVIEW DISCUSS MEDS NO CURRENT ISSUES AT CURRENT TIME     History of Present Illness this is a 59-year-old gentleman known patient with hypothyroidism as well as dyslipidemia and chronic fatigue and vitamin D deficiency.  Over the course of last 6 months he has had no significant health problem for which to go to the emergency room or hospital.  He is also an oxygen dependent COPD patient is complaining of shortness of breath as well as cold and heat intolerance on different occasions.  He is also complains of nervousness and anxiety.  He also mentioned that he felt a whole lot better on Synthroid 150 mcg daily.    The following portions of the patient's history were reviewed and updated as appropriate: allergies, current medications, past family history, past medical history, past social history, past surgical history and problem list.    Review of Systems   Constitutional: Negative.    HENT: Negative.    Eyes: Negative.    Respiratory: Positive for shortness of breath.    Cardiovascular: Negative.    Gastrointestinal: Negative.    Endocrine: Positive for cold intolerance and heat intolerance.   Genitourinary: Negative.    Musculoskeletal: Negative.    Skin: Negative.    Allergic/Immunologic: Negative.    Neurological: Negative.    Hematological: Negative.    Psychiatric/Behavioral: The patient is nervous/anxious.    The above review of system was reviewed, corroborated and accepted.  /70   Pulse 75   Resp 20   Ht 175.3 cm (69\")   Wt 76.8 kg (169 lb 6.4 oz)   SpO2 95%   BMI 25.02 kg/m²     Allergies   Allergen Reactions   • Levothyroxine      Patient cannot take the generic. He can only take the brand name.         Current Outpatient Medications:   •  ADVAIR DISKUS 250-50 MCG/DOSE DISKUS, , Disp: , Rfl:   •  albuterol (PROVENTIL) (2.5 MG/3ML) 0.083% nebulizer solution, Take 2.5 mg by nebulization 4 (Four) Times a Day. " Patient will call when needed, Disp: 125 vial, Rfl: 6  •  albuterol (PROVENTIL) 2 MG tablet, Take 1 tablet by mouth 4 (Four) Times a Day., Disp: 120 tablet, Rfl: 6  •  albuterol sulfate  (90 Base) MCG/ACT inhaler, Inhale 2 puffs Every 4 (Four) Hours As Needed for Wheezing., Disp: 1 inhaler, Rfl: 5  •  budesonide (PULMICORT) 0.5 MG/2ML nebulizer solution, Take 0.5 mg by nebulization Daily., Disp: , Rfl:   •  INCRUSE ELLIPTA 62.5 MCG/INH aerosol powder , , Disp: , Rfl:   •  LIVALO 4 MG tablet, Take 1 tablet by mouth daily. Patient will call when needed, Disp: 30 tablet, Rfl: 11  •  montelukast (SINGULAIR) 10 MG tablet, Take 1 tablet by mouth Daily. Patient will call when needed, Disp: 30 tablet, Rfl: 3  •  O2 (OXYGEN), Inhale 4 L/min 1 (One) Time. 4 LPM HS only, Disp: , Rfl:   •  predniSONE (DELTASONE) 10 MG tablet, Take 10 mg by mouth Daily., Disp: , Rfl:   •  Respiratory Therapy Supplies (NEBULIZER) device, , Disp: , Rfl:   •  SYNTHROID 137 MCG tablet, Take 1 tablet by mouth Daily., Disp: 30 tablet, Rfl: 4  •  vitamin D (ERGOCALCIFEROL) 97820 units capsule capsule, Take 1 capsule by mouth Every 7 (Seven) Days. Patient will call when needed, Disp: 13 capsule, Rfl: 3  Objective   Physical Exam   Constitutional: He is oriented to person, place, and time. He appears well-developed and well-nourished. No distress.   Patient is on nasal O2.   HENT:   Head: Normocephalic and atraumatic.   Right Ear: External ear normal.   Left Ear: External ear normal.   Nose: Nose normal.   Mouth/Throat: Oropharynx is clear and moist. No oropharyngeal exudate.   Eyes: Pupils are equal, round, and reactive to light. Conjunctivae and EOM are normal. Right eye exhibits no discharge. Left eye exhibits no discharge. No scleral icterus.   Neck: Normal range of motion. Neck supple. No JVD present. No tracheal deviation present. Thyromegaly present.   Somewhat a nonhomogeneous enlargement of his thyroid to about 1-1/2 to twice normal size.    Cardiovascular: Normal rate, regular rhythm, normal heart sounds and intact distal pulses. Exam reveals no gallop and no friction rub.   No murmur heard.  Pulmonary/Chest: Effort normal and breath sounds normal. No stridor. No respiratory distress. He has no wheezes. He has no rales. He exhibits no tenderness.   Abdominal: Soft. Bowel sounds are normal. He exhibits no distension and no mass. There is no tenderness. There is no rebound and no guarding. No hernia.   Musculoskeletal: Normal range of motion. He exhibits no tenderness or deformity.   Lymphadenopathy:     He has no cervical adenopathy.   Neurological: He is alert and oriented to person, place, and time. He has normal reflexes. He displays normal reflexes. No cranial nerve deficit or sensory deficit. He exhibits normal muscle tone. Coordination normal.   Skin: Skin is warm and dry. No rash noted. He is not diaphoretic. No erythema. No pallor.   Psychiatric: He has a normal mood and affect. His behavior is normal. Judgment and thought content normal.   Nursing note and vitals reviewed.  No significant change since 7/16/2019 office visit.      Assessment/Plan   Nguyễn was seen today for hypothyroidism.    Diagnoses and all orders for this visit:    Acquired hypothyroidism  -     Discontinue: SYNTHROID 137 MCG tablet; Take 1 tablet by mouth Daily.  -     T4 & TSH ([facility]); Future  -     T3, Free; Future  -     T4, Free; Future  -     TestT+TestF+SHBG; Future  -     Uric Acid; Future  -     Vitamin D 25 Hydroxy; Future  -     Comprehensive Metabolic Panel; Future  -     C-Peptide; Future  -     Hemoglobin A1c; Future  -     Lipid Panel; Future  -     MicroAlbumin, Urine, Random - Urine, Clean Catch; Future  -     PSA DIAGNOSTIC; Future  -     Hemoglobin & Hematocrit, Blood; Future    Vitamin D deficiency  -     vitamin D (ERGOCALCIFEROL) 1.25 MG (55825 UT) capsule capsule; Take 1 capsule by mouth Every 7 (Seven) Days. Patient will call when needed  -      T4 & TSH ([facility]); Future  -     T3, Free; Future  -     T4, Free; Future  -     TestT+TestF+SHBG; Future  -     Uric Acid; Future  -     Vitamin D 25 Hydroxy; Future  -     Comprehensive Metabolic Panel; Future  -     C-Peptide; Future  -     Hemoglobin A1c; Future  -     Lipid Panel; Future  -     MicroAlbumin, Urine, Random - Urine, Clean Catch; Future  -     PSA DIAGNOSTIC; Future  -     Hemoglobin & Hematocrit, Blood; Future    Dyslipidemia  -     T4 & TSH ([facility]); Future  -     T3, Free; Future  -     T4, Free; Future  -     TestT+TestF+SHBG; Future  -     Uric Acid; Future  -     Vitamin D 25 Hydroxy; Future  -     Comprehensive Metabolic Panel; Future  -     C-Peptide; Future  -     Hemoglobin A1c; Future  -     Lipid Panel; Future  -     MicroAlbumin, Urine, Random - Urine, Clean Catch; Future  -     PSA DIAGNOSTIC; Future  -     Hemoglobin & Hematocrit, Blood; Future    Chronic fatigue  -     T4 & TSH ([facility]); Future  -     T3, Free; Future  -     T4, Free; Future  -     TestT+TestF+SHBG; Future  -     Uric Acid; Future  -     Vitamin D 25 Hydroxy; Future  -     Comprehensive Metabolic Panel; Future  -     C-Peptide; Future  -     Hemoglobin A1c; Future  -     Lipid Panel; Future  -     MicroAlbumin, Urine, Random - Urine, Clean Catch; Future  -     PSA DIAGNOSTIC; Future  -     Hemoglobin & Hematocrit, Blood; Future    Benign prostatic hyperplasia without lower urinary tract symptoms  -     T4 & TSH ([facility]); Future  -     T3, Free; Future  -     T4, Free; Future  -     TestT+TestF+SHBG; Future  -     Uric Acid; Future  -     Vitamin D 25 Hydroxy; Future  -     Comprehensive Metabolic Panel; Future  -     C-Peptide; Future  -     Hemoglobin A1c; Future  -     Lipid Panel; Future  -     MicroAlbumin, Urine, Random - Urine, Clean Catch; Future  -     PSA DIAGNOSTIC; Future  -     Hemoglobin & Hematocrit, Blood; Future    Hyperglycemia  -     T4 & TSH ([facility]); Future  -     T3, Free; Future  -      T4, Free; Future  -     TestT+TestF+SHBG; Future  -     Uric Acid; Future  -     Vitamin D 25 Hydroxy; Future  -     Comprehensive Metabolic Panel; Future  -     C-Peptide; Future  -     Hemoglobin A1c; Future  -     Lipid Panel; Future  -     MicroAlbumin, Urine, Random - Urine, Clean Catch; Future  -     PSA DIAGNOSTIC; Future  -     Hemoglobin & Hematocrit, Blood; Future    Other orders  -     LIVALO 4 MG tablet; Take 1 tablet by mouth daily. Patient will call when needed  -     SYNTHROID 150 MCG tablet; Take 1 tablet by mouth Daily.      In summary I saw and examined this 59-year-old gentleman for above-mentioned problems.  I reviewed his laboratory evaluations of 3/3/2020 and provided him with a hard copy of it.  Overall he is clinically and metabolically stable and therefore we will go ahead and continue all his current prescriptions.  I will see him in 6 months or sooner if needed with laboratory evaluation prior to each office visit.

## 2020-07-13 ENCOUNTER — LAB (OUTPATIENT)
Dept: ENDOCRINOLOGY | Age: 60
End: 2020-07-13

## 2020-07-13 DIAGNOSIS — E78.5 DYSLIPIDEMIA: ICD-10-CM

## 2020-07-13 DIAGNOSIS — R53.82 CHRONIC FATIGUE: ICD-10-CM

## 2020-07-13 DIAGNOSIS — N40.0 BENIGN PROSTATIC HYPERPLASIA WITHOUT LOWER URINARY TRACT SYMPTOMS: ICD-10-CM

## 2020-07-13 DIAGNOSIS — R73.9 HYPERGLYCEMIA: ICD-10-CM

## 2020-07-13 DIAGNOSIS — E03.9 ACQUIRED HYPOTHYROIDISM: ICD-10-CM

## 2020-07-13 DIAGNOSIS — E55.9 VITAMIN D DEFICIENCY: ICD-10-CM

## 2020-07-15 LAB
25(OH)D3+25(OH)D2 SERPL-MCNC: 41.2 NG/ML (ref 30–100)
ALBUMIN SERPL-MCNC: 4.5 G/DL (ref 3.5–5.2)
ALBUMIN/GLOB SERPL: 2.5 G/DL
ALP SERPL-CCNC: 59 U/L (ref 39–117)
ALT SERPL-CCNC: 18 U/L (ref 1–41)
AST SERPL-CCNC: 14 U/L (ref 1–40)
BILIRUB SERPL-MCNC: 0.6 MG/DL (ref 0–1.2)
BUN SERPL-MCNC: 17 MG/DL (ref 6–20)
BUN/CREAT SERPL: 16.3 (ref 7–25)
C PEPTIDE SERPL-MCNC: 3.1 NG/ML (ref 1.1–4.4)
CALCIUM SERPL-MCNC: 9.2 MG/DL (ref 8.6–10.5)
CHLORIDE SERPL-SCNC: 104 MMOL/L (ref 98–107)
CHOLEST SERPL-MCNC: 214 MG/DL (ref 0–200)
CO2 SERPL-SCNC: 26.1 MMOL/L (ref 22–29)
CREAT SERPL-MCNC: 1.04 MG/DL (ref 0.76–1.27)
GLOBULIN SER CALC-MCNC: 1.8 GM/DL
GLUCOSE SERPL-MCNC: 77 MG/DL (ref 65–99)
HBA1C MFR BLD: 5.5 % (ref 4.8–5.6)
HCT VFR BLD AUTO: 48.4 % (ref 37.5–51)
HDLC SERPL-MCNC: 61 MG/DL (ref 40–60)
HGB BLD-MCNC: 16.4 G/DL (ref 13–17.7)
INTERPRETATION: NORMAL
LDLC SERPL CALC-MCNC: 122 MG/DL (ref 0–100)
Lab: NORMAL
MICROALBUMIN UR-MCNC: 8.5 UG/ML
POTASSIUM SERPL-SCNC: 4.5 MMOL/L (ref 3.5–5.2)
PROT SERPL-MCNC: 6.3 G/DL (ref 6–8.5)
PSA SERPL-MCNC: 2.55 NG/ML (ref 0–4)
SHBG SERPL-SCNC: 96.4 NMOL/L (ref 19.3–76.4)
SODIUM SERPL-SCNC: 140 MMOL/L (ref 136–145)
T3FREE SERPL-MCNC: 2.5 PG/ML (ref 2–4.4)
T4 FREE SERPL-MCNC: 1.71 NG/DL (ref 0.93–1.7)
T4 SERPL-MCNC: 7.46 MCG/DL (ref 4.5–11.7)
TESTOST FREE SERPL-MCNC: 7.3 PG/ML (ref 7.2–24)
TESTOST SERPL-MCNC: 885 NG/DL (ref 264–916)
TRIGL SERPL-MCNC: 153 MG/DL (ref 0–150)
TSH SERPL DL<=0.005 MIU/L-ACNC: 0.46 UIU/ML (ref 0.27–4.2)
URATE SERPL-MCNC: 3.8 MG/DL (ref 3.4–7)
VLDLC SERPL CALC-MCNC: 30.6 MG/DL

## 2020-07-27 ENCOUNTER — OFFICE VISIT (OUTPATIENT)
Dept: ENDOCRINOLOGY | Age: 60
End: 2020-07-27

## 2020-07-27 VITALS
DIASTOLIC BLOOD PRESSURE: 66 MMHG | SYSTOLIC BLOOD PRESSURE: 118 MMHG | RESPIRATION RATE: 16 BRPM | HEIGHT: 69 IN | BODY MASS INDEX: 24.59 KG/M2 | WEIGHT: 166 LBS

## 2020-07-27 DIAGNOSIS — N40.0 BENIGN PROSTATIC HYPERPLASIA WITHOUT LOWER URINARY TRACT SYMPTOMS: ICD-10-CM

## 2020-07-27 DIAGNOSIS — E78.5 DYSLIPIDEMIA: ICD-10-CM

## 2020-07-27 DIAGNOSIS — E55.9 VITAMIN D DEFICIENCY: ICD-10-CM

## 2020-07-27 DIAGNOSIS — E03.9 ACQUIRED HYPOTHYROIDISM: Primary | ICD-10-CM

## 2020-07-27 DIAGNOSIS — R53.82 CHRONIC FATIGUE: ICD-10-CM

## 2020-07-27 DIAGNOSIS — R73.9 HYPERGLYCEMIA: ICD-10-CM

## 2020-07-27 PROCEDURE — 99214 OFFICE O/P EST MOD 30 MIN: CPT | Performed by: INTERNAL MEDICINE

## 2020-07-27 RX ORDER — LEVOTHYROXINE SODIUM 150 MCG
150 TABLET ORAL DAILY
Qty: 90 TABLET | Refills: 3 | Status: SHIPPED | OUTPATIENT
Start: 2020-07-27 | End: 2021-03-01

## 2020-07-27 RX ORDER — PITAVASTATIN CALCIUM 4.18 MG/1
TABLET, FILM COATED ORAL
Qty: 90 TABLET | Refills: 3 | Status: SHIPPED | OUTPATIENT
Start: 2020-07-27 | End: 2021-03-01 | Stop reason: ALTCHOICE

## 2020-07-27 RX ORDER — ERGOCALCIFEROL 1.25 MG/1
50000 CAPSULE ORAL
Qty: 13 CAPSULE | Refills: 3 | Status: SHIPPED | OUTPATIENT
Start: 2020-07-27 | End: 2021-10-19 | Stop reason: SDUPTHER

## 2020-07-27 NOTE — PROGRESS NOTES
"Subjective   Nguyễn Santana is a 59 y.o. male seen for follow up for Hypothyroidism; Vitamin D Deficiency; Hyperlipidemia; and lab review  Patient states that some days after taking thyroid medication he will feel hot but symptoms go away after eating.       History of Present Illness this is a 59-year-old gentleman known patient with hypothyroidism as well as dyslipidemia and vitamin D deficiency.  Over the course of last 6 months he has had no significant health problem for which to go to the emergency room or hospital.    The following portions of the patient's history were reviewed and updated as appropriate: allergies, current medications, past family history, past medical history, past social history, past surgical history and problem list.      /66   Resp 16   Ht 175.3 cm (69\")   Wt 75.3 kg (166 lb)   BMI 24.51 kg/m²      Allergies   Allergen Reactions   • Levothyroxine      Patient cannot take the generic. He can only take the brand name.          Current Outpatient Medications:   •  ADVAIR DISKUS 250-50 MCG/DOSE DISKUS, , Disp: , Rfl:   •  albuterol (PROVENTIL) (2.5 MG/3ML) 0.083% nebulizer solution, Take 2.5 mg by nebulization 4 (Four) Times a Day. Patient will call when needed, Disp: 125 vial, Rfl: 6  •  albuterol (PROVENTIL) 2 MG tablet, Take 1 tablet by mouth 4 (Four) Times a Day., Disp: 120 tablet, Rfl: 6  •  albuterol sulfate  (90 Base) MCG/ACT inhaler, Inhale 2 puffs Every 4 (Four) Hours As Needed for Wheezing., Disp: 1 inhaler, Rfl: 5  •  budesonide (PULMICORT) 0.5 MG/2ML nebulizer solution, Take 0.5 mg by nebulization Daily., Disp: , Rfl:   •  montelukast (SINGULAIR) 10 MG tablet, Take 1 tablet by mouth Daily. Patient will call when needed, Disp: 30 tablet, Rfl: 3  •  O2 (OXYGEN), Inhale 4 L/min 1 (One) Time. 4 LPM HS only, Disp: , Rfl:   •  predniSONE (DELTASONE) 10 MG tablet, Take 10 mg by mouth Daily., Disp: , Rfl:   •  Respiratory Therapy Supplies (NEBULIZER) device, , " Disp: , Rfl:   •  SYNTHROID 150 MCG tablet, Take 1 tablet by mouth Daily., Disp: 30 tablet, Rfl: 11  •  vitamin D (ERGOCALCIFEROL) 1.25 MG (13856 UT) capsule capsule, Take 1 capsule by mouth Every 7 (Seven) Days. Patient will call when needed, Disp: 13 capsule, Rfl: 3     Review of Systems   Constitutional: Negative.    HENT: Negative.    Eyes: Negative.    Respiratory: Negative.    Cardiovascular: Negative.    Gastrointestinal: Negative.    Endocrine: Negative.    Genitourinary: Negative.    Musculoskeletal: Negative.    Skin: Negative.    Allergic/Immunologic: Negative.    Neurological: Negative.    Hematological: Negative.    Psychiatric/Behavioral: Negative.        Objective   Physical Exam   Constitutional: He is oriented to person, place, and time. He appears well-developed and well-nourished. No distress.   HENT:   Head: Normocephalic and atraumatic.   Right Ear: External ear normal.   Left Ear: External ear normal.   Nose: Nose normal.   Mouth/Throat: Oropharynx is clear and moist. No oropharyngeal exudate.   Eyes: Pupils are equal, round, and reactive to light. Conjunctivae and EOM are normal. Right eye exhibits no discharge. Left eye exhibits no discharge. No scleral icterus.   Neck: Normal range of motion. Neck supple. No JVD present. No tracheal deviation present. Thyromegaly present.   Somewhat a nonhomogeneous enlargement of his thyroid to about 1-1/2 to twice normal size.   Cardiovascular: Normal rate, regular rhythm, normal heart sounds and intact distal pulses. Exam reveals no gallop and no friction rub.   No murmur heard.  Pulmonary/Chest: Effort normal and breath sounds normal. No stridor. No respiratory distress. He has no wheezes. He has no rales. He exhibits no tenderness.   Abdominal: Soft. Bowel sounds are normal. He exhibits no distension and no mass. There is no tenderness. There is no rebound and no guarding. No hernia.   Musculoskeletal: Normal range of motion. He exhibits no edema,  tenderness or deformity.   Lymphadenopathy:     He has no cervical adenopathy.   Neurological: He is alert and oriented to person, place, and time. He has normal reflexes. He displays normal reflexes. No cranial nerve deficit or sensory deficit. He exhibits normal muscle tone. Coordination normal.   Skin: Skin is warm and dry. No rash noted. He is not diaphoretic. No erythema. No pallor.   Psychiatric: He has a normal mood and affect. His behavior is normal. Judgment and thought content normal.   Nursing note and vitals reviewed.  No significant change since 3/23/2020 office visit.    Results for orders placed or performed in visit on 07/13/20   Hemoglobin & Hematocrit, Blood   Result Value Ref Range    Hemoglobin 16.4 13.0 - 17.7 g/dL    Hematocrit 48.4 37.5 - 51.0 %   PSA DIAGNOSTIC   Result Value Ref Range    PSA 2.550 0.000 - 4.000 ng/mL   MicroAlbumin, Urine, Random - Urine, Clean Catch   Result Value Ref Range    Microalbumin, Urine 8.5 Not Estab. ug/mL   Lipid Panel   Result Value Ref Range    Total Cholesterol 214 (H) 0 - 200 mg/dL    Triglycerides 153 (H) 0 - 150 mg/dL    HDL Cholesterol 61 (H) 40 - 60 mg/dL    VLDL Cholesterol 30.6 mg/dL    LDL Cholesterol  122 (H) 0 - 100 mg/dL   Hemoglobin A1c   Result Value Ref Range    Hemoglobin A1C 5.50 4.80 - 5.60 %   C-Peptide   Result Value Ref Range    C-Peptide 3.1 1.1 - 4.4 ng/mL   Comprehensive Metabolic Panel   Result Value Ref Range    Glucose 77 65 - 99 mg/dL    BUN 17 6 - 20 mg/dL    Creatinine 1.04 0.76 - 1.27 mg/dL    eGFR Non African Am 74 >60 mL/min/1.73    eGFR African Am 89 >60 mL/min/1.73    BUN/Creatinine Ratio 16.3 7.0 - 25.0    Sodium 140 136 - 145 mmol/L    Potassium 4.5 3.5 - 5.2 mmol/L    Chloride 104 98 - 107 mmol/L    Total CO2 26.1 22.0 - 29.0 mmol/L    Calcium 9.2 8.6 - 10.5 mg/dL    Total Protein 6.3 6.0 - 8.5 g/dL    Albumin 4.50 3.50 - 5.20 g/dL    Globulin 1.8 gm/dL    A/G Ratio 2.5 g/dL    Total Bilirubin 0.6 0.0 - 1.2 mg/dL     Alkaline Phosphatase 59 39 - 117 U/L    AST (SGOT) 14 1 - 40 U/L    ALT (SGPT) 18 1 - 41 U/L   Vitamin D 25 Hydroxy   Result Value Ref Range    25 Hydroxy, Vitamin D 41.2 30.0 - 100.0 ng/ml   Uric Acid   Result Value Ref Range    Uric Acid 3.8 3.4 - 7.0 mg/dL   TestT+TestF+SHBG   Result Value Ref Range    Testosterone, Total 885 264 - 916 ng/dL    Testosterone, Free 7.3 7.2 - 24.0 pg/mL    Sex Hormone Binding Globulin 96.4 (H) 19.3 - 76.4 nmol/L   T4, Free   Result Value Ref Range    Free T4 1.71 (H) 0.93 - 1.70 ng/dL   T3, Free   Result Value Ref Range    T3, Free 2.5 2.0 - 4.4 pg/mL   T4 & TSH ([facility])   Result Value Ref Range    TSH 0.457 0.270 - 4.200 uIU/mL    T4, Total 7.46 4.50 - 11.70 mcg/dL   Cardiovascular Risk Assessment   Result Value Ref Range    Interpretation Note    Diabetes Patient Education   Result Value Ref Range    PDF Image Not applicable         Assessment/Plan   Nguyễn was seen today for hypothyroidism, vitamin d deficiency, hyperlipidemia and lab review.    Diagnoses and all orders for this visit:    Acquired hypothyroidism  -     T4 & TSH ([facility]); Future  -     T3, Free; Future  -     T4, Free; Future  -     TestT+TestF+SHBG; Future  -     Thyroglobulin With Anti-TG; Future  -     Uric Acid; Future  -     Vitamin D 25 Hydroxy; Future  -     Comprehensive Metabolic Panel; Future  -     C-Peptide; Future  -     Hemoglobin A1c; Future  -     MicroAlbumin, Urine, Random - Urine, Clean Catch; Future  -     NMR LipoProfile; Future  -     PSA DIAGNOSTIC; Future  -     Hemoglobin & Hematocrit, Blood; Future    Vitamin D deficiency  -     T4 & TSH ([facility]); Future  -     T3, Free; Future  -     T4, Free; Future  -     TestT+TestF+SHBG; Future  -     Thyroglobulin With Anti-TG; Future  -     Uric Acid; Future  -     Vitamin D 25 Hydroxy; Future  -     Comprehensive Metabolic Panel; Future  -     C-Peptide; Future  -     Hemoglobin A1c; Future  -     MicroAlbumin, Urine, Random - Urine, Clean  Catch; Future  -     NMR LipoProfile; Future  -     PSA DIAGNOSTIC; Future  -     Hemoglobin & Hematocrit, Blood; Future  -     vitamin D (ERGOCALCIFEROL) 1.25 MG (26245 UT) capsule capsule; Take 1 capsule by mouth Every 7 (Seven) Days. Patient will call when needed    Dyslipidemia  -     T4 & TSH ([facility]); Future  -     T3, Free; Future  -     T4, Free; Future  -     TestT+TestF+SHBG; Future  -     Thyroglobulin With Anti-TG; Future  -     Uric Acid; Future  -     Vitamin D 25 Hydroxy; Future  -     Comprehensive Metabolic Panel; Future  -     C-Peptide; Future  -     Hemoglobin A1c; Future  -     MicroAlbumin, Urine, Random - Urine, Clean Catch; Future  -     NMR LipoProfile; Future  -     PSA DIAGNOSTIC; Future  -     Hemoglobin & Hematocrit, Blood; Future    Chronic fatigue  -     T4 & TSH ([facility]); Future  -     T3, Free; Future  -     T4, Free; Future  -     TestT+TestF+SHBG; Future  -     Thyroglobulin With Anti-TG; Future  -     Uric Acid; Future  -     Vitamin D 25 Hydroxy; Future  -     Comprehensive Metabolic Panel; Future  -     C-Peptide; Future  -     Hemoglobin A1c; Future  -     MicroAlbumin, Urine, Random - Urine, Clean Catch; Future  -     NMR LipoProfile; Future  -     PSA DIAGNOSTIC; Future  -     Hemoglobin & Hematocrit, Blood; Future    Hyperglycemia  -     T4 & TSH ([facility]); Future  -     T3, Free; Future  -     T4, Free; Future  -     TestT+TestF+SHBG; Future  -     Thyroglobulin With Anti-TG; Future  -     Uric Acid; Future  -     Vitamin D 25 Hydroxy; Future  -     Comprehensive Metabolic Panel; Future  -     C-Peptide; Future  -     Hemoglobin A1c; Future  -     MicroAlbumin, Urine, Random - Urine, Clean Catch; Future  -     NMR LipoProfile; Future  -     PSA DIAGNOSTIC; Future  -     Hemoglobin & Hematocrit, Blood; Future    Benign prostatic hyperplasia without lower urinary tract symptoms  -     T4 & TSH ([facility]); Future  -     T3, Free; Future  -     T4, Free; Future  -      TestT+TestF+SHBG; Future  -     Thyroglobulin With Anti-TG; Future  -     Uric Acid; Future  -     Vitamin D 25 Hydroxy; Future  -     Comprehensive Metabolic Panel; Future  -     C-Peptide; Future  -     Hemoglobin A1c; Future  -     MicroAlbumin, Urine, Random - Urine, Clean Catch; Future  -     NMR LipoProfile; Future  -     PSA DIAGNOSTIC; Future  -     Hemoglobin & Hematocrit, Blood; Future    Other orders  -     SYNTHROID 150 MCG tablet; Take 1 tablet by mouth Daily.  -     LIVALO 4 MG tablet; Take 1 tablet by mouth daily. Patient will call when needed      In summary I saw and examined this 59-year-old gentleman for above-mentioned problems.  I reviewed his laboratory evaluation of 7/13/2020 and provided him with a hard copy of it.  Overall he is clinically and metabolically stable and therefore we will go ahead and continue his current prescriptions.  I will see him in 6 months or sooner if needed with laboratory evaluation prior to each office visit.

## 2021-02-18 DIAGNOSIS — R73.9 HYPERGLYCEMIA: ICD-10-CM

## 2021-02-18 DIAGNOSIS — R53.82 CHRONIC FATIGUE: ICD-10-CM

## 2021-02-18 DIAGNOSIS — E78.5 DYSLIPIDEMIA: Primary | ICD-10-CM

## 2021-02-18 DIAGNOSIS — E03.9 ACQUIRED HYPOTHYROIDISM: ICD-10-CM

## 2021-02-18 DIAGNOSIS — E55.9 VITAMIN D DEFICIENCY: ICD-10-CM

## 2021-02-22 ENCOUNTER — LAB (OUTPATIENT)
Dept: ENDOCRINOLOGY | Age: 61
End: 2021-02-22

## 2021-02-22 DIAGNOSIS — E78.5 DYSLIPIDEMIA: ICD-10-CM

## 2021-02-22 DIAGNOSIS — E03.9 ACQUIRED HYPOTHYROIDISM: ICD-10-CM

## 2021-02-22 DIAGNOSIS — R73.9 HYPERGLYCEMIA: ICD-10-CM

## 2021-02-22 DIAGNOSIS — R53.82 CHRONIC FATIGUE: ICD-10-CM

## 2021-02-22 DIAGNOSIS — E55.9 VITAMIN D DEFICIENCY: ICD-10-CM

## 2021-02-23 LAB
25(OH)D3+25(OH)D2 SERPL-MCNC: 47.1 NG/ML (ref 30–100)
ALBUMIN SERPL-MCNC: 4.6 G/DL (ref 3.5–5.2)
ALBUMIN/GLOB SERPL: 2.3 G/DL
ALP SERPL-CCNC: 61 U/L (ref 39–117)
ALT SERPL-CCNC: 34 U/L (ref 1–41)
AST SERPL-CCNC: 27 U/L (ref 1–40)
BILIRUB SERPL-MCNC: 0.9 MG/DL (ref 0–1.2)
BUN SERPL-MCNC: 17 MG/DL (ref 6–20)
BUN/CREAT SERPL: 15.3 (ref 7–25)
C PEPTIDE SERPL-MCNC: 2.1 NG/ML (ref 1.1–4.4)
CALCIUM SERPL-MCNC: 9.6 MG/DL (ref 8.6–10.5)
CHLORIDE SERPL-SCNC: 103 MMOL/L (ref 98–107)
CHOLEST SERPL-MCNC: 235 MG/DL (ref 0–200)
CO2 SERPL-SCNC: 28.5 MMOL/L (ref 22–29)
CREAT SERPL-MCNC: 1.11 MG/DL (ref 0.76–1.27)
GLOBULIN SER CALC-MCNC: 2 GM/DL
GLUCOSE SERPL-MCNC: 75 MG/DL (ref 65–99)
HBA1C MFR BLD: 5.5 % (ref 4.8–5.6)
HDLC SERPL-MCNC: 63 MG/DL (ref 40–60)
INTERPRETATION: NORMAL
LDLC SERPL CALC-MCNC: 141 MG/DL (ref 0–100)
Lab: NORMAL
POTASSIUM SERPL-SCNC: 4.2 MMOL/L (ref 3.5–5.2)
PROT SERPL-MCNC: 6.6 G/DL (ref 6–8.5)
SODIUM SERPL-SCNC: 139 MMOL/L (ref 136–145)
T3FREE SERPL-MCNC: 2.7 PG/ML (ref 2–4.4)
T4 FREE SERPL-MCNC: 2.22 NG/DL (ref 0.93–1.7)
T4 SERPL-MCNC: 10.1 MCG/DL (ref 4.5–11.7)
TRIGL SERPL-MCNC: 174 MG/DL (ref 0–150)
TSH SERPL DL<=0.005 MIU/L-ACNC: 0.33 UIU/ML (ref 0.27–4.2)
VLDLC SERPL CALC-MCNC: 31 MG/DL (ref 5–40)

## 2021-03-01 ENCOUNTER — OFFICE VISIT (OUTPATIENT)
Dept: ENDOCRINOLOGY | Age: 61
End: 2021-03-01

## 2021-03-01 VITALS
DIASTOLIC BLOOD PRESSURE: 74 MMHG | WEIGHT: 180.4 LBS | BODY MASS INDEX: 26.72 KG/M2 | RESPIRATION RATE: 16 BRPM | SYSTOLIC BLOOD PRESSURE: 136 MMHG | HEIGHT: 69 IN

## 2021-03-01 DIAGNOSIS — E78.5 DYSLIPIDEMIA: ICD-10-CM

## 2021-03-01 DIAGNOSIS — E55.9 VITAMIN D DEFICIENCY: ICD-10-CM

## 2021-03-01 DIAGNOSIS — E03.9 ACQUIRED HYPOTHYROIDISM: Primary | ICD-10-CM

## 2021-03-01 PROCEDURE — 99214 OFFICE O/P EST MOD 30 MIN: CPT | Performed by: NURSE PRACTITIONER

## 2021-03-01 RX ORDER — TIOTROPIUM BROMIDE INHALATION SPRAY 3.12 UG/1
2 SPRAY, METERED RESPIRATORY (INHALATION)
COMMUNITY
End: 2022-01-05

## 2021-03-01 RX ORDER — LEVOTHYROXINE SODIUM 137 MCG
137 TABLET ORAL DAILY
Qty: 30 TABLET | Refills: 5 | Status: SHIPPED | OUTPATIENT
Start: 2021-03-01 | End: 2021-10-19 | Stop reason: SDUPTHER

## 2021-03-01 NOTE — PROGRESS NOTES
"Chief Complaint  Hypothyroidism, Hyperlipidemia, Hypertension, and Vitamin D Deficiency    Subjective          Nguyễn Santana presents to [facility] ENDOCRINOLOGY  History of Present Illness  Hypothyroid diagnosed in 2014  Feels like his medication needs fluctuates- sometimes too much and sometimes not enough   Denies Graves or Hashimotos  Denies Dysphagia or voice changes  Denies Chest pain or palpitations  Fatigue r/t mulitple co-morbidities   Weight changes are trending   Denies Hair loss or dry skin  Denies Diarrhea or constipation  Lab Results   Component Value Date    TSH 0.332 02/22/2021     Not following a low cholesterol diet, needs refill on livalo  Lab Results   Component Value Date    CHLPL 235 (H) 02/22/2021    TRIG 174 (H) 02/22/2021    HDL 63 (H) 02/22/2021     (H) 02/22/2021     Vitamin d 50,000u q7days   47.1    Has COPD which makes it hard for him to determine if it is his thyroid or lungs causing his symptoms       Objective   Vital Signs:   /74   Resp 16   Ht 175.3 cm (69\")   Wt 81.8 kg (180 lb 6.4 oz)   BMI 26.64 kg/m²     Physical Exam  Vitals signs reviewed.   Constitutional:       General: He is not in acute distress.  HENT:      Head: Normocephalic and atraumatic.   Neck:      Musculoskeletal: Normal range of motion and neck supple.   Cardiovascular:      Rate and Rhythm: Normal rate and regular rhythm.   Pulmonary:      Effort: Pulmonary effort is normal. No respiratory distress.   Musculoskeletal: Normal range of motion.         General: No signs of injury.   Skin:     General: Skin is warm and dry.   Neurological:      Mental Status: He is alert and oriented to person, place, and time. Mental status is at baseline.   Psychiatric:         Mood and Affect: Mood normal.         Behavior: Behavior normal.         Thought Content: Thought content normal.         Judgment: Judgment normal.        Result Review :   The following data was reviewed by: " SHANT Watts on 03/01/2021:  Common labs    Common Labsle 7/13/20 7/13/20 7/13/20 7/13/20 7/13/20 7/13/20 7/13/20 2/22/21 2/22/21 2/22/21    1130 1130 1130 1130 1130 1130 1130 1249 1249 1249   Glucose      77    75   BUN      17    17   Creatinine      1.04    1.11   eGFR Non  Am      74    68   eGFR  Am      89    82   Sodium      140    139   Potassium      4.5    4.2   Chloride      104    103   Calcium      9.2    9.6   Total Protein      6.3    6.6   Albumin      4.50    4.60   Total Bilirubin      0.6    0.9   Alkaline Phosphatase      59    61   AST (SGOT)      14    27   ALT (SGPT)      18    34   Hemoglobin 16.4            Hematocrit 48.4            Total Cholesterol    214 (A)     235 (A)    Triglycerides    153 (A)     174 (A)    HDL Cholesterol    61 (A)     63 (A)    LDL Cholesterol     122 (A)     141 (A)    Hemoglobin A1C     5.50   5.50     Microalbumin, Urine   8.5          PSA  2.550           Uric Acid       3.8      (A) Abnormal value       Comments are available for some flowsheets but are not being displayed.                     Assessment and Plan    Diagnoses and all orders for this visit:    1. Acquired hypothyroidism (Primary)  -     TSH; Future  -     T4, Free; Future    2. Vitamin D deficiency    3. Dyslipidemia    Other orders  -     Synthroid 137 MCG tablet; Take 1 tablet by mouth Daily.  Dispense: 30 tablet; Refill: 5  -     pitavastatin calcium (Livalo) 2 MG tablet tablet; Take 1 tablet by mouth Every Night.  Dispense: 30 tablet; Refill: 5        Follow Up   Return in about 6 months (around 9/1/2021).     Decrease to 137mcg and repeat labs in 6 weeks   Needs close monitoring to reduce risk of complications from over or under treatment with thyroid hormone replacement      Make dietary changes for cholesterol and resume livalo     Continue vitamin D 50,000 units weekly    Patient was given instructions and counseling regarding his condition or for health  maintenance advice. Please see specific information pulled into the AVS if appropriate.

## 2021-03-01 NOTE — PATIENT INSTRUCTIONS
Dyslipidemia  Dyslipidemia is an imbalance of waxy, fat-like substances (lipids) in the blood. The body needs lipids in small amounts. Dyslipidemia often involves a high level of cholesterol or triglycerides, which are types of lipids.  Common forms of dyslipidemia include:  · High levels of LDL cholesterol. LDL is the type of cholesterol that causes fatty deposits (plaques) to build up in the blood vessels that carry blood away from your heart (arteries).  · Low levels of HDL cholesterol. HDL cholesterol is the type of cholesterol that protects against heart disease. High levels of HDL remove the LDL buildup from arteries.  · High levels of triglycerides. Triglycerides are a fatty substance in the blood that is linked to a buildup of plaques in the arteries.  What are the causes?  Primary dyslipidemia is caused by changes (mutations) in genes that are passed down through families (inherited). These mutations cause several types of dyslipidemia.  Secondary dyslipidemia is caused by lifestyle choices and diseases that lead to dyslipidemia, such as:  · Eating a diet that is high in animal fat.  · Not getting enough exercise.  · Having diabetes, kidney disease, liver disease, or thyroid disease.  · Drinking large amounts of alcohol.  · Using certain medicines.  What increases the risk?  You are more likely to develop this condition if you are an older man or if you are a woman who has gone through menopause. Other risk factors include:  · Having a family history of dyslipidemia.  · Taking certain medicines, including birth control pills, steroids, some diuretics, and beta-blockers.  · Smoking cigarettes.  · Eating a high-fat diet.  · Having certain medical conditions such as diabetes, polycystic ovary syndrome (PCOS), kidney disease, liver disease, or hypothyroidism.  · Not exercising regularly.  · Being overweight or obese with too much belly fat.  What are the signs or symptoms?  In most cases, dyslipidemia does not  usually cause any symptoms.  In severe cases, very high lipid levels can cause:  · Fatty bumps under the skin (xanthomas).  · White or gray ring around the black center (pupil) of the eye.  Very high triglyceride levels can cause inflammation of the pancreas (pancreatitis).  How is this diagnosed?  Your health care provider may diagnose dyslipidemia based on a routine blood test (fasting blood test). Because most people do not have symptoms of the condition, this blood testing (lipid profile) is done on adults age 20 and older and is repeated every 5 years. This test checks:  · Total cholesterol. This measures the total amount of cholesterol in your blood, including LDL cholesterol, HDL cholesterol, and triglycerides. A healthy number is below 200.  · LDL cholesterol. The target number for LDL cholesterol is different for each person, depending on individual risk factors. Ask your health care provider what your LDL cholesterol should be.  · HDL cholesterol. An HDL level of 60 or higher is best because it helps to protect against heart disease. A number below 40 for men or below 50 for women increases the risk for heart disease.  · Triglycerides. A healthy triglyceride number is below 150.  If your lipid profile is abnormal, your health care provider may do other blood tests.  How is this treated?  Treatment depends on the type of dyslipidemia that you have and your other risk factors for heart disease and stroke. Your health care provider will have a target range for your lipid levels based on this information.  For many people, this condition may be treated by lifestyle changes, such as diet and exercise. Your health care provider may recommend that you:  · Get regular exercise.  · Make changes to your diet.  · Quit smoking if you smoke.  If diet changes and exercise do not help you reach your goals, your health care provider may also prescribe medicine to lower lipids. The most commonly prescribed type of medicine  lowers your LDL cholesterol (statin drug). If you have a high triglyceride level, your provider may prescribe another type of drug (fibrate) or an omega-3 fish oil supplement, or both.  Follow these instructions at home:    Eating and drinking  · Follow instructions from your health care provider or dietitian about eating or drinking restrictions.  · Eat a healthy diet as told by your health care provider. This can help you reach and maintain a healthy weight, lower your LDL cholesterol, and raise your HDL cholesterol. This may include:  ? Limiting your calories, if you are overweight.  ? Eating more fruits, vegetables, whole grains, fish, and lean meats.  ? Limiting saturated fat, trans fat, and cholesterol.  · If you drink alcohol:  ? Limit how much you use.  ? Be aware of how much alcohol is in your drink. In the [place] one drink equals one 12 oz bottle of beer (355 mL), one 5 oz glass of wine (148 mL), or one 1½ oz glass of hard liquor (44 mL).  · Do not drink alcohol if:  ? Your health care provider tells you not to drink.  ? You are pregnant, may be pregnant, or are planning to become pregnant.  Activity  · Get regular exercise. Start an exercise and strength training program as told by your health care provider. Ask your health care provider what activities are safe for you. Your health care provider may recommend:  ? 30 minutes of aerobic activity 4-6 days a week. Brisk walking is an example of aerobic activity.  ? Strength training 2 days a week.  General instructions  · Do not use any products that contain nicotine or tobacco, such as cigarettes, e-cigarettes, and chewing tobacco. If you need help quitting, ask your health care provider.  · Take over-the-counter and prescription medicines only as told by your health care provider. This includes supplements.  · Keep all follow-up visits as told by your health care provider.  Contact a health care provider if:  · You are:  ? Having trouble sticking to your  exercise or diet plan.  ? Struggling to quit smoking or control your use of alcohol.  Summary  · Dyslipidemia often involves a high level of cholesterol or triglycerides, which are types of lipids.  · Treatment depends on the type of dyslipidemia that you have and your other risk factors for heart disease and stroke.  · For many people, treatment starts with lifestyle changes, such as diet and exercise.  · Your health care provider may prescribe medicine to lower lipids.  This information is not intended to replace advice given to you by your health care provider. Make sure you discuss any questions you have with your health care provider.  Document Revised: 08/12/2019 Document Reviewed: 07/19/2019  [employer] Patient Education © 2020 [employer]

## 2021-04-16 LAB
T4 FREE SERPL-MCNC: 1.87 NG/DL (ref 0.93–1.7)
TSH SERPL DL<=0.005 MIU/L-ACNC: 0.65 UIU/ML (ref 0.27–4.2)

## 2021-04-19 NOTE — PROGRESS NOTES
Thyroid labs improving, will continue current dose and repeat labs before next visit.  Spoke to patient at 10:41 AM

## 2021-07-07 ENCOUNTER — OFFICE VISIT (OUTPATIENT)
Dept: FAMILY MEDICINE CLINIC | Facility: CLINIC | Age: 61
End: 2021-07-07

## 2021-07-07 VITALS
TEMPERATURE: 97.8 F | BODY MASS INDEX: 25.33 KG/M2 | WEIGHT: 171 LBS | HEIGHT: 69 IN | OXYGEN SATURATION: 94 % | RESPIRATION RATE: 16 BRPM

## 2021-07-07 DIAGNOSIS — R10.12 LEFT UPPER QUADRANT PAIN: ICD-10-CM

## 2021-07-07 DIAGNOSIS — R20.2 PARESTHESIAS: ICD-10-CM

## 2021-07-07 DIAGNOSIS — R07.89 OTHER CHEST PAIN: ICD-10-CM

## 2021-07-07 DIAGNOSIS — I10 HYPERTENSION, UNSPECIFIED TYPE: Primary | ICD-10-CM

## 2021-07-07 DIAGNOSIS — G62.9 NEUROPATHY: ICD-10-CM

## 2021-07-07 DIAGNOSIS — D75.89 MACROCYTOSIS: ICD-10-CM

## 2021-07-07 DIAGNOSIS — D72.829 LEUKOCYTOSIS, UNSPECIFIED TYPE: ICD-10-CM

## 2021-07-07 PROBLEM — K21.9 GASTROESOPHAGEAL REFLUX DISEASE: Status: ACTIVE | Noted: 2021-07-07

## 2021-07-07 PROCEDURE — 99214 OFFICE O/P EST MOD 30 MIN: CPT | Performed by: PHYSICIAN ASSISTANT

## 2021-07-07 RX ORDER — LISINOPRIL 5 MG/1
5 TABLET ORAL
COMMUNITY
Start: 2021-06-29 | End: 2021-08-02 | Stop reason: SDUPTHER

## 2021-07-07 RX ORDER — CYCLOBENZAPRINE HCL 5 MG
5 TABLET ORAL
COMMUNITY
Start: 2021-06-29 | End: 2021-07-09

## 2021-07-13 LAB
BASOPHILS # BLD AUTO: 0.11 10*3/MM3 (ref 0–0.2)
BASOPHILS NFR BLD AUTO: 1.1 % (ref 0–1.5)
BUN SERPL-MCNC: 16 MG/DL (ref 6–20)
BUN/CREAT SERPL: 14.3 (ref 7–25)
CALCIUM SERPL-MCNC: 9.3 MG/DL (ref 8.6–10.5)
CHLORIDE SERPL-SCNC: 103 MMOL/L (ref 98–107)
CO2 SERPL-SCNC: 22.8 MMOL/L (ref 22–29)
CREAT SERPL-MCNC: 1.12 MG/DL (ref 0.76–1.27)
EOSINOPHIL # BLD AUTO: 0.14 10*3/MM3 (ref 0–0.4)
EOSINOPHIL NFR BLD AUTO: 1.4 % (ref 0.3–6.2)
ERYTHROCYTE [DISTWIDTH] IN BLOOD BY AUTOMATED COUNT: 13 % (ref 12.3–15.4)
FOLATE SERPL-MCNC: 8.77 NG/ML (ref 4.78–24.2)
GLUCOSE SERPL-MCNC: 91 MG/DL (ref 65–99)
HCT VFR BLD AUTO: 46.9 % (ref 37.5–51)
HGB BLD-MCNC: 15.7 G/DL (ref 13–17.7)
IMM GRANULOCYTES # BLD AUTO: 0.06 10*3/MM3 (ref 0–0.05)
IMM GRANULOCYTES NFR BLD AUTO: 0.6 % (ref 0–0.5)
LYMPHOCYTES # BLD AUTO: 2.14 10*3/MM3 (ref 0.7–3.1)
LYMPHOCYTES NFR BLD AUTO: 21.6 % (ref 19.6–45.3)
MCH RBC QN AUTO: 31.3 PG (ref 26.6–33)
MCHC RBC AUTO-ENTMCNC: 33.5 G/DL (ref 31.5–35.7)
MCV RBC AUTO: 93.4 FL (ref 79–97)
MONOCYTES # BLD AUTO: 0.79 10*3/MM3 (ref 0.1–0.9)
MONOCYTES NFR BLD AUTO: 8 % (ref 5–12)
NEUTROPHILS # BLD AUTO: 6.66 10*3/MM3 (ref 1.7–7)
NEUTROPHILS NFR BLD AUTO: 67.3 % (ref 42.7–76)
NRBC BLD AUTO-RTO: 0 /100 WBC (ref 0–0.2)
PLATELET # BLD AUTO: 284 10*3/MM3 (ref 140–450)
POTASSIUM SERPL-SCNC: 4.5 MMOL/L (ref 3.5–5.2)
RBC # BLD AUTO: 5.02 10*6/MM3 (ref 4.14–5.8)
SODIUM SERPL-SCNC: 138 MMOL/L (ref 136–145)
VIT B12 SERPL-MCNC: 685 PG/ML (ref 211–946)
WBC # BLD AUTO: 9.9 10*3/MM3 (ref 3.4–10.8)

## 2021-07-16 ENCOUNTER — TELEPHONE (OUTPATIENT)
Dept: FAMILY MEDICINE CLINIC | Facility: CLINIC | Age: 61
End: 2021-07-16

## 2021-07-19 NOTE — TELEPHONE ENCOUNTER
Caller: Nguyễn Santana    Relationship to patient: Self    Best call back number: [phone]    Patient is needing: RETURNING CALL TO April ABOUT BLOOD PRESSURE READINGS     PLEASE CALL

## 2021-08-02 ENCOUNTER — TELEPHONE (OUTPATIENT)
Dept: FAMILY MEDICINE CLINIC | Facility: CLINIC | Age: 61
End: 2021-08-02

## 2021-08-02 NOTE — TELEPHONE ENCOUNTER
Caller: Nguyễn Santana    Relationship: Self    Best call back number: [phone]     Medication needed:   Requested Prescriptions     Pending Prescriptions Disp Refills   • lisinopril (PRINIVIL,ZESTRIL) 5 MG tablet       Si tablet.       When do you need the refill by: ASAP     What additional details did the patient provide when requesting the medication: HAS ENOUGH FOR 2 DAYS   Does the patient have less than a 3 day supply:  [x] Yes  [] No    What is the patient's preferred pharmacy: [facility] [address] [phone] [facility] [phone] FX

## 2021-08-03 RX ORDER — LISINOPRIL 5 MG/1
5 TABLET ORAL DAILY
Qty: 90 TABLET | Refills: 0 | Status: SHIPPED | OUTPATIENT
Start: 2021-08-03 | End: 2021-10-11 | Stop reason: SDUPTHER

## 2021-10-11 ENCOUNTER — TELEPHONE (OUTPATIENT)
Dept: FAMILY MEDICINE CLINIC | Facility: CLINIC | Age: 61
End: 2021-10-11

## 2021-10-11 RX ORDER — LISINOPRIL 5 MG/1
5 TABLET ORAL DAILY
Qty: 90 TABLET | Refills: 0 | Status: SHIPPED | OUTPATIENT
Start: 2021-10-11 | End: 2021-10-19 | Stop reason: SDUPTHER

## 2021-10-11 NOTE — TELEPHONE ENCOUNTER
Caller: Nguyễn Santana    Relationship: Self      Medication requested (name and dosage): lisinopril (PRINIVIL,ZESTRIL) 5 MG tablet    Pharmacy where request should be sent: [facility] [id number] [place] [address] - [phone]  - [phone]     Additional details provided by patient: DOES HE NEED AN  APPOINTMENT TO BE SEEN?   Best call back number: [phone]    Does the patient have less than a 3 day supply:  [] Yes  [x] No    [facility] Rep   10/11/21 11:08 EDT

## 2021-10-11 NOTE — TELEPHONE ENCOUNTER
Last ov 7/7/21    I have reviewed hospital records, including notes, labs, and imaging. Follow up in no more than 6 months if he is feeling well without concerns    Last lab 4/15/21

## 2021-10-15 NOTE — TELEPHONE ENCOUNTER
PATIENT IS WANTING AN UPDATE ON THIS MEDICATION REQUEST.     CAN YOU CALL THIS IN TO [facility], HE WILL BE OUT OF MEDS BY Monday    PLEASE ADVISE     Nguyễn Santnaa (Self) [phone] (H)

## 2021-10-18 ENCOUNTER — TELEPHONE (OUTPATIENT)
Dept: FAMILY MEDICINE CLINIC | Facility: CLINIC | Age: 61
End: 2021-10-18

## 2021-10-18 NOTE — TELEPHONE ENCOUNTER
Pt says that the pharmacy has not received the prescription refill request for his linsinopril even though it was confirmed last Monday. plz advise?

## 2021-10-19 ENCOUNTER — OFFICE VISIT (OUTPATIENT)
Dept: ENDOCRINOLOGY | Age: 61
End: 2021-10-19

## 2021-10-19 VITALS
DIASTOLIC BLOOD PRESSURE: 70 MMHG | OXYGEN SATURATION: 98 % | SYSTOLIC BLOOD PRESSURE: 136 MMHG | BODY MASS INDEX: 26.07 KG/M2 | WEIGHT: 176 LBS | HEIGHT: 69 IN | HEART RATE: 70 BPM

## 2021-10-19 DIAGNOSIS — E03.9 ACQUIRED HYPOTHYROIDISM: Primary | ICD-10-CM

## 2021-10-19 DIAGNOSIS — E55.9 VITAMIN D DEFICIENCY: ICD-10-CM

## 2021-10-19 DIAGNOSIS — E78.5 DYSLIPIDEMIA: ICD-10-CM

## 2021-10-19 DIAGNOSIS — R53.82 CHRONIC FATIGUE: ICD-10-CM

## 2021-10-19 DIAGNOSIS — R73.9 HYPERGLYCEMIA: ICD-10-CM

## 2021-10-19 PROCEDURE — 99214 OFFICE O/P EST MOD 30 MIN: CPT | Performed by: NURSE PRACTITIONER

## 2021-10-19 RX ORDER — LEVOTHYROXINE SODIUM 137 MCG
137 TABLET ORAL DAILY
Qty: 90 TABLET | Refills: 1 | Status: SHIPPED | OUTPATIENT
Start: 2021-10-19 | End: 2021-10-19

## 2021-10-19 RX ORDER — LEVOTHYROXINE SODIUM 125 MCG
125 TABLET ORAL DAILY
Qty: 90 TABLET | Refills: 1 | Status: SHIPPED | OUTPATIENT
Start: 2021-10-19 | End: 2022-02-15 | Stop reason: SDUPTHER

## 2021-10-19 RX ORDER — ERGOCALCIFEROL 1.25 MG/1
50000 CAPSULE ORAL
Qty: 13 CAPSULE | Refills: 3 | Status: SHIPPED | OUTPATIENT
Start: 2021-10-19 | End: 2022-05-05 | Stop reason: SDUPTHER

## 2021-10-19 RX ORDER — LISINOPRIL 5 MG/1
5 TABLET ORAL DAILY
Qty: 90 TABLET | Refills: 0 | Status: SHIPPED | OUTPATIENT
Start: 2021-10-19 | End: 2022-01-05 | Stop reason: SDUPTHER

## 2021-10-19 NOTE — PROGRESS NOTES
"Chief Complaint  Hypothyroidism    Subjective          Nguyễn Santana presents to [facility] ENDOCRINOLOGY  History of Present Illness     I have reviewed PMH, allergies and medications UTD at this visit     Gets tired after eating. Happens with all food      5 grandkids live with me  Wife had a stroke and he is the primary caregiver    Also on new BP medication    Hypothyroid   diagnosed in 2014  Still Feels like his medication needs fluctuates- sometimes too much and sometimes not enough  Synthroid 137mcg Daily     No changes to his symptoms from last visit   Denies Dysphagia or voice changes  Denies Chest pain or palpitations  Fatigue stable  Weight changes arestable  Denies Hair loss or dry skin  Denies Diarrhea or constipation  Lab Results   Component Value Date    TSH 0.574 10/05/2021       HLP  pitavastatin 2mg daily   Lab Results   Component Value Date    CHLPL 235 (H) 02/22/2021    TRIG 174 (H) 02/22/2021    HDL 63 (H) 02/22/2021     (H) 02/22/2021         Objective   Vital Signs:   /70 (BP Location: Right arm, Patient Position: Sitting, Cuff Size: Large Adult)   Pulse 70   Ht 175.3 cm (69.02\")   Wt 79.8 kg (176 lb)   SpO2 98%   BMI 25.98 kg/m²     Physical Exam  Vitals reviewed.   Constitutional:       General: He is not in acute distress.  HENT:      Head: Normocephalic and atraumatic.   Cardiovascular:      Rate and Rhythm: Normal rate and regular rhythm.   Pulmonary:      Effort: Pulmonary effort is normal. No respiratory distress.   Musculoskeletal:         General: No signs of injury. Normal range of motion.      Cervical back: Normal range of motion and neck supple.   Skin:     General: Skin is warm and dry.   Neurological:      Mental Status: He is alert and oriented to person, place, and time. Mental status is at baseline.   Psychiatric:         Mood and Affect: Mood normal.         Behavior: Behavior normal.         Thought Content: Thought content normal.   "       Judgment: Judgment normal.          Result Review :   The following data was reviewed by: SHANT Watts on 10/19/2021:  Common labs    Common Labsle 2/22/21 2/22/21 2/22/21 7/13/21 7/13/21    1249 1249 1249 0855 0855   Glucose   75 91    BUN   17 16    Creatinine   1.11 1.12    eGFR Non  Am   68 67    eGFR African Am   82 82    Sodium   139 138    Potassium   4.2 4.5    Chloride   103 103    Calcium   9.6 9.3    Total Protein   6.6     Albumin   4.60     Total Bilirubin   0.9     Alkaline Phosphatase   61     AST (SGOT)   27     ALT (SGPT)   34     WBC     9.90   Hemoglobin     15.7   Hematocrit     46.9   Platelets     284   Total Cholesterol  235 (A)      Triglycerides  174 (A)      HDL Cholesterol  63 (A)      LDL Cholesterol   141 (A)      Hemoglobin A1C 5.50       (A) Abnormal value       Comments are available for some flowsheets but are not being displayed.                     Assessment and Plan    Diagnoses and all orders for this visit:    1. Acquired hypothyroidism (Primary)    2. Vitamin D deficiency  -     vitamin D (ERGOCALCIFEROL) 1.25 MG (00986 UT) capsule capsule; Take 1 capsule by mouth Every 7 (Seven) Days. Patient will call when needed  Dispense: 13 capsule; Refill: 3    3. Dyslipidemia    Other orders  -     Synthroid 137 MCG tablet; Take 1 tablet by mouth Daily.  Dispense: 90 tablet; Refill: 1  -     pitavastatin calcium (Livalo) 2 MG tablet tablet; Take 1 tablet by mouth Every Night.  Dispense: 90 tablet; Refill: 1        Follow Up   Return in about 3 months (around 1/19/2022).   Change dose to 125mcg and repeat labs in 2-3 months   Gave glucometer to check blood sugar after meals in the setting of fatigue    Patient was given instructions and counseling regarding his condition or for health maintenance advice. Please see specific information pulled into the AVS if appropriate.     SHANT Watts

## 2022-01-05 ENCOUNTER — OFFICE VISIT (OUTPATIENT)
Dept: FAMILY MEDICINE CLINIC | Facility: CLINIC | Age: 62
End: 2022-01-05

## 2022-01-05 VITALS
WEIGHT: 177 LBS | OXYGEN SATURATION: 96 % | SYSTOLIC BLOOD PRESSURE: 118 MMHG | HEIGHT: 69 IN | HEART RATE: 71 BPM | TEMPERATURE: 97.3 F | BODY MASS INDEX: 26.22 KG/M2 | DIASTOLIC BLOOD PRESSURE: 70 MMHG | RESPIRATION RATE: 16 BRPM

## 2022-01-05 DIAGNOSIS — D75.89 MACROCYTOSIS: ICD-10-CM

## 2022-01-05 DIAGNOSIS — D72.829 LEUKOCYTOSIS, UNSPECIFIED TYPE: ICD-10-CM

## 2022-01-05 DIAGNOSIS — F41.9 ANXIETY: ICD-10-CM

## 2022-01-05 DIAGNOSIS — I10 HYPERTENSION, UNSPECIFIED TYPE: Primary | ICD-10-CM

## 2022-01-05 PROCEDURE — 99214 OFFICE O/P EST MOD 30 MIN: CPT | Performed by: PHYSICIAN ASSISTANT

## 2022-01-05 RX ORDER — LISINOPRIL 5 MG/1
5 TABLET ORAL DAILY
Qty: 90 TABLET | Refills: 1 | Status: SHIPPED | OUTPATIENT
Start: 2022-01-05 | End: 2022-07-06 | Stop reason: SDUPTHER

## 2022-01-05 RX ORDER — LEVOTHYROXINE SODIUM 137 MCG
TABLET ORAL
COMMUNITY
Start: 2021-10-19 | End: 2022-01-05

## 2022-01-05 RX ORDER — ASPIRIN 81 MG/1
81 TABLET, CHEWABLE ORAL DAILY
COMMUNITY

## 2022-01-05 RX ORDER — HYDROXYZINE HYDROCHLORIDE 10 MG/1
10 TABLET, FILM COATED ORAL DAILY PRN
Qty: 30 TABLET | Refills: 0 | Status: SHIPPED | OUTPATIENT
Start: 2022-01-05 | End: 2022-05-05

## 2022-02-15 ENCOUNTER — OFFICE VISIT (OUTPATIENT)
Dept: ENDOCRINOLOGY | Age: 62
End: 2022-02-15

## 2022-02-15 VITALS
OXYGEN SATURATION: 98 % | BODY MASS INDEX: 27.46 KG/M2 | HEIGHT: 68 IN | SYSTOLIC BLOOD PRESSURE: 125 MMHG | WEIGHT: 181.2 LBS | HEART RATE: 83 BPM | DIASTOLIC BLOOD PRESSURE: 71 MMHG

## 2022-02-15 DIAGNOSIS — E03.9 ACQUIRED HYPOTHYROIDISM: Primary | ICD-10-CM

## 2022-02-15 DIAGNOSIS — E78.5 DYSLIPIDEMIA: ICD-10-CM

## 2022-02-15 PROCEDURE — 99214 OFFICE O/P EST MOD 30 MIN: CPT | Performed by: NURSE PRACTITIONER

## 2022-02-15 RX ORDER — LEVOTHYROXINE SODIUM 125 MCG
125 TABLET ORAL DAILY
Qty: 90 TABLET | Refills: 1 | Status: SHIPPED | OUTPATIENT
Start: 2022-02-15 | End: 2022-05-05

## 2022-02-15 NOTE — PROGRESS NOTES
"Chief Complaint  Hypothyroidism (follow up)    Subjective          Nguyễn Santana presents to [facility] ENDOCRINOLOGY  History of Present Illness     I have reviewed PMH, allergies and medications UTD at this visit     Hypothyroid   diagnosed in 2014  Still Feels like his medication needs fluctuates- sometimes too much and sometimes not enough  Synthroid 125 mcg Daily- dose was decreased last visit from 137mcg   C/o being hot and cold in the morning after taking medication that is better after eating   Lab Results   Component Value Date    TSH 1.600 02/01/2022       HLP  pitavastatin 2mg daily   Unsure if he is taking this or not  Lab Results   Component Value Date    CHLPL 252 (H) 02/01/2022    TRIG 183 (H) 02/01/2022    HDL 66 02/01/2022     (H) 02/01/2022       Objective   Vital Signs:   /71   Pulse 83   Ht 172.7 cm (68\")   Wt 82.2 kg (181 lb 3.2 oz)   SpO2 98%   BMI 27.55 kg/m²     Physical Exam  Vitals reviewed.   Constitutional:       General: He is not in acute distress.  HENT:      Head: Normocephalic and atraumatic.   Cardiovascular:      Rate and Rhythm: Normal rate and regular rhythm.   Pulmonary:      Effort: Pulmonary effort is normal. No respiratory distress.   Musculoskeletal:         General: No signs of injury. Normal range of motion.      Cervical back: Normal range of motion and neck supple.   Skin:     General: Skin is warm and dry.   Neurological:      Mental Status: He is alert and oriented to person, place, and time. Mental status is at baseline.   Psychiatric:         Mood and Affect: Mood normal.         Behavior: Behavior normal.         Thought Content: Thought content normal.         Judgment: Judgment normal.          Result Review :   The following data was reviewed by: SHANT Watts on 02/15/2022:  Common labs    Common Labsle 2/22/21 2/22/21 2/22/21 7/13/21 7/13/21 2/1/22 2/1/22 2/1/22 2/1/22    1249 1249 1249 0855 0855 1339 1339 1339 " 1339   Glucose   75 91   80     BUN   17 16   18     Creatinine   1.11 1.12   1.21     eGFR Non  Am   68 67   65     eGFR  Am   82 82   75     Sodium   139 138   137     Potassium   4.2 4.5   4.2     Chloride   103 103   101     Calcium   9.6 9.3   9.4     Total Protein   6.6    6.4     Albumin   4.60    4.5     Total Bilirubin   0.9    0.8     Alkaline Phosphatase   61    70     AST (SGOT)   27    18     ALT (SGPT)   34    21     WBC     9.90       Hemoglobin     15.7       Hematocrit     46.9       Platelets     284       Total Cholesterol  235 (A)      252 (A)    Triglycerides  174 (A)      183 (A)    HDL Cholesterol  63 (A)      66    LDL Cholesterol   141 (A)      153 (A)    Hemoglobin A1C 5.50     5.5      Microalbumin, Urine         CANCELED   (A) Abnormal value       Comments are available for some flowsheets but are not being displayed.                     Assessment and Plan    Diagnoses and all orders for this visit:    1. Acquired hypothyroidism (Primary)  -     Pitavastatin Calcium 4 MG tablet; Take 1 tablet by mouth Every Night.  Dispense: 30 tablet; Refill: 5  -     Synthroid 125 MCG tablet; Take 1 tablet by mouth Daily.  Dispense: 90 tablet; Refill: 1  -     TSH; Future  -     T4, Free; Future  -     T3, Free; Future  -     Comprehensive Metabolic Panel; Future  -     Lipid Panel; Future    2. Dyslipidemia        Follow Up   No follow-ups on file.     increase livalo to 4mg daily, LDL uncontrolled    Continue current t4 dose, TSH stable, need to closely monitor t4 dose   Seeing PCP for gall stones   Improve low cholesterol diet     Patient was given instructions and counseling regarding his condition or for health maintenance advice. Please see specific information pulled into the AVS if appropriate.     SHANT Watts

## 2022-03-01 ENCOUNTER — TELEPHONE (OUTPATIENT)
Dept: FAMILY MEDICINE CLINIC | Facility: CLINIC | Age: 62
End: 2022-03-01

## 2022-03-01 NOTE — TELEPHONE ENCOUNTER
Caller: Nguyễn Santana    Relationship: Self    Best call back number: [phone]     What is the best time to reach you: ANY TIME    Who are you requesting to speak with (clinical staff, provider,  specific staff member): CLINICAL STAFF    What was the call regarding: PATIENT CALLED TO CHECK ON THE STATUS OF HIS DISABILITY PAPERWORK THAT HE DROPPED OFF ABOUT 2 WEEKS AGO. HE HAS NOT HEARD ANYTHING BACK FROM THE OFFICE AND WANTS TO CHECK ON THE STATUS OF THE PAPERWORK.    PLEASE ADVISE    Do you require a callback: YES

## 2022-03-01 NOTE — TELEPHONE ENCOUNTER
I spoke with the patient and he is picking this paperwork up. Vanesa Bernard recommended his Pulmonary physician fill this out

## 2022-04-21 ENCOUNTER — LAB (OUTPATIENT)
Dept: ENDOCRINOLOGY | Age: 62
End: 2022-04-21

## 2022-04-21 DIAGNOSIS — E03.9 ACQUIRED HYPOTHYROIDISM: ICD-10-CM

## 2022-04-22 LAB
ALBUMIN SERPL-MCNC: 4.8 G/DL (ref 3.8–4.9)
ALBUMIN/GLOB SERPL: 2.3 {RATIO} (ref 1.2–2.2)
ALP SERPL-CCNC: 66 IU/L (ref 44–121)
ALT SERPL-CCNC: 25 IU/L (ref 0–44)
AST SERPL-CCNC: 23 IU/L (ref 0–40)
BILIRUB SERPL-MCNC: 0.8 MG/DL (ref 0–1.2)
BUN SERPL-MCNC: 22 MG/DL (ref 6–24)
BUN/CREAT SERPL: 18 (ref 9–20)
CALCIUM SERPL-MCNC: 9.3 MG/DL (ref 8.7–10.2)
CHLORIDE SERPL-SCNC: 100 MMOL/L (ref 96–106)
CHOLEST SERPL-MCNC: 244 MG/DL (ref 100–199)
CO2 SERPL-SCNC: 21 MMOL/L (ref 20–29)
CREAT SERPL-MCNC: 1.25 MG/DL (ref 0.76–1.27)
EGFRCR SERPLBLD CKD-EPI 2021: 66 ML/MIN/1.73
GLOBULIN SER CALC-MCNC: 2.1 G/DL (ref 1.5–4.5)
GLUCOSE SERPL-MCNC: 75 MG/DL (ref 65–99)
HDLC SERPL-MCNC: 71 MG/DL
IMP & REVIEW OF LAB RESULTS: NORMAL
LDLC SERPL CALC-MCNC: 146 MG/DL (ref 0–99)
POTASSIUM SERPL-SCNC: 4.4 MMOL/L (ref 3.5–5.2)
PROT SERPL-MCNC: 6.9 G/DL (ref 6–8.5)
SODIUM SERPL-SCNC: 138 MMOL/L (ref 134–144)
T3FREE SERPL-MCNC: 2.3 PG/ML (ref 2–4.4)
T4 FREE SERPL-MCNC: 2.28 NG/DL (ref 0.82–1.77)
TRIGL SERPL-MCNC: 154 MG/DL (ref 0–149)
TSH SERPL DL<=0.005 MIU/L-ACNC: 0.63 UIU/ML (ref 0.45–4.5)
VLDLC SERPL CALC-MCNC: 27 MG/DL (ref 5–40)

## 2022-05-05 ENCOUNTER — OFFICE VISIT (OUTPATIENT)
Dept: ENDOCRINOLOGY | Age: 62
End: 2022-05-05

## 2022-05-05 VITALS
HEIGHT: 69 IN | HEART RATE: 79 BPM | DIASTOLIC BLOOD PRESSURE: 68 MMHG | WEIGHT: 179.8 LBS | SYSTOLIC BLOOD PRESSURE: 127 MMHG | BODY MASS INDEX: 26.63 KG/M2 | OXYGEN SATURATION: 98 %

## 2022-05-05 DIAGNOSIS — E55.9 VITAMIN D DEFICIENCY: ICD-10-CM

## 2022-05-05 DIAGNOSIS — E78.5 DYSLIPIDEMIA: ICD-10-CM

## 2022-05-05 DIAGNOSIS — E03.9 ACQUIRED HYPOTHYROIDISM: Primary | ICD-10-CM

## 2022-05-05 PROCEDURE — 99214 OFFICE O/P EST MOD 30 MIN: CPT | Performed by: INTERNAL MEDICINE

## 2022-05-05 RX ORDER — PITAVASTATIN CALCIUM 4.18 MG/1
TABLET, FILM COATED ORAL
Qty: 90 TABLET | Refills: 2 | Status: SHIPPED | OUTPATIENT
Start: 2022-05-05 | End: 2022-12-12

## 2022-05-05 RX ORDER — LEVOTHYROXINE SODIUM 112 MCG
112 TABLET ORAL DAILY
Qty: 30 TABLET | Refills: 11 | Status: SHIPPED | OUTPATIENT
Start: 2022-05-05 | End: 2022-08-10

## 2022-05-05 RX ORDER — ERGOCALCIFEROL 1.25 MG/1
50000 CAPSULE ORAL
Qty: 13 CAPSULE | Refills: 3 | Status: SHIPPED | OUTPATIENT
Start: 2022-05-05 | End: 2022-12-12

## 2022-05-05 NOTE — PROGRESS NOTES
"Chief Complaint  Chief Complaint   Patient presents with   • Acquired hypothyroidism       Subjective          History of Present Illness    Nguyễn Santana 61 y.o. presents as a F/u hypothyroidism.    Patient used to see Dr. West in the past, transferred the care to me today.    Today in clinic patient reports that his hypothyroidism was diagnosed in 2014.  Currently on Synthroid 125 mcg oral daily.  His energy levels are low, reports that this is also limited to his COPD.  Weight has been more or less stable.  No tremors, racing of heart.      Pt is on prednisone for COPD and also on oxygen at bed time.    Hyperlipidemia  On Livalo 4 mg oral daily    Reviewed primary care physician's/consulting physician documentation and lab results         I have reviewed the patient's allergies, medicines, past medical hx, family hx and social hx in detail.    Objective   Vital Signs:   /68   Pulse 79   Ht 175.3 cm (69\")   Wt 81.6 kg (179 lb 12.8 oz)   SpO2 98%   BMI 26.55 kg/m²   Physical Exam   General appearance - no distress  Eyes- anicteric sclera  Ear nose and throat-external ears and nose normal.    Respiratory-normal chest on inspection.  No respiratory distress noted.  Skin-no rashes.  Neuro-alert and oriented x3          Result Review :   The following data was reviewed by: Kelechi Parker MD on 05/05/2022:  Lab on 04/21/2022   Component Date Value Ref Range Status   • Total Cholesterol 04/21/2022 244 (A) 100 - 199 mg/dL Final   • Triglycerides 04/21/2022 154 (A) 0 - 149 mg/dL Final   • HDL Cholesterol 04/21/2022 71  >39 mg/dL Final   • VLDL Cholesterol Ricky 04/21/2022 27  5 - 40 mg/dL Final   • LDL Chol Calc ([facility]) 04/21/2022 146 (A) 0 - 99 mg/dL Final   • Glucose 04/21/2022 75  65 - 99 mg/dL Final   • BUN 04/21/2022 22  6 - 24 mg/dL Final   • Creatinine 04/21/2022 1.25  0.76 - 1.27 mg/dL Final   • EGFR Result 04/21/2022 66  >59 mL/min/1.73 Final   • BUN/Creatinine Ratio 04/21/2022 18  9 - 20 Final   • " Sodium 04/21/2022 138  134 - 144 mmol/L Final   • Potassium 04/21/2022 4.4  3.5 - 5.2 mmol/L Final   • Chloride 04/21/2022 100  96 - 106 mmol/L Final   • Total CO2 04/21/2022 21  20 - 29 mmol/L Final   • Calcium 04/21/2022 9.3  8.7 - 10.2 mg/dL Final   • Total Protein 04/21/2022 6.9  6.0 - 8.5 g/dL Final   • Albumin 04/21/2022 4.8  3.8 - 4.9 g/dL Final   • Globulin 04/21/2022 2.1  1.5 - 4.5 g/dL Final   • A/G Ratio 04/21/2022 2.3 (A) 1.2 - 2.2 Final   • Total Bilirubin 04/21/2022 0.8  0.0 - 1.2 mg/dL Final   • Alkaline Phosphatase 04/21/2022 66  44 - 121 IU/L Final   • AST (SGOT) 04/21/2022 23  0 - 40 IU/L Final   • ALT (SGPT) 04/21/2022 25  0 - 44 IU/L Final   • T3, Free 04/21/2022 2.3  2.0 - 4.4 pg/mL Final   • Free T4 04/21/2022 2.28 (A) 0.82 - 1.77 ng/dL Final   • TSH 04/21/2022 0.630  0.450 - 4.500 uIU/mL Final   • Interpretation 04/21/2022 Note   Final    Supplemental report is available.     Data reviewed: Dr. West documentation       Results Review:    I reviewed the patient's new clinical results.     Assessment and Plan    Problem List Items Addressed This Visit        Other    Dyslipidemia    Hypothyroidism - Primary    Overview     Impression: 05/05/2015 - May 5, 2015 discovered;            Vitamin D deficiency        Hypothyroidism  Change Synthroid to 112 mcg oral daily  Free T4 could be elevated due to the chronic prednisone usage.    Vitamin D deficiency  Continue vitamin D replacement.    Hyperlipidemia  Continue Livalo 4 mg oral daily.    Patient was seen by Leonie nurse practitioner prior to me.  All the above problems are new for me    Interpreted the blood work-up/imaging results performed by the primary care/consulting physician -    Refills sent to pharmacy    Follow Up     Patient was given instructions and counseling regarding her condition or for health maintenance advice. Please see specific information pulled into the AVS if appropriate.       Thank you for asking me to see your  "patient, Nguyễn Santana in consultation.         Kelechi Parker MD  05/05/22      EMR Dragon / transcription disclaimer:     \"Dictated utilizing Dragon dictation\".              "

## 2022-07-06 DIAGNOSIS — I10 HYPERTENSION, UNSPECIFIED TYPE: ICD-10-CM

## 2022-07-06 RX ORDER — LISINOPRIL 5 MG/1
5 TABLET ORAL DAILY
Qty: 90 TABLET | Refills: 1 | Status: SHIPPED | OUTPATIENT
Start: 2022-07-06 | End: 2022-12-19 | Stop reason: SDUPTHER

## 2022-07-06 NOTE — TELEPHONE ENCOUNTER
Caller: Nguyễn Santana    Relationship: Self    Best call back number:[phone]    Requested Prescriptions:   Requested Prescriptions     Pending Prescriptions Disp Refills   • lisinopril (PRINIVIL,ZESTRIL) 5 MG tablet 90 tablet 1     Sig: Take 1 tablet by mouth Daily. Patient needs a appointment before next refill.        Pharmacy where request should be sent: [address] [phone] [facility] [phone]      Additional details provided by patient: OUT OF MEDICATION     Does the patient have less than a 3 day supply:  [x] Yes  [] No    Samir Briones   07/06/22 08:55 EDT          Terbinafine Counseling: Patient counseling regarding adverse effects of terbinafine including but not limited to headache, diarrhea, rash, upset stomach, liver function test abnormalities, itching, taste/smell disturbance, nausea, abdominal pain, and flatulence.  There is a rare possibility of liver failure that can occur when taking terbinafine.  The patient understands that a baseline LFT and kidney function test may be required. The patient verbalized understanding of the proper use and possible adverse effects of terbinafine.  All of the patient's questions and concerns were addressed.

## 2022-08-10 RX ORDER — LEVOTHYROXINE SODIUM 100 MCG
TABLET ORAL
Qty: 30 TABLET | Refills: 11 | Status: SHIPPED | OUTPATIENT
Start: 2022-08-10 | End: 2022-12-12 | Stop reason: SDUPTHER

## 2022-08-10 RX ORDER — LEVOTHYROXINE SODIUM 112 MCG
TABLET ORAL
Qty: 30 TABLET | Refills: 11 | Status: SHIPPED | OUTPATIENT
Start: 2022-08-10 | End: 2022-12-12

## 2022-08-30 ENCOUNTER — OFFICE VISIT (OUTPATIENT)
Dept: FAMILY MEDICINE CLINIC | Facility: CLINIC | Age: 62
End: 2022-08-30

## 2022-08-30 VITALS
BODY MASS INDEX: 26.36 KG/M2 | DIASTOLIC BLOOD PRESSURE: 70 MMHG | HEART RATE: 75 BPM | HEIGHT: 69 IN | RESPIRATION RATE: 16 BRPM | TEMPERATURE: 97.8 F | SYSTOLIC BLOOD PRESSURE: 130 MMHG | WEIGHT: 178 LBS | OXYGEN SATURATION: 97 %

## 2022-08-30 DIAGNOSIS — F41.9 ANXIETY: ICD-10-CM

## 2022-08-30 DIAGNOSIS — K90.49 GASTROINTESTINAL INTOLERANCE TO FOODS: ICD-10-CM

## 2022-08-30 DIAGNOSIS — R07.89 OTHER CHEST PAIN: ICD-10-CM

## 2022-08-30 DIAGNOSIS — R10.12 LEFT UPPER QUADRANT PAIN: ICD-10-CM

## 2022-08-30 DIAGNOSIS — D72.829 LEUKOCYTOSIS, UNSPECIFIED TYPE: ICD-10-CM

## 2022-08-30 DIAGNOSIS — G62.9 NEUROPATHY: ICD-10-CM

## 2022-08-30 DIAGNOSIS — I10 HYPERTENSION, UNSPECIFIED TYPE: Primary | ICD-10-CM

## 2022-08-30 DIAGNOSIS — D75.89 MACROCYTOSIS: ICD-10-CM

## 2022-08-30 DIAGNOSIS — R10.13 EPIGASTRIC PAIN: ICD-10-CM

## 2022-08-30 DIAGNOSIS — R20.2 PARESTHESIAS: ICD-10-CM

## 2022-08-30 PROCEDURE — 99214 OFFICE O/P EST MOD 30 MIN: CPT | Performed by: PHYSICIAN ASSISTANT

## 2022-08-30 RX ORDER — FAMOTIDINE 20 MG/1
20 TABLET, FILM COATED ORAL 2 TIMES DAILY
Qty: 60 TABLET | Refills: 0 | Status: SHIPPED | OUTPATIENT
Start: 2022-08-30 | End: 2022-09-27 | Stop reason: SDUPTHER

## 2022-09-01 LAB
ALBUMIN SERPL-MCNC: 5 G/DL (ref 3.8–4.9)
ALBUMIN/GLOB SERPL: 2.5 {RATIO} (ref 1.2–2.2)
ALP SERPL-CCNC: 68 IU/L (ref 44–121)
ALT SERPL-CCNC: 23 IU/L (ref 0–44)
AMYLASE SERPL-CCNC: 120 U/L (ref 31–110)
AST SERPL-CCNC: 21 IU/L (ref 0–40)
BASOPHILS # BLD AUTO: 0.1 X10E3/UL (ref 0–0.2)
BASOPHILS NFR BLD AUTO: 1 %
BILIRUB SERPL-MCNC: 0.7 MG/DL (ref 0–1.2)
BUN SERPL-MCNC: 16 MG/DL (ref 6–24)
BUN/CREAT SERPL: 16 (ref 9–20)
CALCIUM SERPL-MCNC: 9.8 MG/DL (ref 8.7–10.2)
CHLORIDE SERPL-SCNC: 101 MMOL/L (ref 96–106)
CO2 SERPL-SCNC: 22 MMOL/L (ref 20–29)
CREAT SERPL-MCNC: 1.02 MG/DL (ref 0.76–1.27)
EGFRCR-CYS SERPLBLD CKD-EPI 2021: 85 ML/MIN/1.73
ENDOMYSIUM IGA SER QL: NEGATIVE
EOSINOPHIL # BLD AUTO: 0.1 X10E3/UL (ref 0–0.4)
EOSINOPHIL NFR BLD AUTO: 1 %
ERYTHROCYTE [DISTWIDTH] IN BLOOD BY AUTOMATED COUNT: 13.8 % (ref 11.6–15.4)
GLIADIN PEPTIDE IGA SER-ACNC: 9 UNITS (ref 0–19)
GLIADIN PEPTIDE IGG SER-ACNC: 3 UNITS (ref 0–19)
GLOBULIN SER CALC-MCNC: 2 G/DL (ref 1.5–4.5)
GLUCOSE SERPL-MCNC: 80 MG/DL (ref 65–99)
HCT VFR BLD AUTO: 46.5 % (ref 37.5–51)
HGB BLD-MCNC: 15.7 G/DL (ref 13–17.7)
IGA SERPL-MCNC: 208 MG/DL (ref 90–386)
IMM GRANULOCYTES # BLD AUTO: 0.2 X10E3/UL (ref 0–0.1)
IMM GRANULOCYTES NFR BLD AUTO: 2 %
LIPASE SERPL-CCNC: 100 U/L (ref 13–78)
LYMPHOCYTES # BLD AUTO: 2.4 X10E3/UL (ref 0.7–3.1)
LYMPHOCYTES NFR BLD AUTO: 24 %
MCH RBC QN AUTO: 31.9 PG (ref 26.6–33)
MCHC RBC AUTO-ENTMCNC: 33.8 G/DL (ref 31.5–35.7)
MCV RBC AUTO: 95 FL (ref 79–97)
MONOCYTES # BLD AUTO: 1.1 X10E3/UL (ref 0.1–0.9)
MONOCYTES NFR BLD AUTO: 11 %
NEUTROPHILS # BLD AUTO: 6 X10E3/UL (ref 1.4–7)
NEUTROPHILS NFR BLD AUTO: 61 %
PLATELET # BLD AUTO: 293 X10E3/UL (ref 150–450)
POTASSIUM SERPL-SCNC: 4.4 MMOL/L (ref 3.5–5.2)
PROT SERPL-MCNC: 7 G/DL (ref 6–8.5)
RBC # BLD AUTO: 4.92 X10E6/UL (ref 4.14–5.8)
SODIUM SERPL-SCNC: 140 MMOL/L (ref 134–144)
TTG IGA SER-ACNC: <2 U/ML (ref 0–3)
TTG IGG SER-ACNC: <2 U/ML (ref 0–5)
UNABLE TO VOID: NORMAL
UREA BREATH TEST QL: NEGATIVE
WBC # BLD AUTO: 9.8 X10E3/UL (ref 3.4–10.8)

## 2022-09-13 ENCOUNTER — HOSPITAL ENCOUNTER (OUTPATIENT)
Dept: ULTRASOUND IMAGING | Facility: HOSPITAL | Age: 62
Discharge: HOME | End: 2022-09-13
Admitting: PHYSICIAN ASSISTANT

## 2022-09-13 DIAGNOSIS — R10.13 EPIGASTRIC PAIN: ICD-10-CM

## 2022-09-13 DIAGNOSIS — R10.12 LEFT UPPER QUADRANT PAIN: ICD-10-CM

## 2022-09-13 DIAGNOSIS — K90.49 GASTROINTESTINAL INTOLERANCE TO FOODS: ICD-10-CM

## 2022-09-13 PROCEDURE — 76705 ECHO EXAM OF ABDOMEN: CPT

## 2022-09-17 ENCOUNTER — HOSPITAL ENCOUNTER (OUTPATIENT)
Dept: CT IMAGING | Facility: HOSPITAL | Age: 62
Discharge: HOME | End: 2022-09-17
Admitting: PHYSICIAN ASSISTANT

## 2022-09-17 DIAGNOSIS — R10.13 EPIGASTRIC PAIN: ICD-10-CM

## 2022-09-17 DIAGNOSIS — R10.12 LEFT UPPER QUADRANT PAIN: ICD-10-CM

## 2022-09-17 DIAGNOSIS — K90.49 GASTROINTESTINAL INTOLERANCE TO FOODS: ICD-10-CM

## 2022-09-17 PROCEDURE — 0 DIATRIZOATE MEGLUMINE & SODIUM PER 1 ML: Performed by: PHYSICIAN ASSISTANT

## 2022-09-17 PROCEDURE — 25010000002 IOPAMIDOL 61 % SOLUTION: Performed by: PHYSICIAN ASSISTANT

## 2022-09-17 PROCEDURE — 82565 ASSAY OF CREATININE: CPT

## 2022-09-17 PROCEDURE — 74177 CT ABD & PELVIS W/CONTRAST: CPT

## 2022-09-17 RX ADMIN — DIATRIZOATE MEGLUMINE AND DIATRIZOATE SODIUM 30 ML: 600; 100 SOLUTION ORAL; RECTAL at 17:45

## 2022-09-17 RX ADMIN — IOPAMIDOL 85 ML: 612 INJECTION, SOLUTION INTRAVENOUS at 18:53

## 2022-09-19 LAB — CREAT BLDA-MCNC: 1.2 MG/DL (ref 0.6–1.3)

## 2022-09-20 ENCOUNTER — HOSPITAL ENCOUNTER (OUTPATIENT)
Dept: NUCLEAR MEDICINE | Facility: HOSPITAL | Age: 62
Discharge: HOME | End: 2022-09-20

## 2022-09-20 DIAGNOSIS — R10.12 LEFT UPPER QUADRANT PAIN: ICD-10-CM

## 2022-09-20 DIAGNOSIS — K90.49 GASTROINTESTINAL INTOLERANCE TO FOODS: ICD-10-CM

## 2022-09-20 DIAGNOSIS — R10.13 EPIGASTRIC PAIN: ICD-10-CM

## 2022-09-20 PROCEDURE — 25010000002 SINCALIDE PER 5 MCG: Performed by: PHYSICIAN ASSISTANT

## 2022-09-20 PROCEDURE — A9537 TC99M MEBROFENIN: HCPCS | Performed by: PHYSICIAN ASSISTANT

## 2022-09-20 PROCEDURE — 78227 HEPATOBIL SYST IMAGE W/DRUG: CPT

## 2022-09-20 PROCEDURE — 0 TECHNETIUM TC 99M MEBROFENIN KIT: Performed by: PHYSICIAN ASSISTANT

## 2022-09-20 RX ORDER — KIT FOR THE PREPARATION OF TECHNETIUM TC 99M MEBROFENIN 45 MG/10ML
1 INJECTION, POWDER, LYOPHILIZED, FOR SOLUTION INTRAVENOUS
Status: COMPLETED | OUTPATIENT
Start: 2022-09-20 | End: 2022-09-20

## 2022-09-20 RX ADMIN — MEBROFENIN 1 DOSE: 45 INJECTION, POWDER, LYOPHILIZED, FOR SOLUTION INTRAVENOUS at 10:03

## 2022-09-20 RX ADMIN — SODIUM CHLORIDE 1.6 MCG: 9 INJECTION, SOLUTION INTRAVENOUS at 11:16

## 2022-09-27 DIAGNOSIS — K90.49 GASTROINTESTINAL INTOLERANCE TO FOODS: ICD-10-CM

## 2022-09-27 DIAGNOSIS — R10.13 EPIGASTRIC PAIN: ICD-10-CM

## 2022-09-27 DIAGNOSIS — R10.12 LEFT UPPER QUADRANT PAIN: ICD-10-CM

## 2022-09-27 RX ORDER — FAMOTIDINE 20 MG/1
20 TABLET, FILM COATED ORAL 2 TIMES DAILY
Qty: 60 TABLET | Refills: 1 | Status: SHIPPED | OUTPATIENT
Start: 2022-09-27 | End: 2022-10-11

## 2022-10-11 ENCOUNTER — PREP FOR SURGERY (OUTPATIENT)
Dept: SURGERY | Facility: SURGERY CENTER | Age: 62
End: 2022-10-11

## 2022-10-11 ENCOUNTER — OFFICE VISIT (OUTPATIENT)
Dept: GASTROENTEROLOGY | Facility: CLINIC | Age: 62
End: 2022-10-11

## 2022-10-11 VITALS
DIASTOLIC BLOOD PRESSURE: 78 MMHG | WEIGHT: 175.9 LBS | HEIGHT: 69 IN | HEART RATE: 75 BPM | SYSTOLIC BLOOD PRESSURE: 142 MMHG | TEMPERATURE: 98.4 F | BODY MASS INDEX: 26.05 KG/M2 | OXYGEN SATURATION: 94 %

## 2022-10-11 DIAGNOSIS — R10.13 EPIGASTRIC PAIN: ICD-10-CM

## 2022-10-11 DIAGNOSIS — K21.9 GASTROESOPHAGEAL REFLUX DISEASE, UNSPECIFIED WHETHER ESOPHAGITIS PRESENT: Primary | ICD-10-CM

## 2022-10-11 DIAGNOSIS — R68.81 EARLY SATIETY: ICD-10-CM

## 2022-10-11 DIAGNOSIS — Z80.0 FAMILY HISTORY OF COLON CANCER IN MOTHER: ICD-10-CM

## 2022-10-11 DIAGNOSIS — K21.9 GASTROESOPHAGEAL REFLUX DISEASE, UNSPECIFIED WHETHER ESOPHAGITIS PRESENT: ICD-10-CM

## 2022-10-11 DIAGNOSIS — R10.12 LEFT UPPER QUADRANT ABDOMINAL PAIN: ICD-10-CM

## 2022-10-11 DIAGNOSIS — R14.0 BLOATING: ICD-10-CM

## 2022-10-11 DIAGNOSIS — Z12.11 ENCOUNTER FOR SCREENING COLONOSCOPY: ICD-10-CM

## 2022-10-11 DIAGNOSIS — R10.13 EPIGASTRIC PAIN: Primary | ICD-10-CM

## 2022-10-11 PROCEDURE — 99204 OFFICE O/P NEW MOD 45 MIN: CPT | Performed by: INTERNAL MEDICINE

## 2022-10-11 RX ORDER — PANTOPRAZOLE SODIUM 40 MG/1
40 TABLET, DELAYED RELEASE ORAL DAILY
Qty: 90 TABLET | Refills: 3 | Status: SHIPPED | OUTPATIENT
Start: 2022-10-11 | End: 2023-01-16

## 2022-10-11 RX ORDER — SODIUM CHLORIDE 0.9 % (FLUSH) 0.9 %
3 SYRINGE (ML) INJECTION EVERY 12 HOURS SCHEDULED
Status: CANCELLED | OUTPATIENT
Start: 2022-10-11

## 2022-10-11 RX ORDER — SODIUM CHLORIDE, SODIUM LACTATE, POTASSIUM CHLORIDE, CALCIUM CHLORIDE 600; 310; 30; 20 MG/100ML; MG/100ML; MG/100ML; MG/100ML
30 INJECTION, SOLUTION INTRAVENOUS CONTINUOUS PRN
Status: CANCELLED | OUTPATIENT
Start: 2022-10-11

## 2022-10-11 RX ORDER — SODIUM CHLORIDE 0.9 % (FLUSH) 0.9 %
10 SYRINGE (ML) INJECTION AS NEEDED
Status: CANCELLED | OUTPATIENT
Start: 2022-10-11

## 2022-10-11 NOTE — PATIENT INSTRUCTIONS
Schedule EGD and colonoscopy, orders placed     For GERD:    Begin taking Pantoprazole 40 mg once a day 30-60 minutes prior to breakfast     To see if a slow gastric emptying is a cause to your GI symptoms:    We recommend assessing a a Gastric Emptying Study  2. The test itself will take approximately 4 hours  3. To begin the test you will consume eggs (or oatmeal if egg allergy is present) with X-rays taken of your abdomen after one hour, two hours, three hours, and Four hours of egg consumption.        Scheduling of your Ordered Diagnostic Tests :    A team member from [facility] will contact you within the next 3-5 business days to schedule your tests.  If you have not heard them within this time frame, they can be contacted at [phone]

## 2022-10-11 NOTE — PROGRESS NOTES
"Chief Complaint   Patient presents with   • Abdominal Pain   • Heartburn           History of Present Illness  Patient for consultation regarding new and unexplained epigastric and left upper quadrant pain.  He had an EGD and colonoscopy back in 2012 and biopsies were negative for celiac disease and polyps were only hyperplastic.  He is due for screening colonoscopy.  CT of the abdomen done in September of this year was unremarkable. :He has bloating with any meals. He has early satiety. Mother with colon cancer.        Result Review :        COLONOSCOPY (05/26/2012)  CT Abdomen Pelvis With Contrast (09/17/2022 18:55)  Converted Surgical Pathology (05/26/2012 07:33)      Vital Signs:   /78   Pulse 75   Temp 98.4 °F (36.9 °C)   Ht 175.3 cm (69\")   Wt 79.8 kg (175 lb 14.4 oz)   SpO2 94%   BMI 25.98 kg/m²     Body mass index is 25.98 kg/m².     Physical Exam  Vitals reviewed.   Constitutional:       Appearance: Normal appearance.   Abdominal:      General: Bowel sounds are normal. There is no distension.      Palpations: Abdomen is soft. Abdomen is not rigid. There is no mass or pulsatile mass.      Tenderness: There is no abdominal tenderness. There is no guarding or rebound.           Assessment and Plan    Diagnoses and all orders for this visit:    1. Epigastric pain (Primary)  -     pantoprazole (PROTONIX) 40 MG EC tablet; Take 1 tablet by mouth Daily. Take 30-60 Minutes prior to eating Breakfast  Dispense: 90 tablet; Refill: 3    2. Encounter for screening colonoscopy    3. Left upper quadrant abdominal pain    4. Bloating  -     NM Gastric Emptying; Future    5. Gastroesophageal reflux disease, unspecified whether esophagitis present  -     pantoprazole (PROTONIX) 40 MG EC tablet; Take 1 tablet by mouth Daily. Take 30-60 Minutes prior to eating Breakfast  Dispense: 90 tablet; Refill: 3    6. Early satiety  -     NM Gastric Emptying; Future         BRIEF SUMMARY  We will proceed with EGD due to " unexplained epigastric pain and a GES due to early satiety. Will need colonoscopy due to family history.    I have reviewed and confirmed the accuracy of the HPI and Assessment and Plan as documented by the SHANT Clifton        Follow Up   No follow-ups on file.    Patient Instructions   Schedule EGD and colonoscopy, orders placed     For GERD:    Begin taking Pantoprazole 40 mg once a day 30-60 minutes prior to breakfast     To see if a slow gastric emptying is a cause to your GI symptoms:    1. We recommend assessing a a Gastric Emptying Study  2. The test itself will take approximately 4 hours  3. To begin the test you will consume eggs (or oatmeal if egg allergy is present) with X-rays taken of your abdomen after one hour, two hours, three hours, and Four hours of egg consumption.        Scheduling of your Ordered Diagnostic Tests :    A team member [facility] will contact you within the next 3-5 business days to schedule your tests.  If you have not heard them within this time frame, they can be contacted at [phone]

## 2022-11-01 ENCOUNTER — TELEPHONE (OUTPATIENT)
Dept: ENDOCRINOLOGY | Age: 62
End: 2022-11-01

## 2022-11-01 DIAGNOSIS — R73.01 IMPAIRED FASTING BLOOD SUGAR: ICD-10-CM

## 2022-11-01 DIAGNOSIS — E78.5 DYSLIPIDEMIA: ICD-10-CM

## 2022-11-01 DIAGNOSIS — E03.9 ACQUIRED HYPOTHYROIDISM: Primary | ICD-10-CM

## 2022-12-07 ENCOUNTER — TELEPHONE (OUTPATIENT)
Dept: GASTROENTEROLOGY | Facility: CLINIC | Age: 62
End: 2022-12-07

## 2022-12-07 NOTE — TELEPHONE ENCOUNTER
Spoke to the and he has an appointment to see his pulmonologist this afternoon. He has clearance but just needed to have the appointment 1 week prior to the procedure

## 2022-12-12 ENCOUNTER — OFFICE VISIT (OUTPATIENT)
Dept: ENDOCRINOLOGY | Age: 62
End: 2022-12-12

## 2022-12-12 VITALS
TEMPERATURE: 98.4 F | WEIGHT: 179.2 LBS | BODY MASS INDEX: 26.54 KG/M2 | OXYGEN SATURATION: 95 % | DIASTOLIC BLOOD PRESSURE: 70 MMHG | SYSTOLIC BLOOD PRESSURE: 120 MMHG | HEIGHT: 69 IN | HEART RATE: 87 BPM

## 2022-12-12 DIAGNOSIS — E03.9 ACQUIRED HYPOTHYROIDISM: Primary | ICD-10-CM

## 2022-12-12 DIAGNOSIS — E78.5 DYSLIPIDEMIA: ICD-10-CM

## 2022-12-12 DIAGNOSIS — E55.9 VITAMIN D DEFICIENCY: ICD-10-CM

## 2022-12-12 PROCEDURE — 99214 OFFICE O/P EST MOD 30 MIN: CPT | Performed by: INTERNAL MEDICINE

## 2022-12-12 RX ORDER — LEVOTHYROXINE SODIUM 100 MCG
TABLET ORAL
Qty: 30 TABLET | Refills: 11 | Status: SHIPPED | OUTPATIENT
Start: 2022-12-12

## 2022-12-12 RX ORDER — BUDESONIDE, GLYCOPYRROLATE, AND FORMOTEROL FUMARATE 160; 9; 4.8 UG/1; UG/1; UG/1
AEROSOL, METERED RESPIRATORY (INHALATION)
COMMUNITY
Start: 2022-11-07 | End: 2023-01-16

## 2022-12-12 RX ORDER — LOVASTATIN 10 MG/1
10 TABLET ORAL NIGHTLY
Qty: 90 TABLET | Refills: 3 | Status: SHIPPED | OUTPATIENT
Start: 2022-12-12 | End: 2023-01-16

## 2022-12-12 NOTE — PROGRESS NOTES
"Chief Complaint  Chief Complaint   Patient presents with   • Acquired hypothyroidism     Pt states that energy levels have been good, weight fluctuates, does have family hx of thyroid disease.        Subjective          History of Present Illness    Nguyễn Santana 62 y.o. presents as a F/u     Patient used to see Dr. West in the past, transferred the care to me today.     Today in clinic patient reports that his hypothyroidism was diagnosed in 2014.  Currently on Synthroid 100 mcg for 5 days a week and 112 mcg for 2 days a week.   His energy levels are low, reports that this is also limited to his COPD.  Weight has been more or less stable. Still C/o cold hands and feet to the point that it hurts him.   No tremors, racing of heart.        Pt is on prednisone for COPD and also on oxygen at bed time.     Hyperlipidemia  On Livalo 4 mg oral daily - got a letter saying its not longer covered.     Reviewed primary care physician's/consulting physician documentation and lab results         I have reviewed the patient's allergies, medicines, past medical hx, family hx and social hx in detail.    Objective   Vital Signs:   /70   Pulse 87   Temp 98.4 °F (36.9 °C) (Temporal)   Ht 175.3 cm (69.02\")   Wt 81.3 kg (179 lb 3.2 oz)   SpO2 95%   BMI 26.45 kg/m²   Physical Exam   General appearance - no distress  Eyes- anicteric sclera  Ear nose and throat-external ears and nose normal.    Respiratory-normal chest on inspection.  No respiratory distress noted.  Skin-no rashes.  Neuro-alert and oriented x3            Result Review :   The following data was reviewed by: Kelechi Parker MD on 12/12/2022:  Results Encounter on 11/28/2022   Component Date Value Ref Range Status   • Free T4 11/28/2022 1.82 (H)  0.93 - 1.70 ng/dL Final    Results may be falsely increased if patient taking Biotin.   • TSH 11/28/2022 0.664  0.270 - 4.200 uIU/mL Final   • Glucose 11/28/2022 67  65 - 99 mg/dL Final   • BUN 11/28/2022 13  8 - " 23 mg/dL Final   • Creatinine 11/28/2022 1.15  0.76 - 1.27 mg/dL Final   • EGFR Result 11/28/2022 72.9  >60.0 mL/min/1.73 Final    Comment: National Kidney Foundation and American Society of  Nephrology (ASN) Task Force recommended calculation based  on the Chronic Kidney Disease Epidemiology Collaboration  (CKD-EPI) equation refit without adjustment for race.  GFR Normal >60  Chronic Kidney Disease <60  Kidney Failure <15     • BUN/Creatinine Ratio 11/28/2022 11.3  7.0 - 25.0 Final   • Sodium 11/28/2022 140  136 - 145 mmol/L Final   • Potassium 11/28/2022 4.9  3.5 - 5.2 mmol/L Final   • Chloride 11/28/2022 102  98 - 107 mmol/L Final   • Total CO2 11/28/2022 26.2  22.0 - 29.0 mmol/L Final   • Calcium 11/28/2022 10.1  8.6 - 10.5 mg/dL Final   • Hemoglobin A1C 11/28/2022 5.30  4.80 - 5.60 % Final    Comment: Hemoglobin A1C Ranges:  Increased Risk for Diabetes  5.7% to 6.4%  Diabetes                     >= 6.5%  Diabetic Goal                < 7.0%     • Total Cholesterol 11/28/2022 240 (H)  0 - 200 mg/dL Final    Comment: Cholesterol Reference Ranges  (U.S. Department of Health and Human Services ATP III  Classifications)  Desirable          <200 mg/dL  Borderline High    200-239 mg/dL  High Risk          >240 mg/dL  Triglyceride Reference Ranges  (U.S. Department of Health and Human Services ATP III  Classifications)  Normal           <150 mg/dL  Borderline High  150-199 mg/dL  High             200-499 mg/dL  Very High        >500 mg/dL  HDL Reference Ranges  (U.S. Department of Health and Human Services ATP III  Classifications)  Low     <40 mg/dl (major risk factor for CHD)  High    >60 mg/dl ('negative' risk factor for CHD)  LDL Reference Ranges  (U.S. Department of Health and Human Services ATP III  Classifications)  Optimal          <100 mg/dL  Near Optimal     100-129 mg/dL  Borderline High  130-159 mg/dL  High             160-189 mg/dL  Very High        >189 mg/dL     • Triglycerides 11/28/2022 136  0 - 150  mg/dL Final   • HDL Cholesterol 11/28/2022 69 (H)  40 - 60 mg/dL Final   • VLDL Cholesterol Ricky 11/28/2022 24  5 - 40 mg/dL Final   • LDL Chol Calc (NIH) 11/28/2022 147 (H)  0 - 100 mg/dL Final   • Vitamin B-12 11/28/2022 578  211 - 946 pg/mL Final    Results may be falsely increased if patient taking Biotin.   • Folate 11/28/2022 8.03  4.78 - 24.20 ng/mL Final    Results may be falsely increased if patient taking Biotin.   • Interpretation 11/28/2022 Note   Final    Supplemental report is available.     Data reviewed: PCP and endocrine notes       Results Review:    I reviewed the patient's new clinical results.     Assessment and Plan    Problem List Items Addressed This Visit        Other    Dyslipidemia    Relevant Orders    T3, Free    T4, Free    TSH    TSH    T3, Free    T4, Free    Basic Metabolic Panel    Lipid Panel    Vitamin B12 & Folate    Hypothyroidism - Primary    Overview     Impression: 05/05/2015 - May 5, 2015 discovered;          Relevant Medications    Synthroid 100 MCG tablet    Other Relevant Orders    T3, Free    T4, Free    TSH    TSH    T3, Free    T4, Free    Basic Metabolic Panel    Lipid Panel    Vitamin B12 & Folate    Vitamin D deficiency    Relevant Orders    T3, Free    T4, Free    TSH    TSH    T3, Free    T4, Free    Basic Metabolic Panel    Lipid Panel    Vitamin B12 & Folate     Hypothyroidism-chronic problem  Change Synthroid to 100 mcg oral daily  Repeat blood work-up in 6 weeks from now.    Hyperlipidemia  Change Livalo to lovastatin  Making the change as insurance is not covering the Livalo.    Interpreted the blood work-up/imaging results performed by the primary care/consulting physician -    Refills sent to pharmacy    Follow Up     Patient was given instructions and counseling regarding her condition or for health maintenance advice. Please see specific information pulled into the AVS if appropriate.       Thank you for asking me to see your patient, Nguyễn Santana in  "consultation.         Kelechi Parker MD  12/12/22      EMR Dragon / transcription disclaimer:     \"Dictated utilizing Dragon dictation\".              "

## 2022-12-13 ENCOUNTER — ANESTHESIA EVENT (OUTPATIENT)
Dept: PERIOP | Facility: HOSPITAL | Age: 62
End: 2022-12-13

## 2022-12-14 ENCOUNTER — ANESTHESIA (OUTPATIENT)
Dept: PERIOP | Facility: HOSPITAL | Age: 62
End: 2022-12-14

## 2022-12-14 ENCOUNTER — HOSPITAL ENCOUNTER (OUTPATIENT)
Facility: HOSPITAL | Age: 62
Setting detail: HOSPITAL OUTPATIENT SURGERY
Discharge: HOME | End: 2022-12-14
Attending: INTERNAL MEDICINE | Admitting: INTERNAL MEDICINE

## 2022-12-14 VITALS
SYSTOLIC BLOOD PRESSURE: 141 MMHG | DIASTOLIC BLOOD PRESSURE: 86 MMHG | BODY MASS INDEX: 25.45 KG/M2 | TEMPERATURE: 98 F | OXYGEN SATURATION: 96 % | RESPIRATION RATE: 18 BRPM | HEART RATE: 58 BPM | WEIGHT: 172.4 LBS

## 2022-12-14 DIAGNOSIS — Z80.0 FAMILY HISTORY OF COLON CANCER IN MOTHER: ICD-10-CM

## 2022-12-14 DIAGNOSIS — R10.13 EPIGASTRIC PAIN: ICD-10-CM

## 2022-12-14 DIAGNOSIS — Z12.11 ENCOUNTER FOR SCREENING COLONOSCOPY: ICD-10-CM

## 2022-12-14 DIAGNOSIS — K21.9 GASTROESOPHAGEAL REFLUX DISEASE, UNSPECIFIED WHETHER ESOPHAGITIS PRESENT: ICD-10-CM

## 2022-12-14 PROCEDURE — 43239 EGD BIOPSY SINGLE/MULTIPLE: CPT | Performed by: INTERNAL MEDICINE

## 2022-12-14 PROCEDURE — 25010000002 PROPOFOL 200 MG/20ML EMULSION: Performed by: REGISTERED NURSE

## 2022-12-14 PROCEDURE — 45385 COLONOSCOPY W/LESION REMOVAL: CPT | Performed by: INTERNAL MEDICINE

## 2022-12-14 PROCEDURE — 88305 TISSUE EXAM BY PATHOLOGIST: CPT | Performed by: INTERNAL MEDICINE

## 2022-12-14 PROCEDURE — 87081 CULTURE SCREEN ONLY: CPT | Performed by: INTERNAL MEDICINE

## 2022-12-14 RX ORDER — SODIUM CHLORIDE 0.9 % (FLUSH) 0.9 %
10 SYRINGE (ML) INJECTION AS NEEDED
Status: DISCONTINUED | OUTPATIENT
Start: 2022-12-14 | End: 2022-12-14 | Stop reason: HOSPADM

## 2022-12-14 RX ORDER — PROPOFOL 10 MG/ML
INJECTION, EMULSION INTRAVENOUS AS NEEDED
Status: DISCONTINUED | OUTPATIENT
Start: 2022-12-14 | End: 2022-12-14 | Stop reason: SURG

## 2022-12-14 RX ORDER — DEXMEDETOMIDINE HYDROCHLORIDE 100 UG/ML
INJECTION, SOLUTION INTRAVENOUS AS NEEDED
Status: DISCONTINUED | OUTPATIENT
Start: 2022-12-14 | End: 2022-12-14 | Stop reason: SURG

## 2022-12-14 RX ORDER — SODIUM CHLORIDE, SODIUM LACTATE, POTASSIUM CHLORIDE, CALCIUM CHLORIDE 600; 310; 30; 20 MG/100ML; MG/100ML; MG/100ML; MG/100ML
30 INJECTION, SOLUTION INTRAVENOUS CONTINUOUS PRN
Status: DISCONTINUED | OUTPATIENT
Start: 2022-12-14 | End: 2022-12-14 | Stop reason: HOSPADM

## 2022-12-14 RX ORDER — SODIUM CHLORIDE, SODIUM LACTATE, POTASSIUM CHLORIDE, CALCIUM CHLORIDE 600; 310; 30; 20 MG/100ML; MG/100ML; MG/100ML; MG/100ML
9 INJECTION, SOLUTION INTRAVENOUS CONTINUOUS
Status: DISCONTINUED | OUTPATIENT
Start: 2022-12-14 | End: 2022-12-14 | Stop reason: HOSPADM

## 2022-12-14 RX ORDER — SODIUM CHLORIDE 0.9 % (FLUSH) 0.9 %
3 SYRINGE (ML) INJECTION EVERY 12 HOURS SCHEDULED
Status: DISCONTINUED | OUTPATIENT
Start: 2022-12-14 | End: 2022-12-14 | Stop reason: HOSPADM

## 2022-12-14 RX ORDER — LIDOCAINE HYDROCHLORIDE 20 MG/ML
INJECTION, SOLUTION EPIDURAL; INFILTRATION; INTRACAUDAL; PERINEURAL AS NEEDED
Status: DISCONTINUED | OUTPATIENT
Start: 2022-12-14 | End: 2022-12-14 | Stop reason: SURG

## 2022-12-14 RX ORDER — SODIUM CHLORIDE 9 MG/ML
40 INJECTION, SOLUTION INTRAVENOUS AS NEEDED
Status: DISCONTINUED | OUTPATIENT
Start: 2022-12-14 | End: 2022-12-14 | Stop reason: HOSPADM

## 2022-12-14 RX ORDER — LIDOCAINE HYDROCHLORIDE 10 MG/ML
0.5 INJECTION, SOLUTION EPIDURAL; INFILTRATION; INTRACAUDAL; PERINEURAL ONCE AS NEEDED
Status: DISCONTINUED | OUTPATIENT
Start: 2022-12-14 | End: 2022-12-14 | Stop reason: HOSPADM

## 2022-12-14 RX ORDER — SODIUM CHLORIDE 0.9 % (FLUSH) 0.9 %
10 SYRINGE (ML) INJECTION EVERY 12 HOURS SCHEDULED
Status: DISCONTINUED | OUTPATIENT
Start: 2022-12-14 | End: 2022-12-14 | Stop reason: HOSPADM

## 2022-12-14 RX ADMIN — DEXMEDETOMIDINE 4 MCG: 100 INJECTION, SOLUTION, CONCENTRATE INTRAVENOUS at 12:31

## 2022-12-14 RX ADMIN — PROPOFOL INJECTABLE EMULSION 50 MG: 10 INJECTION, EMULSION INTRAVENOUS at 12:42

## 2022-12-14 RX ADMIN — PROPOFOL INJECTABLE EMULSION 100 MG: 10 INJECTION, EMULSION INTRAVENOUS at 12:31

## 2022-12-14 RX ADMIN — PROPOFOL INJECTABLE EMULSION 150 MG: 10 INJECTION, EMULSION INTRAVENOUS at 12:45

## 2022-12-14 RX ADMIN — PROPOFOL INJECTABLE EMULSION 50 MG: 10 INJECTION, EMULSION INTRAVENOUS at 12:38

## 2022-12-14 RX ADMIN — LIDOCAINE HYDROCHLORIDE 50 MG: 20 INJECTION, SOLUTION EPIDURAL; INFILTRATION; INTRACAUDAL; PERINEURAL at 12:31

## 2022-12-14 RX ADMIN — SODIUM CHLORIDE, POTASSIUM CHLORIDE, SODIUM LACTATE AND CALCIUM CHLORIDE 9 ML/HR: 600; 310; 30; 20 INJECTION, SOLUTION INTRAVENOUS at 10:33

## 2022-12-14 NOTE — ANESTHESIA POSTPROCEDURE EVALUATION
Patient: Nguyễn Santana    Procedure Summary     Date: 12/14/22 Room / Location: [facility] ENDOSCOPY 2 /  [facility]    Anesthesia Start: 1226 Anesthesia Stop: 1256    Procedures:       ESOPHAGOGASTRODUODENOSCOPY WITH BIOPSY      COLONOSCOPY FOR SCREENING Diagnosis:       Gastroesophageal reflux disease, unspecified whether esophagitis present      Epigastric pain      Encounter for screening colonoscopy      Family history of colon cancer in mother      (Gastroesophageal reflux disease, unspecified whether esophagitis present [K21.9])      (Epigastric pain [R10.13])      (Encounter for screening colonoscopy [Z12.11])      (Family history of colon cancer in mother [Z80.0])    Surgeons: Markos Jimenez MD Provider: Demetrius Chapman CRNA    Anesthesia Type: MAC ASA Status: 3          Anesthesia Type: MAC    Vitals  Vitals Value Taken Time   /83 12/14/22 1320   Temp     Pulse 61 12/14/22 1320   Resp 15 12/14/22 1310   SpO2 96 % 12/14/22 1320           Post Anesthesia Care and Evaluation    Patient location during evaluation: bedside  Patient participation: complete - patient participated  Level of consciousness: awake and alert  Pain score: 2  Pain management: adequate    Airway patency: patent  Anesthetic complications: No anesthetic complications  PONV Status: none  Cardiovascular status: acceptable  Respiratory status: acceptable  Hydration status: acceptable  No anesthesia care post op

## 2022-12-14 NOTE — ANESTHESIA PREPROCEDURE EVALUATION
Anesthesia Evaluation     Patient summary reviewed and Nursing notes reviewed   no history of anesthetic complications (low tolerance for medications):  NPO Solid Status: > 8 hours  NPO Liquid Status: > 8 hours           Airway   Mallampati: II  TM distance: <3 FB  Neck ROM: full  No difficulty expected  Dental    (+) edentulous    Pulmonary - normal exam   (+) COPD severe, shortness of breath (Always),   Cardiovascular - normal exam  Exercise tolerance: good (4-7 METS)    ECG reviewed    (+) hypertension, hyperlipidemia,   (-) angina      Neuro/Psych  (+) psychiatric history Anxiety,    GI/Hepatic/Renal/Endo    (+)  GERD,  thyroid problem hypothyroidism    Musculoskeletal     Abdominal  - normal exam   Substance History   (+) alcohol use,      OB/GYN          Other                        Anesthesia Plan    ASA 3     MAC   total IV anesthesia  intravenous induction     Anesthetic plan, risks, benefits, and alternatives have been provided, discussed and informed consent has been obtained with: patient.    Use of blood products discussed with patient  Consented to blood products.       CODE STATUS:

## 2022-12-15 LAB
LAB AP CASE REPORT: NORMAL
PATH REPORT.FINAL DX SPEC: NORMAL
PATH REPORT.GROSS SPEC: NORMAL
UREASE TISS QL: POSITIVE

## 2022-12-16 DIAGNOSIS — A04.8 BACTERIAL INFECTION DUE TO HELICOBACTER PYLORI: Primary | ICD-10-CM

## 2022-12-16 RX ORDER — TETRACYCLINE HYDROCHLORIDE 500 MG/1
500 CAPSULE ORAL 4 TIMES DAILY
Qty: 56 CAPSULE | Refills: 0 | Status: SHIPPED | OUTPATIENT
Start: 2022-12-16 | End: 2023-01-16

## 2022-12-16 RX ORDER — METRONIDAZOLE 250 MG/1
250 TABLET ORAL 4 TIMES DAILY
Qty: 56 TABLET | Refills: 0 | Status: SHIPPED | OUTPATIENT
Start: 2022-12-16 | End: 2022-12-30

## 2022-12-16 RX ORDER — BISMUTH SUBSALICYLATE 262 MG
524 TABLET,CHEWABLE ORAL 4 TIMES DAILY
Qty: 112 TABLET | Refills: 0 | Status: SHIPPED | OUTPATIENT
Start: 2022-12-16 | End: 2023-01-16

## 2022-12-16 NOTE — TELEPHONE ENCOUNTER
Please approve Rxs for treatment of H. Pylori. Patient currently takes pantoprazole 40 mg PO daily; during the 14 days of treatment, he's going to take it twice a day. Thanks!

## 2022-12-19 DIAGNOSIS — I10 HYPERTENSION, UNSPECIFIED TYPE: ICD-10-CM

## 2022-12-19 RX ORDER — LISINOPRIL 5 MG/1
5 TABLET ORAL DAILY
Qty: 90 TABLET | Refills: 1 | Status: SHIPPED | OUTPATIENT
Start: 2022-12-19

## 2022-12-19 NOTE — TELEPHONE ENCOUNTER
Caller: Nguyễn Santana    Relationship: Self    Best call back number: [phone]    Requested Prescriptions:   Requested Prescriptions     Pending Prescriptions Disp Refills   • lisinopril (PRINIVIL,ZESTRIL) 5 MG tablet 90 tablet 1     Sig: Take 1 tablet by mouth Daily. Patient needs a appointment before next refill.        Pharmacy where request should be sent: [facility] [id number] [facility] [id number] [place] [address] AT [facility] [phone] [facility] [phone] FX     Additional details provided by patient: PATIENT HAS 6 DAY SUPPLY REMAINING.     Does the patient have less than a 3 day supply:  [] Yes  [x] No    Would you like a call back once the refill request has been completed: [] Yes [x] No    If the office needs to give you a call back, can they leave a voicemail: [] Yes [] No    Samir Baker Rep   12/19/22 09:12 EST

## 2023-01-16 ENCOUNTER — OFFICE VISIT (OUTPATIENT)
Dept: FAMILY MEDICINE CLINIC | Facility: CLINIC | Age: 63
End: 2023-01-16
Payer: MEDICARE

## 2023-01-16 VITALS
BODY MASS INDEX: 31.54 KG/M2 | RESPIRATION RATE: 16 BRPM | WEIGHT: 178 LBS | OXYGEN SATURATION: 98 % | DIASTOLIC BLOOD PRESSURE: 88 MMHG | HEART RATE: 76 BPM | SYSTOLIC BLOOD PRESSURE: 120 MMHG | HEIGHT: 63 IN | TEMPERATURE: 97 F

## 2023-01-16 DIAGNOSIS — R79.9 ABNORMAL FINDING OF BLOOD CHEMISTRY, UNSPECIFIED: ICD-10-CM

## 2023-01-16 DIAGNOSIS — R23.3 EASY BRUISING: Primary | ICD-10-CM

## 2023-01-16 PROBLEM — Z87.01 HISTORY OF PNEUMONIA: Status: ACTIVE | Noted: 2023-01-16

## 2023-01-16 PROBLEM — E04.9 GOITER: Status: ACTIVE | Noted: 2023-01-16

## 2023-01-16 PROCEDURE — 99213 OFFICE O/P EST LOW 20 MIN: CPT | Performed by: PHYSICIAN ASSISTANT

## 2023-01-18 ENCOUNTER — LAB (OUTPATIENT)
Dept: LAB | Facility: HOSPITAL | Age: 63
End: 2023-01-18
Payer: MEDICARE

## 2023-01-18 PROCEDURE — 83013 H PYLORI (C-13) BREATH: CPT

## 2023-01-19 ENCOUNTER — TELEPHONE (OUTPATIENT)
Dept: FAMILY MEDICINE CLINIC | Facility: CLINIC | Age: 63
End: 2023-01-19
Payer: MEDICARE

## 2023-01-19 NOTE — TELEPHONE ENCOUNTER
----- Message from Markos Jimenez MD sent at 1/16/2023  1:41 PM EST -----  Regarding: RE: [place] patient  Ok, we will reach out to him when we get the follow up H. Pylori testing back. Thanks!  ----- Message -----  From: Vanesa Bernard PA  Sent: 1/16/2023   1:38 PM EST  To: Markos Jimenez MD  Subject: [place] patient                                   Good afternoon,     This patient is seeing me in follow up. I was reviewing his testing, results and recommendations. From what I understand from your message, he was to take the treatment for H Pylori, follow up with the nurse practitioner in 4 weeks and repeat testing for H pylori in 8 weeks.     He does not have appt for follow up with APRN in your office and reports he is going to have repeat testing tomorrow for H pylori. I wanted to ensure I was giving him the correct recommendations.     Thank you,     Vanesa Bernard PA-C

## 2023-01-23 ENCOUNTER — LAB (OUTPATIENT)
Dept: ENDOCRINOLOGY | Age: 63
End: 2023-01-23
Payer: MEDICARE

## 2023-01-23 DIAGNOSIS — E03.9 ACQUIRED HYPOTHYROIDISM: ICD-10-CM

## 2023-01-23 DIAGNOSIS — E78.5 DYSLIPIDEMIA: ICD-10-CM

## 2023-01-23 DIAGNOSIS — E55.9 VITAMIN D DEFICIENCY: ICD-10-CM

## 2023-01-24 LAB
BUN SERPL-MCNC: 16 MG/DL (ref 8–23)
BUN/CREAT SERPL: 14.2 (ref 7–25)
CALCIUM SERPL-MCNC: 10.2 MG/DL (ref 8.6–10.5)
CHLORIDE SERPL-SCNC: 101 MMOL/L (ref 98–107)
CHOLEST SERPL-MCNC: 251 MG/DL (ref 0–200)
CO2 SERPL-SCNC: 27.6 MMOL/L (ref 22–29)
CREAT SERPL-MCNC: 1.13 MG/DL (ref 0.76–1.27)
EGFRCR SERPLBLD CKD-EPI 2021: 74.4 ML/MIN/1.73
FOLATE SERPL-MCNC: 6.42 NG/ML (ref 4.78–24.2)
GLUCOSE SERPL-MCNC: 76 MG/DL (ref 65–99)
HDLC SERPL-MCNC: 71 MG/DL (ref 40–60)
IMP & REVIEW OF LAB RESULTS: NORMAL
LDLC SERPL CALC-MCNC: 144 MG/DL (ref 0–100)
POTASSIUM SERPL-SCNC: 4.2 MMOL/L (ref 3.5–5.2)
SODIUM SERPL-SCNC: 139 MMOL/L (ref 136–145)
T3FREE SERPL-MCNC: 2.2 PG/ML (ref 2–4.4)
T4 FREE SERPL-MCNC: 1.86 NG/DL (ref 0.93–1.7)
TRIGL SERPL-MCNC: 202 MG/DL (ref 0–150)
TSH SERPL DL<=0.005 MIU/L-ACNC: 0.92 UIU/ML (ref 0.27–4.2)
VIT B12 SERPL-MCNC: 613 PG/ML (ref 211–946)
VLDLC SERPL CALC-MCNC: 36 MG/DL (ref 5–40)

## 2023-01-25 LAB
ALBUMIN SERPL ELPH-MCNC: 3.8 G/DL (ref 2.9–4.4)
ALBUMIN/GLOB SERPL: 1.5 {RATIO} (ref 0.7–1.7)
ALPHA1 GLOB SERPL ELPH-MCNC: 0.3 G/DL (ref 0–0.4)
ALPHA2 GLOB SERPL ELPH-MCNC: 0.7 G/DL (ref 0.4–1)
APTT PPP: 31.9 SECONDS (ref 22.7–35.4)
B-GLOBULIN SERPL ELPH-MCNC: 1 G/DL (ref 0.7–1.3)
BASOPHILS # BLD AUTO: 0.07 10*3/MM3 (ref 0–0.2)
BASOPHILS NFR BLD AUTO: 0.6 % (ref 0–1.5)
EOSINOPHIL # BLD AUTO: 0.18 10*3/MM3 (ref 0–0.4)
EOSINOPHIL NFR BLD AUTO: 1.7 % (ref 0.3–6.2)
ERYTHROCYTE [DISTWIDTH] IN BLOOD BY AUTOMATED COUNT: 13.2 % (ref 12.3–15.4)
FACT VIII ACT/NOR PPP: 96 % (ref 56–140)
FERRITIN SERPL-MCNC: 219 NG/ML (ref 30–400)
FOLATE SERPL-MCNC: 11.6 NG/ML (ref 4.78–24.2)
GAMMA GLOB SERPL ELPH-MCNC: 0.6 G/DL (ref 0.4–1.8)
GLOBULIN SER-MCNC: 2.6 G/DL (ref 2.2–3.9)
HCT VFR BLD AUTO: 43.7 % (ref 37.5–51)
HGB BLD-MCNC: 15 G/DL (ref 13–17.7)
IGA SERPL-MCNC: 195 MG/DL (ref 90–386)
IGG SERPL-MCNC: 594 MG/DL (ref 603–1613)
IGM SERPL-MCNC: 31 MG/DL (ref 20–172)
IMM GRANULOCYTES # BLD AUTO: 0.05 10*3/MM3 (ref 0–0.05)
IMM GRANULOCYTES NFR BLD AUTO: 0.5 % (ref 0–0.5)
INR PPP: 0.93 (ref 0.9–1.1)
INTERPRETATION SERPL IEP-IMP: ABNORMAL
IRON SATN MFR SERPL: 36 % (ref 20–50)
IRON SERPL-MCNC: 127 MCG/DL (ref 59–158)
LABORATORY COMMENT REPORT: ABNORMAL
LDH SERPL L TO P-CCNC: 174 U/L (ref 135–225)
LYMPHOCYTES # BLD AUTO: 2.08 10*3/MM3 (ref 0.7–3.1)
LYMPHOCYTES NFR BLD AUTO: 19.1 % (ref 19.6–45.3)
M PROTEIN SERPL ELPH-MCNC: ABNORMAL G/DL
MCH RBC QN AUTO: 31.9 PG (ref 26.6–33)
MCHC RBC AUTO-ENTMCNC: 34.3 G/DL (ref 31.5–35.7)
MCV RBC AUTO: 93 FL (ref 79–97)
MONOCYTES # BLD AUTO: 0.89 10*3/MM3 (ref 0.1–0.9)
MONOCYTES NFR BLD AUTO: 8.2 % (ref 5–12)
NEUTROPHILS # BLD AUTO: 7.61 10*3/MM3 (ref 1.7–7)
NEUTROPHILS NFR BLD AUTO: 69.9 % (ref 42.7–76)
NRBC BLD AUTO-RTO: 0 /100 WBC (ref 0–0.2)
PATH INTERP BLD-IMP: NORMAL
PLATELET # BLD AUTO: 298 10*3/MM3 (ref 140–450)
PROT SERPL-MCNC: 6.4 G/DL (ref 6–8.5)
PROTHROMBIN TIME: 12.6 SECONDS (ref 11.7–14.2)
RBC # BLD AUTO: 4.7 10*6/MM3 (ref 4.14–5.8)
TIBC SERPL-MCNC: 350 MCG/DL
UIBC SERPL-MCNC: 223 MCG/DL (ref 112–346)
VIT B12 SERPL-MCNC: 645 PG/ML (ref 211–946)
VWF AG ACT/NOR PPP IA: 126 % (ref 50–200)
VWF:RCO ACT/NOR PPP PL AGG: 115 % (ref 50–200)
WBC # BLD AUTO: 10.88 10*3/MM3 (ref 3.4–10.8)

## 2023-06-04 ENCOUNTER — TELEPHONE (OUTPATIENT)
Dept: GASTROENTEROLOGY | Facility: CLINIC | Age: 63
End: 2023-06-04

## 2023-06-04 DIAGNOSIS — I10 HYPERTENSION, UNSPECIFIED TYPE: ICD-10-CM

## 2023-06-05 RX ORDER — LISINOPRIL 5 MG/1
TABLET ORAL
Qty: 90 TABLET | Refills: 1 | OUTPATIENT
Start: 2023-06-05

## 2023-06-06 NOTE — TELEPHONE ENCOUNTER
"Caller: Nguyễn Santana \"NEHA\"    Relationship: Self    Best call back number: [phone]    Requested Prescriptions:   Requested Prescriptions     Pending Prescriptions Disp Refills    lisinopril (PRINIVIL,ZESTRIL) 5 MG tablet 90 tablet 1     Sig: Take 1 tablet by mouth Daily. Patient needs a appointment before next refill.     Refused Prescriptions Disp Refills    lisinopril (PRINIVIL,ZESTRIL) 5 MG tablet [Pharmacy Med Name: LISINOPRIL 5MG TABLETS] 90 tablet 1     Sig: TAKE 1 TABLET BY MOUTH EVERY DAY     Refused By: WILL BURDICK     Reason for Refusal: Prescriber not at this practice        Pharmacy where request should be sent: [id number] [address] - [phone]  - [phone]     Last office visit with prescribing clinician: Visit date not found   Last telemedicine visit with prescribing clinician: Visit date not found   Next office visit with prescribing clinician: Visit date not found     Additional details provided by patient:PATIENT HAS APPROX A WEEK LEFT    Does the patient have less than a 3 day supply:  [] Yes  [x] No    Would you like a call back once the refill request has been completed: [] Yes [x] No    If the office needs to give you a call back, can they leave a voicemail: [] Yes [x] No    Samir Lu   06/06/23 13:10 EDT         "

## 2023-06-07 RX ORDER — LISINOPRIL 5 MG/1
5 TABLET ORAL DAILY
Qty: 90 TABLET | Refills: 0 | Status: SHIPPED | OUTPATIENT
Start: 2023-06-07 | End: 2023-09-07 | Stop reason: SDUPTHER

## 2023-06-07 NOTE — TELEPHONE ENCOUNTER
Patient was to follow-up with me in no more than 6 months from January 2023.  Please schedule follow-up with me in July 2023.

## 2023-06-12 ENCOUNTER — OFFICE VISIT (OUTPATIENT)
Dept: ENDOCRINOLOGY | Age: 63
End: 2023-06-12
Payer: MEDICARE

## 2023-06-12 VITALS
HEART RATE: 67 BPM | OXYGEN SATURATION: 96 % | TEMPERATURE: 96.9 F | BODY MASS INDEX: 26.07 KG/M2 | SYSTOLIC BLOOD PRESSURE: 150 MMHG | HEIGHT: 69 IN | DIASTOLIC BLOOD PRESSURE: 80 MMHG | WEIGHT: 176 LBS

## 2023-06-12 DIAGNOSIS — E03.9 ACQUIRED HYPOTHYROIDISM: Primary | ICD-10-CM

## 2023-06-12 PROCEDURE — 99213 OFFICE O/P EST LOW 20 MIN: CPT | Performed by: NURSE PRACTITIONER

## 2023-06-12 RX ORDER — LEVOTHYROXINE SODIUM 112 UG/1
112 TABLET ORAL DAILY
COMMUNITY

## 2023-06-12 RX ORDER — LOVASTATIN 10 MG/1
10 TABLET ORAL
COMMUNITY
Start: 2023-06-04

## 2023-06-12 RX ORDER — BUDESONIDE, GLYCOPYRROLATE, AND FORMOTEROL FUMARATE 160; 9; 4.8 UG/1; UG/1; UG/1
2 AEROSOL, METERED RESPIRATORY (INHALATION) EVERY 12 HOURS
COMMUNITY
Start: 2023-05-31

## 2023-06-12 NOTE — PROGRESS NOTES
"Chief Complaint  Hypothyroidism (Patient's weight is stable, no feeling of fatigue, family hx of thyroid issues, great great grandfather on mother's side.//Wife passed on easter.)    Subjective        Nguyễn Santana [facility] to [facility] ENDOCRINOLOGY  History of Present Illness    Hypothyroid   diagnosed in 2014  Currently on levothyroxine 100mcg 5 days a week and 112mcg  2 days a week   Overall health is stable  Does have COPD    Objective   Vital Signs:  /80   Pulse 67   Temp 96.9 °F (36.1 °C) (Temporal)   Ht 175.3 cm (69\")   Wt 79.8 kg (176 lb)   SpO2 96%   BMI 25.99 kg/m²   Estimated body mass index is 25.99 kg/m² as calculated from the following:    Height as of this encounter: 175.3 cm (69\").    Weight as of this encounter: 79.8 kg (176 lb).             Physical Exam  Vitals reviewed.   Constitutional:       General: He is not in acute distress.  HENT:      Head: Normocephalic and atraumatic.   Cardiovascular:      Rate and Rhythm: Normal rate.   Pulmonary:      Effort: Pulmonary effort is normal. No respiratory distress.   Musculoskeletal:         General: No signs of injury. Normal range of motion.      Cervical back: Normal range of motion and neck supple.   Skin:     General: Skin is warm and dry.   Neurological:      Mental Status: He is alert and oriented to person, place, and time. Mental status is at baseline.   Psychiatric:         Mood and Affect: Mood normal.         Behavior: Behavior normal.         Thought Content: Thought content normal.         Judgment: Judgment normal.      Result Review :  The following data was reviewed by: SHANT Watts on 06/12/2023:  Common labs          11/28/2022    12:24 1/23/2023    12:28 1/24/2023    10:00   Common Labs   Glucose 67  76     BUN 13  16     Creatinine 1.15  1.13     Sodium 140  139     Potassium 4.9  4.2     Chloride 102  101     Calcium 10.1  10.2     Total Protein   6.4    Albumin   3.8    WBC   10.88  "   Hemoglobin   15.0    Hematocrit   43.7    Platelets   298    Total Cholesterol 240  251     Triglycerides 136  202     HDL Cholesterol 69  71     LDL Cholesterol  147  144     Hemoglobin A1C 5.30                     Assessment and Plan   Diagnoses and all orders for this visit:    1. Acquired hypothyroidism (Primary)  -     TSH; Future  -     T4, Free; Future             Follow Up   Return in about 1 year (around 6/12/2024).    Thyroid labs   No additional changes made today    Patient was given instructions and counseling regarding his condition or for health maintenance advice. Please see specific information pulled into the AVS if appropriate.     Leonie Johansen APRN

## 2023-07-10 ENCOUNTER — TELEPHONE (OUTPATIENT)
Dept: FAMILY MEDICINE CLINIC | Facility: CLINIC | Age: 63
End: 2023-07-10

## 2023-07-10 NOTE — TELEPHONE ENCOUNTER
"  Caller: Nguyễn Santana"NEHA\"    Relationship to patient: Self    Best call back number: [phone] (Home)     New or established patient?  [] New  [x] Established    Date of discharge:  7-8-23    Facility discharged from: [facility] [place]     Diagnosis/Symptoms: CHEST PAIN      Length of stay (If applicable):     Specialty Only: Did you see a [facility] health provider?    [] Yes  [x] No  DATE: 7/8/2023 11:56     PROVIDED INDICATION: chest pain     COMPARISON: Chest radiograph 06/29/2021     TECHNIQUE: Portable AP radiograph of the chest.     FINDINGS: Cardiac, mediastinal, and hilar contours are within normal limits. The lungs and pleural spaces are clear. The bones are unremarkable.   "

## 2023-07-11 ENCOUNTER — OFFICE VISIT (OUTPATIENT)
Dept: FAMILY MEDICINE CLINIC | Facility: CLINIC | Age: 63
End: 2023-07-11
Payer: MEDICARE

## 2023-07-11 VITALS
HEIGHT: 69 IN | TEMPERATURE: 97.9 F | OXYGEN SATURATION: 93 % | RESPIRATION RATE: 16 BRPM | WEIGHT: 174.8 LBS | SYSTOLIC BLOOD PRESSURE: 118 MMHG | HEART RATE: 68 BPM | BODY MASS INDEX: 25.89 KG/M2 | DIASTOLIC BLOOD PRESSURE: 64 MMHG

## 2023-07-11 DIAGNOSIS — D75.89 MACROCYTOSIS: ICD-10-CM

## 2023-07-11 DIAGNOSIS — R07.9 CHEST PAIN, UNSPECIFIED TYPE: Primary | ICD-10-CM

## 2023-07-11 DIAGNOSIS — D72.829 LEUKOCYTOSIS, UNSPECIFIED TYPE: ICD-10-CM

## 2023-07-11 DIAGNOSIS — M79.602 LEFT ARM PAIN: ICD-10-CM

## 2023-07-11 DIAGNOSIS — E55.9 VITAMIN D DEFICIENCY: ICD-10-CM

## 2023-07-11 DIAGNOSIS — I10 HYPERTENSION, UNSPECIFIED TYPE: ICD-10-CM

## 2023-07-11 DIAGNOSIS — F41.9 ANXIETY: ICD-10-CM

## 2023-07-11 DIAGNOSIS — L98.9 SKIN LESIONS: ICD-10-CM

## 2023-07-11 DIAGNOSIS — R09.89 LABILE BLOOD PRESSURE: ICD-10-CM

## 2023-07-11 DIAGNOSIS — M79.601 RIGHT ARM PAIN: ICD-10-CM

## 2023-07-11 DIAGNOSIS — E78.49 OTHER HYPERLIPIDEMIA: ICD-10-CM

## 2023-07-11 PROCEDURE — 1160F RVW MEDS BY RX/DR IN RCRD: CPT | Performed by: PHYSICIAN ASSISTANT

## 2023-07-11 PROCEDURE — 99214 OFFICE O/P EST MOD 30 MIN: CPT | Performed by: PHYSICIAN ASSISTANT

## 2023-07-11 PROCEDURE — 1159F MED LIST DOCD IN RCRD: CPT | Performed by: PHYSICIAN ASSISTANT

## 2023-07-11 RX ORDER — BACLOFEN 10 MG/1
TABLET ORAL
COMMUNITY
Start: 2023-07-08

## 2023-07-11 RX ORDER — IBUPROFEN 800 MG/1
TABLET ORAL
COMMUNITY
Start: 2023-07-08

## 2023-07-11 NOTE — PROGRESS NOTES
Subjective   Nguyễn Santana is a 62 y.o. male who presents today in follow-up of ER for chest pressure with diaphoresis as well as follow-up of hypertension, leukocytosis, easy bruising, LUQ pain, GI symptoms, anxiety, and specialists for hyperlipidemia, hypothyroidism, vitamin D deficiency, COPD, pulmonary nodules.      Hypertension  Associated symptoms include chest pain and shortness of breath.   Abdominal Pain    Hypothyroidism  Associated symptoms include abdominal pain, chest pain and numbness.      Patient went to the emergency department 7/8/2023 for chest pressure with associated diaphoresis.  He had pain from his right shoulder that went across his chest into his left shoulder.  He denied shortness of breath, change in cough, fever, swelling or other pain.  Patient had elevated blood pressure.  He was given ibuprofen and baclofen.  Labs-WBC 13.3 with elevated neutrophils, negative high-sensitivity troponin, negative BNP.  Trace protein in urine.  Negative D-dimer.  Chest x-ray negative.    He reports his BP was up and down all day. He woke up with pain in right arm, across chest and all the way down left arm. He had headache. Went to sleep and woke up and felt poorly.   /89. He knew he needed to get checked out.   They gave 4 baby aspirin and felt better but not 100%. BP improved and they released to follow up here and cardiology.   No neck pain.     Since leaving, they put on ibuprofen and Baclofen. When stood up and laid down, pain resolved in right arm/ shoulder but sitting had pain in right shoulder and arm. He has had improvement. Feeling about 60% improvement.     Hypertension- taking Lisinopril 5 mg once daily. BP is still labile- changes with position changes.  He had not checked until last week when he felt bad. /89. He knew he needed to get checked out.   His BP goes up and down some. BP goes up when he has increased anxiety- pulmonologist advised he will have this with taking  albuterol so often.  Previously stopped Spiriva and Advair and started Trelegy. Helps but does not last as long.  He did not have to use emergency inhaler so often.   Leukocytosis- told that from daily prednisone.   Still bruising a lot- he is still on daily prednisone from pulmonology. He reports barely hitting himself results in increased bruising. No bruising on legs, trunk, back. He has only on bilateral hands and arms. No blood in urine or stool. No nosebleeds or other bleeding.    Anxiety, moods-increased stress- wife  in April. She was not feeling well- took her to hospital. Heart attack while in the hospital and tried CPR. Called easter morning and told him she passed away.   She was paralyzed from waist down from CVA 3 years ago. She was doing ok then did not want to eat any longer and would get weak because she would not eat. They put tube in but scraped her stomach and caused bleeding. Had to cauterize that. She then had a hard time breathing and could not put on ventilator. Put on BIPAP.   He has custody of 5 grandkids.   Sometimes less anxiety. A few years ago, Breanna put him on something for anxiety- he felt medicine head and did not like that. He was given Celexa previously. Also given Xanax very briefly. When something goes wrong, he gets upset and has to try to calm anxiety down.   Caring for 5 grandchildren and his wife had CVA with significant deficit.     Rash- He has spots on arms for a couple months. He has tried antibiotic ointment without relief. He started left arm then went to right arm. Brother said had similar with skin cancer but is extensor surface of the forearms. No other skin lesions. Only on forearms. Not on upper arm.     Left upper quadrant pain-He underwent EGD with gastritis-positive H. pylori and internal hemorrhoids and colonoscopy with 1 tubulovillous adenoma polyp.  Treated with Protonix, bismuth, tetracycline, and Flagyl.  Advised follow-up with APRN 4 weeks from  12/2022 and repeat H. pylori testing in 8 weeks.  Repeat colonoscopy in 3 years.  They ordered gastric emptying study-had to reschedule while his wife is on the hospital. Antibiotics helped but depends on what he eats- still has pain.   Patient reported pain in LUQ under his ribs since 2014 when Dx with COPD. Got up and was having no problems. He got up and started moving around and had tingling in both arms, both legs, pressure behind both eyes, and pain under ribs. /137. Went to ER.   Patient was seen in the ER 6/29/2021 for left chest pain.  Negative troponin.  Elevated WBC and macrocytosis- patient was told from chronic steroid.  Chest x-ray without acute changes.  Patient was started on lisinopril for elevated blood pressure. He reports he took medication and slept all day x 2 days. He then started 1/2 twice daily- tolerating better but feels medicine head and dizzy with standing up. They warned him about this but he does not tolerate a lot of medications.   When eating, getting bloated. Stomach hurts then improves. Does not matter what he eats, he has symptoms. GB remains.  Had EGD 2014 when he had symptoms in the past.   Negative gallbladder ultrasound and HIDA scan 9/2022.  CT abdomen pelvis 9/2022- 15 mm simple cyst left kidney.  Otherwise negative.  8/30/2022-patient developed left upper abdominal pain last week-reporting sharp pain.  He also had blood pressure elevated 170/90, tingling in bilateral legs and extreme fatigue.  He took his blood pressure medication, laid down, and woke up feeling normal.  He had this happen 5 years ago with negative cardiac work-up.  CT with fatty area on site where he felt stabbing pain.    Flexeril given for pain and only taken twice.  Pain in left tricep- depends on position. If he stands up, it resolves. Tylenol resolves as well. Does not feel he needs imaging or PT.   Numbness/tingling arms and legs- sometimes- still comes ago- still positional. Not worsening.    Every once in a while, numbness and tingling in arms and legs from knee down. Then moves and goes away.     He is following with endocrinology-SHANT Watts with last appointment 6/2023  Hypothyroidism-changed thyroid 100 mcg 5 days per week and 112 mcg 2 days per week.   Cholesterol- still on Lovastatin 10 mg. They have not mentioned cholesterol.   Vitamin D- taking vitamin D 50,000 IU once weekly. Prescribed by endocrinology.     He is following with pulmonology  COPD-pulmonology- wants to consider transplant. Patient does not want to take. Put on Spiriva. Still on Prednisone, albuterol, Mucinex intermittent, Beztri, Singulair 10 mg     Patient's Specialists:  Cardiology- Dr Ziegler, SHANT Pleitez- last seen 3/2015  Endocrinology- SHANT Watts-last appointment 6/2023 for hypothyroidism, vitamin D deficiency, dyslipidemia.  Change Synthroid to 100 mcg 5 days a week and 112 2 days a week.  Change Livalo to lovastatin since insurance is not covering Livalo. Continue vitamin D 50,000 IU once weekly. Gave glucometer to check glucose after meals if fatigued. Follow-up in 1 year.  Prior SHANT Hawthorne/ Dr West- last appt 12/2018- treating for hypothyroidism, lipid, and vitamin d. Last labs 3/2019- follow up labs 5/20/19 and 7/2019 and follow up with Dr West 7/2019  GI-Dr. Markos Jimenez-last appointment 10/2022 for unexplained epigastric 10 left upper quadrant pain and due for colon cancer screening.  Noted bloating, early satiety.  Advised pantoprazole 40 mg daily, gastric emptying study, colonoscopy and EGD.  After EGD- treated H. pylori with Protonix 40 mg twice daily for 14 days then once daily, Flagyl 250 mg, tetracycline, bismuth.  Follow-up with nurse practitioner in 4 weeks.  Colonoscopy and EGD performed 12/2022-biopsy positive for H. pylori and tubulovillous adenoma.  Treated and advised recheck urease breath test or stool antigen in 8 weeks, repeat colonoscopy 3 years.     Pulmonology- Dr Colon- last appt 12/2022 in follow-up of emphysema, pulmonary nodules, bronchiectasis, and periodic limb movement disorder.  CT 5/2021 was stable from 12/2019  They have discussed lung transplant consultation, however, patient does not want to have lung transplant and will not see the specialist at this time.changed Trelegy to Breztri. No other changes. Follow up 3 months.       The following portions of the patient's history were reviewed and updated as appropriate: allergies, current medications, past family history, past medical history, past social history, past surgical history and problem list.    Review of Systems   Respiratory:  Positive for shortness of breath.    Cardiovascular:  Positive for chest pain.   Gastrointestinal:  Positive for abdominal pain.   Neurological:  Positive for numbness.   Hematological:  Bruises/bleeds easily.   All other systems reviewed and are negative.    Objective    Vitals:    07/11/23 1344   BP: 118/64   Pulse: 68   Resp: 16   Temp: 97.9 °F (36.6 °C)   SpO2: 93%   Body mass index is 25.8 kg/m².     Physical Exam   Constitutional: He is oriented to person, place, and time. He appears well-developed. No distress.   HENT:   Head: Normocephalic and atraumatic.   Right Ear: External ear normal.   Left Ear: External ear normal.   Eyes: Conjunctivae are normal.   Neck: Carotid bruit is not present. No tracheal deviation present. No thyroid mass and no thyromegaly present.   Cardiovascular: Normal rate, regular rhythm, normal heart sounds and normal pulses.   Pulmonary/Chest: Effort normal and breath sounds normal.   Abdominal: Normal appearance.   Neurological: He is alert and oriented to person, place, and time. Gait normal.   Skin: Skin is warm and dry.   Psychiatric: His behavior is normal. Mood, judgment and thought content normal.   Nursing note and vitals reviewed.    Assessment & Plan   Diagnoses and all orders for this visit:    1. Chest pain,  unspecified type (Primary)  -     Ambulatory Referral to Cardiology    2. Right arm pain  -     Ambulatory Referral to Cardiology    3. Left arm pain  -     Ambulatory Referral to Cardiology    4. Labile blood pressure  -     Ambulatory Referral to Cardiology    5. Hypertension, unspecified type  -     Comprehensive Metabolic Panel  -     Urinalysis With Culture If Indicated -  -     Ambulatory Referral to Cardiology    6. Other hyperlipidemia  -     Comprehensive Metabolic Panel  -     CK  -     Lipid Panel With LDL / HDL Ratio  -     Ambulatory Referral to Cardiology    7. Vitamin D deficiency  -     Comprehensive Metabolic Panel  -     Vitamin D,25-Hydroxy    8. Leukocytosis, unspecified type  -     CBC & Differential    9. Macrocytosis  -     CBC & Differential    10. Anxiety    11. Skin lesions  -     Discontinue: betamethasone valerate (VALISONE) 0.1 % ointment; Apply 1 application  topically to the appropriate area as directed 2 (Two) Times a Day.  Dispense: 45 g; Refill: 0  -     Ambulatory Referral to Dermatology  -     betamethasone valerate (VALISONE) 0.1 % ointment; Apply 1 application  topically to the appropriate area as directed 2 (Two) Times a Day.  Dispense: 45 g; Refill: 0    Other orders  -     Microscopic Examination -      Assessment and Plan  Patient will have fasting labs. Call if no results in 1 week. Stability of conditions, plan, follow up, and further recommendations pending labs. Follow up in no more than 6 months if he is feeling well without concerns.     Chest pain, labile blood pressure-patient was seen in the emergency department for chest pain with radiation into his shoulders and elevated blood pressure pain was associated with diaphoresis.  I did asked that he see cardiology for evaluation and to consider further work-up.  I will refer today.  Left chest pain- He has history of left chest pain then had episode of pain with radiation to bilateral shoulders.  I will refer to  cardiology for evaluation and further work-up and treatment recommendations.   Hypertension- BP is stable today but was very elevated.  Blood pressure has been labile. Patient to continue Lisinopril 5 mg once daily.  I will refer to cardiology for further evaluation.   Leukocytosis- He was told this was from chronic prednisone use.  This is definitely a possibility.  I will continue to monitor and make recommendations.  Macrocytosis- Recheck and B12 and folate levels were ok 7/2021. I will continue to monitor.  Anxiety, adjustment reaction, grief reaction-Patient with episodes of anxiety. He has not necessarily wanted daily medication but something to take if he has an increased anxiety day. Patient was given Atarax as needed. He understands that he cannot drive, lift, work on medication. He should let me know if he is using frequently and we will need to consider daily medication. He was previously on Celexa daily- we could consider this.he has had increased stress with custody of his 5 grandchildren and caring for his wife who had previous CVA and was paralyzed.  However, she then passed away Easter 2023.  I discussed with patient consideration of therapy/counseling and medication.  He will let me know if he is willing to consider this.  Skin lesions-He has skin lesions on his arms for couple months.  He reports his brother had similar lesions and had skin cancer.  There are numerous lesions that are difficult to determine.  There may be some actinic keratosis, however, there is also some component of trauma/picking.  I will give betamethasone to try on some of these areas and will refer to dermatology for evaluation and further treatment.  LUQ abdominal pain- Patient had improvement in symptoms. To follow-up with GI if any recurrence.   COPD, pulmonary nodules, bronchiectasis, PLMD- Continue follow up with pulmonology and treatment as directed by them.    Paresthesias/ neuropathy- B12/ folate levels were ok.  Consideration of additional B vitamin testing and consideration of EMG/ NCS. To be seen if persistent symptoms, worsening, new, or changing symptoms.     I spent 35 minutes caring for Nguyễn Santana on this date of service. This time includes time spent by me in the following activities as necessary: preparing for the visit, reviewing tests, specialists records and previous visits, obtaining and/or reviewing a separately obtained history, performing a medically appropriate exam and/or evaluation, counseling and educating the patient, family, caregiver, referring and/or communicating with other healthcare professionals, documenting information in the medical record, independently interpreting results and communicating that information with the patient, family, caregiver, and developing a medically appropriate treatment plan with consideration of other conditions, medications, and treatments.

## 2023-07-13 LAB
25(OH)D3+25(OH)D2 SERPL-MCNC: 77.3 NG/ML (ref 30–100)
ALBUMIN SERPL-MCNC: 4.6 G/DL (ref 3.5–5.2)
ALBUMIN/GLOB SERPL: 2.7 G/DL
ALP SERPL-CCNC: 64 U/L (ref 39–117)
ALT SERPL-CCNC: 14 U/L (ref 1–41)
APPEARANCE UR: CLEAR
AST SERPL-CCNC: 12 U/L (ref 1–40)
BACTERIA #/AREA URNS HPF: NORMAL /HPF
BASOPHILS # BLD AUTO: 0.12 10*3/MM3 (ref 0–0.2)
BASOPHILS NFR BLD AUTO: 1 % (ref 0–1.5)
BILIRUB SERPL-MCNC: 0.7 MG/DL (ref 0–1.2)
BILIRUB UR QL STRIP: NEGATIVE
BUN SERPL-MCNC: 16 MG/DL (ref 8–23)
BUN/CREAT SERPL: 13.4 (ref 7–25)
CALCIUM SERPL-MCNC: 9.6 MG/DL (ref 8.6–10.5)
CASTS URNS QL MICRO: NORMAL /LPF
CHLORIDE SERPL-SCNC: 106 MMOL/L (ref 98–107)
CHOLEST SERPL-MCNC: 207 MG/DL (ref 0–200)
CK SERPL-CCNC: 112 U/L (ref 20–200)
CO2 SERPL-SCNC: 22.9 MMOL/L (ref 22–29)
COLOR UR: YELLOW
CREAT SERPL-MCNC: 1.19 MG/DL (ref 0.76–1.27)
EGFRCR SERPLBLD CKD-EPI 2021: 69.9 ML/MIN/1.73
EOSINOPHIL # BLD AUTO: 0.15 10*3/MM3 (ref 0–0.4)
EOSINOPHIL NFR BLD AUTO: 1.2 % (ref 0.3–6.2)
EPI CELLS #/AREA URNS HPF: NORMAL /HPF (ref 0–10)
ERYTHROCYTE [DISTWIDTH] IN BLOOD BY AUTOMATED COUNT: 13.2 % (ref 12.3–15.4)
GLOBULIN SER CALC-MCNC: 1.7 GM/DL
GLUCOSE SERPL-MCNC: 88 MG/DL (ref 65–99)
GLUCOSE UR QL STRIP: NEGATIVE
HCT VFR BLD AUTO: 47 % (ref 37.5–51)
HDLC SERPL-MCNC: 75 MG/DL (ref 40–60)
HGB BLD-MCNC: 16.1 G/DL (ref 13–17.7)
HGB UR QL STRIP: NEGATIVE
IMM GRANULOCYTES # BLD AUTO: 0.13 10*3/MM3 (ref 0–0.05)
IMM GRANULOCYTES NFR BLD AUTO: 1.1 % (ref 0–0.5)
KETONES UR QL STRIP: NEGATIVE
LDLC SERPL CALC-MCNC: 112 MG/DL (ref 0–100)
LDLC/HDLC SERPL: 1.45 {RATIO}
LEUKOCYTE ESTERASE UR QL STRIP: NEGATIVE
LYMPHOCYTES # BLD AUTO: 2.76 10*3/MM3 (ref 0.7–3.1)
LYMPHOCYTES NFR BLD AUTO: 22.8 % (ref 19.6–45.3)
MCH RBC QN AUTO: 31.9 PG (ref 26.6–33)
MCHC RBC AUTO-ENTMCNC: 34.3 G/DL (ref 31.5–35.7)
MCV RBC AUTO: 93.1 FL (ref 79–97)
MICRO URNS: NORMAL
MICRO URNS: NORMAL
MONOCYTES # BLD AUTO: 1.07 10*3/MM3 (ref 0.1–0.9)
MONOCYTES NFR BLD AUTO: 8.9 % (ref 5–12)
NEUTROPHILS # BLD AUTO: 7.86 10*3/MM3 (ref 1.7–7)
NEUTROPHILS NFR BLD AUTO: 65 % (ref 42.7–76)
NITRITE UR QL STRIP: NEGATIVE
NRBC BLD AUTO-RTO: 0 /100 WBC (ref 0–0.2)
PH UR STRIP: 6 [PH] (ref 5–7.5)
PLATELET # BLD AUTO: 287 10*3/MM3 (ref 140–450)
POTASSIUM SERPL-SCNC: 4.5 MMOL/L (ref 3.5–5.2)
PROT SERPL-MCNC: 6.3 G/DL (ref 6–8.5)
PROT UR QL STRIP: NORMAL
RBC # BLD AUTO: 5.05 10*6/MM3 (ref 4.14–5.8)
RBC #/AREA URNS HPF: NORMAL /HPF (ref 0–2)
SODIUM SERPL-SCNC: 139 MMOL/L (ref 136–145)
SP GR UR STRIP: 1.02 (ref 1–1.03)
TRIGL SERPL-MCNC: 117 MG/DL (ref 0–150)
URINALYSIS REFLEX: NORMAL
UROBILINOGEN UR STRIP-MCNC: 0.2 MG/DL (ref 0.2–1)
VLDLC SERPL CALC-MCNC: 20 MG/DL (ref 5–40)
WBC # BLD AUTO: 12.09 10*3/MM3 (ref 3.4–10.8)
WBC #/AREA URNS HPF: NORMAL /HPF (ref 0–5)

## 2023-09-07 ENCOUNTER — OFFICE VISIT (OUTPATIENT)
Dept: CARDIOLOGY | Facility: CLINIC | Age: 63
End: 2023-09-07
Payer: MEDICARE

## 2023-09-07 VITALS
WEIGHT: 167 LBS | BODY MASS INDEX: 24.73 KG/M2 | HEIGHT: 69 IN | OXYGEN SATURATION: 95 % | DIASTOLIC BLOOD PRESSURE: 84 MMHG | SYSTOLIC BLOOD PRESSURE: 140 MMHG | HEART RATE: 71 BPM

## 2023-09-07 DIAGNOSIS — I10 PRIMARY HYPERTENSION: ICD-10-CM

## 2023-09-07 DIAGNOSIS — R06.09 DOE (DYSPNEA ON EXERTION): ICD-10-CM

## 2023-09-07 DIAGNOSIS — R07.2 PRECORDIAL PAIN: Primary | ICD-10-CM

## 2023-09-07 DIAGNOSIS — I10 HYPERTENSION, UNSPECIFIED TYPE: ICD-10-CM

## 2023-09-07 PROCEDURE — 93000 ELECTROCARDIOGRAM COMPLETE: CPT | Performed by: INTERNAL MEDICINE

## 2023-09-07 PROCEDURE — 1160F RVW MEDS BY RX/DR IN RCRD: CPT | Performed by: INTERNAL MEDICINE

## 2023-09-07 PROCEDURE — 99204 OFFICE O/P NEW MOD 45 MIN: CPT | Performed by: INTERNAL MEDICINE

## 2023-09-07 PROCEDURE — 3079F DIAST BP 80-89 MM HG: CPT | Performed by: INTERNAL MEDICINE

## 2023-09-07 PROCEDURE — 3077F SYST BP >= 140 MM HG: CPT | Performed by: INTERNAL MEDICINE

## 2023-09-07 PROCEDURE — 1159F MED LIST DOCD IN RCRD: CPT | Performed by: INTERNAL MEDICINE

## 2023-09-07 RX ORDER — UMECLIDINIUM 62.5 UG/1
1 AEROSOL, POWDER ORAL DAILY
COMMUNITY
Start: 2023-07-26

## 2023-09-07 RX ORDER — LISINOPRIL 5 MG/1
5 TABLET ORAL 2 TIMES DAILY
Qty: 180 TABLET | Refills: 3 | Status: SHIPPED | OUTPATIENT
Start: 2023-09-07

## 2023-09-07 NOTE — PROGRESS NOTES
"      CARDIOLOGY    Luciana Ziegler MD    ENCOUNTER DATE:  09/07/2023    Nguyễn Santana / 62 y.o. / male        CHIEF COMPLAINT / REASON FOR OFFICE VISIT     Heart Problem (New patient c/o arm and chest pain )      HISTORY OF PRESENT ILLNESS       HPI    Nguyễn Santana is a 62 y.o. male     I saw this patient back in 2015.  At that time he had a stress echo which showed normal LV function, no significant valve disease and no evidence of ischemia.    He comes in today because he has been having some elevated blood pressure.  He was recently put on lisinopril.  He has some numbness down his right arm.  He has some discomfort in his chest.  He has a lot of shortness of breath due to COPD.  He has anxiety.    REVIEW OF SYSTEMS     Review of Systems   Constitutional: Negative for chills, fever, weight gain and weight loss.   Cardiovascular:  Negative for leg swelling.   Respiratory:  Positive for shortness of breath. Negative for cough, snoring and wheezing.    Hematologic/Lymphatic: Negative for bleeding problem. Does not bruise/bleed easily.   Skin:  Negative for color change.   Musculoskeletal:  Negative for falls, joint pain and myalgias.   Gastrointestinal:  Negative for melena.   Genitourinary:  Negative for hematuria.   Neurological:  Negative for excessive daytime sleepiness.   Psychiatric/Behavioral:  Negative for depression. The patient is not nervous/anxious.        VITAL SIGNS     Visit Vitals  /84 (BP Location: Right arm, Patient Position: Sitting, Cuff Size: Adult)   Pulse 71   Ht 175.3 cm (69\")   Wt 75.8 kg (167 lb)   SpO2 95%   BMI 24.66 kg/m²         Wt Readings from Last 3 Encounters:   09/07/23 75.8 kg (167 lb)   07/11/23 79.3 kg (174 lb 12.8 oz)   06/12/23 79.8 kg (176 lb)     Body mass index is 24.66 kg/m².      PHYSICAL EXAMINATION     Constitutional:       General: Not in acute distress.  Neck:      Vascular: No carotid bruit or JVD.   Pulmonary:      Effort: Pulmonary effort is " normal.      Breath sounds: Wheezing present.   Cardiovascular:      Normal rate. Regular rhythm.      Murmurs: There is no murmur.   Psychiatric:         Mood and Affect: Mood and affect normal.         REVIEWED DATA       ECG 12 Lead    Date/Time: 9/7/2023 11:56 AM  Performed by: Luciana Ziegler MD  Authorized by: Luciana Ziegler MD   Comparison: not compared with previous ECG   Rhythm: sinus rhythm  BPM: 71  Conduction: conduction normal  ST Segments: ST segments normal  T Waves: T waves normal    Clinical impression: normal ECG          Lipid Panel          11/28/2022    12:24 1/23/2023    12:28 7/12/2023    08:59   Lipid Panel   Total Cholesterol 240  251  207    Triglycerides 136  202  117    HDL Cholesterol 69  71  75    VLDL Cholesterol 24  36  20    LDL Cholesterol  147  144  112    LDL/HDL Ratio   1.45        Lab Results   Component Value Date    GLUCOSE 88 07/12/2023    BUN 16 07/12/2023    CREATININE 1.19 07/12/2023    EGFRRESULT 69.9 07/12/2023    BCR 13.4 07/12/2023    K 4.5 07/12/2023    CO2 22.9 07/12/2023    CALCIUM 9.6 07/12/2023    PROTENTOTREF 6.3 07/12/2023    ALBUMIN 4.6 07/12/2023    BILITOT 0.7 07/12/2023    AST 12 07/12/2023    ALT 14 07/12/2023       ASSESSMENT & PLAN      Diagnosis Plan   1. Precordial pain  Stress Test With Myocardial Perfusion One Day    Adult Transthoracic Echo Complete W/ Cont if Necessary Per Protocol      2. Primary hypertension  Stress Test With Myocardial Perfusion One Day    Adult Transthoracic Echo Complete W/ Cont if Necessary Per Protocol      3. CARDENAS (dyspnea on exertion)  Stress Test With Myocardial Perfusion One Day    Adult Transthoracic Echo Complete W/ Cont if Necessary Per Protocol      4. Hypertension, unspecified type  lisinopril (PRINIVIL,ZESTRIL) 5 MG tablet          1.  Chest pain.  He had a dobutamine echo in 2015.  He says there is no way he can do the treadmill because of his lung disease.  I am going to set him up for a [employer]  stress test.  2.  Hypertension.  I told him he can take the lisinopril 5 mg twice a day.  Continue to monitor blood pressure.  3.  COPD.  He is wheezing today.  4.  Tobacco use.    One of my nurses or I will go over the results of the nuclear stress test and echocardiogram when they become available.      Orders Placed This Encounter   Procedures    Stress Test With Myocardial Perfusion One Day     Standing Status:   Future     Standing Expiration Date:   9/7/2024     Order Specific Question:   What stress agent will be used?     Answer:   Exercise with possible pharmacologic     Order Specific Question:   Reason for exam?     Answer:   Chest Pain     Order Specific Question:   Release to patient     Answer:   Routine Release [[id number]]    ECG 12 Lead     This order was created via procedure documentation     Order Specific Question:   Release to patient     Answer:   Routine Release [[id number]]    Adult Transthoracic Echo Complete W/ Cont if Necessary Per Protocol     Standing Status:   Future     Standing Expiration Date:   9/6/2024     Order Specific Question:   Reason for exam?     Answer:   Chest Pain     Order Specific Question:   Reason for exam?     Answer:   Dyspnea     Order Specific Question:   Release to patient     Answer:   Routine Release [[id number]]           MEDICATIONS         Discharge Medications            Accurate as of September 7, 2023 11:57 AM. If you have any questions, ask your nurse or doctor.                Changes to Medications        Instructions Start Date   albuterol (2.5 MG/3ML) 0.083% nebulizer solution  Commonly known as: PROVENTIL  What changed: additional instructions   2.5 mg, Nebulization, 4 Times Daily - RT, Patient will call when needed      albuterol 2 MG tablet  Commonly known as: PROVENTIL  What changed: Another medication with the same name was changed. Make sure you understand how and when to take each.   2 mg, Oral, 4 Times Daily      albuterol sulfate  (90  Base) MCG/ACT inhaler  Commonly known as: PROVENTIL HFA;VENTOLIN HFA;PROAIR HFA  What changed: Another medication with the same name was changed. Make sure you understand how and when to take each.   2 puffs, Inhalation, Every 4 Hours PRN      lisinopril 5 MG tablet  Commonly known as: PRINIVIL,ZESTRIL  What changed: when to take this  Changed by: Luciana Ziegler MD   5 mg, Oral, 2 Times Daily, Contact the office for appointment      levothyroxine 112 MCG tablet  Commonly known as: SYNTHROID, LEVOTHROID  What changed: Another medication with the same name was changed. Make sure you understand how and when to take each.   112 mcg, Oral, Daily, Taking 1 tablet 2 times a week.      Synthroid 100 MCG tablet  Generic drug: levothyroxine  What changed: additional instructions   Take 1 tab for 7 days a week             Continue These Medications        Instructions Start Date   aspirin 81 MG chewable tablet   81 mg, Oral, Daily      betamethasone valerate 0.1 % ointment  Commonly known as: VALISONE   1 application , Topical, 2 Times Daily      Breztri Aerosphere 160-9-4.8 MCG/ACT aerosol inhaler  Generic drug: Budeson-Glycopyrrol-Formoterol   2 puffs, Inhalation, Every 12 Hours      ibuprofen 800 MG tablet  Commonly known as: ADVIL,MOTRIN   TAKE 1 TABLET BY MOUTH THREE TIMES DAILY FOR 10 DAYS AS NEEDED FOR PAIN      Incruse Ellipta 62.5 MCG/ACT aerosol powder   Generic drug: Umeclidinium Bromide   1 puff, Inhalation, Daily      lovastatin 10 MG tablet  Commonly known as: MEVACOR   10 mg, Oral, Every Night at Bedtime      montelukast 10 MG tablet  Commonly known as: SINGULAIR   10 mg, Oral, Daily, Patient will call when needed      Nebulizer device   Does not apply      O2  Commonly known as: OXYGEN   4 L/min, Inhalation, Once, 4 LPM HS only      predniSONE 10 MG tablet  Commonly known as: DELTASONE   10 mg, Oral, Daily      VITAMIN D PO   Oral, Weekly             Stop These Medications      baclofen 10 MG  tablet  Commonly known as: LIORESAL  Stopped by: MD Luciana Chan MD  09/07/23  11:57 EDT    Part of this note may be an electronic transcription/translation of spoken language to printed text using the Dragon dictation system.

## 2023-09-20 ENCOUNTER — HOSPITAL ENCOUNTER (OUTPATIENT)
Dept: CARDIOLOGY | Facility: HOSPITAL | Age: 63
Discharge: HOME | End: 2023-09-20
Payer: MEDICARE

## 2023-09-20 ENCOUNTER — TELEPHONE (OUTPATIENT)
Dept: CARDIOLOGY | Facility: CLINIC | Age: 63
End: 2023-09-20
Payer: MEDICARE

## 2023-09-20 VITALS — BODY MASS INDEX: 24.82 KG/M2 | HEIGHT: 69 IN | WEIGHT: 167.55 LBS

## 2023-09-20 VITALS
HEIGHT: 69 IN | DIASTOLIC BLOOD PRESSURE: 86 MMHG | OXYGEN SATURATION: 93 % | WEIGHT: 167 LBS | HEART RATE: 60 BPM | SYSTOLIC BLOOD PRESSURE: 135 MMHG | BODY MASS INDEX: 24.73 KG/M2

## 2023-09-20 DIAGNOSIS — I10 PRIMARY HYPERTENSION: ICD-10-CM

## 2023-09-20 DIAGNOSIS — R07.2 PRECORDIAL PAIN: ICD-10-CM

## 2023-09-20 DIAGNOSIS — R06.09 DOE (DYSPNEA ON EXERTION): ICD-10-CM

## 2023-09-20 LAB
AORTIC DIMENSIONLESS INDEX: 0.8 (DI)
ASCENDING AORTA: 3 CM
BH CV ECHO MEAS - ACS: 1.04 CM
BH CV ECHO MEAS - AO MAX PG: 4.2 MMHG
BH CV ECHO MEAS - AO MEAN PG: 2 MMHG
BH CV ECHO MEAS - AO ROOT DIAM: 2.7 CM
BH CV ECHO MEAS - AO V2 MAX: 102 CM/SEC
BH CV ECHO MEAS - AO V2 VTI: 21.2 CM
BH CV ECHO MEAS - AVA(I,D): 4.1 CM2
BH CV ECHO MEAS - EDV(CUBED): 117.6 ML
BH CV ECHO MEAS - EDV(MOD-SP2): 117 ML
BH CV ECHO MEAS - EDV(MOD-SP4): 101 ML
BH CV ECHO MEAS - EF(MOD-BP): 61.4 %
BH CV ECHO MEAS - EF(MOD-SP2): 62.4 %
BH CV ECHO MEAS - EF(MOD-SP4): 59.4 %
BH CV ECHO MEAS - ESV(CUBED): 29 ML
BH CV ECHO MEAS - ESV(MOD-SP2): 44 ML
BH CV ECHO MEAS - ESV(MOD-SP4): 41 ML
BH CV ECHO MEAS - FS: 37.3 %
BH CV ECHO MEAS - IVS/LVPW: 1 CM
BH CV ECHO MEAS - IVSD: 0.8 CM
BH CV ECHO MEAS - LAT PEAK E' VEL: 12 CM/SEC
BH CV ECHO MEAS - LV DIASTOLIC VOL/BSA (35-75): 52.8 CM2
BH CV ECHO MEAS - LV MASS(C)D: 131.2 GRAMS
BH CV ECHO MEAS - LV MAX PG: 2.15 MMHG
BH CV ECHO MEAS - LV MEAN PG: 1 MMHG
BH CV ECHO MEAS - LV SYSTOLIC VOL/BSA (12-30): 21.4 CM2
BH CV ECHO MEAS - LV V1 MAX: 73.3 CM/SEC
BH CV ECHO MEAS - LV V1 VTI: 16.4 CM
BH CV ECHO MEAS - LVIDD: 4.9 CM
BH CV ECHO MEAS - LVIDS: 3.1 CM
BH CV ECHO MEAS - LVOT AREA: 5.3 CM2
BH CV ECHO MEAS - LVOT DIAM: 2.6 CM
BH CV ECHO MEAS - LVPWD: 0.8 CM
BH CV ECHO MEAS - MED PEAK E' VEL: 7.8 CM/SEC
BH CV ECHO MEAS - MV A DUR: 0.14 SEC
BH CV ECHO MEAS - MV A MAX VEL: 54.4 CM/SEC
BH CV ECHO MEAS - MV DEC SLOPE: 164.6 CM/SEC2
BH CV ECHO MEAS - MV DEC TIME: 364 SEC
BH CV ECHO MEAS - MV E MAX VEL: 57 CM/SEC
BH CV ECHO MEAS - MV E/A: 1.05
BH CV ECHO MEAS - MV MAX PG: 1.7 MMHG
BH CV ECHO MEAS - MV MEAN PG: 0.77 MMHG
BH CV ECHO MEAS - MV P1/2T: 106.2 MSEC
BH CV ECHO MEAS - MV V2 VTI: 22.9 CM
BH CV ECHO MEAS - MVA(P1/2T): 2.07 CM2
BH CV ECHO MEAS - MVA(VTI): 3.8 CM2
BH CV ECHO MEAS - PA ACC TIME: 0.13 SEC
BH CV ECHO MEAS - PA V2 MAX: 81.4 CM/SEC
BH CV ECHO MEAS - PULM A REVS DUR: 0.15 SEC
BH CV ECHO MEAS - PULM A REVS VEL: 33.4 CM/SEC
BH CV ECHO MEAS - PULM DIAS VEL: 51.8 CM/SEC
BH CV ECHO MEAS - PULM S/D: 0.88
BH CV ECHO MEAS - PULM SYS VEL: 45.4 CM/SEC
BH CV ECHO MEAS - QP/QS: 0.37
BH CV ECHO MEAS - RAP SYSTOLE: 3 MMHG
BH CV ECHO MEAS - RV MAX PG: 1.13 MMHG
BH CV ECHO MEAS - RV V1 MAX: 53.1 CM/SEC
BH CV ECHO MEAS - RV V1 VTI: 10.5 CM
BH CV ECHO MEAS - RVOT DIAM: 1.97 CM
BH CV ECHO MEAS - RVSP: 18 MMHG
BH CV ECHO MEAS - SI(MOD-SP2): 38.2 ML/M2
BH CV ECHO MEAS - SI(MOD-SP4): 31.4 ML/M2
BH CV ECHO MEAS - SUP REN AO DIAM: 1.9 CM
BH CV ECHO MEAS - SV(LVOT): 86.5 ML
BH CV ECHO MEAS - SV(MOD-SP2): 73 ML
BH CV ECHO MEAS - SV(MOD-SP4): 60 ML
BH CV ECHO MEAS - SV(RVOT): 32 ML
BH CV ECHO MEAS - TAPSE (>1.6): 2.7 CM
BH CV ECHO MEAS - TR MAX PG: 15.3 MMHG
BH CV ECHO MEAS - TR MAX VEL: 195.5 CM/SEC
BH CV ECHO MEASUREMENTS AVERAGE E/E' RATIO: 5.76
BH CV NUCLEAR PRIOR STUDY: 2
BH CV REST NUCLEAR ISOTOPE DOSE: 10.7 MCI
BH CV STRESS BP STAGE 1: NORMAL
BH CV STRESS COMMENTS STAGE 1: NORMAL
BH CV STRESS DOSE REGADENOSON STAGE 1: 0.4
BH CV STRESS DURATION MIN STAGE 1: 0
BH CV STRESS DURATION SEC STAGE 1: 10
BH CV STRESS HR STAGE 1: 87
BH CV STRESS NUCLEAR ISOTOPE DOSE: 34.4 MCI
BH CV STRESS PROTOCOL 1: NORMAL
BH CV STRESS RECOVERY BP: NORMAL MMHG
BH CV STRESS RECOVERY HR: 79 BPM
BH CV STRESS STAGE 1: 1
BH CV XLRA - RV BASE: 3.5 CM
BH CV XLRA - RV LENGTH: 8.4 CM
BH CV XLRA - RV MID: 2.49 CM
BH CV XLRA - TDI S': 11.6 CM/SEC
LEFT ATRIUM VOLUME INDEX: 20.9 ML/M2
LV EF NUC BP: 61 %
MAXIMAL PREDICTED HEART RATE: 160 BPM
PERCENT MAX PREDICTED HR: 54.38 %
SINUS: 3.1 CM
STJ: 2.49 CM
STRESS BASELINE BP: NORMAL MMHG
STRESS BASELINE HR: 60 BPM
STRESS PERCENT HR: 64 %
STRESS POST EXERCISE DUR SEC: 10 SEC
STRESS POST PEAK BP: NORMAL MMHG
STRESS POST PEAK HR: 87 BPM
STRESS TARGET HR: 136 BPM

## 2023-09-20 PROCEDURE — 0 TECHNETIUM TETROFOSMIN KIT: Performed by: INTERNAL MEDICINE

## 2023-09-20 PROCEDURE — 93017 CV STRESS TEST TRACING ONLY: CPT

## 2023-09-20 PROCEDURE — 78452 HT MUSCLE IMAGE SPECT MULT: CPT

## 2023-09-20 PROCEDURE — 93306 TTE W/DOPPLER COMPLETE: CPT

## 2023-09-20 PROCEDURE — A9502 TC99M TETROFOSMIN: HCPCS | Performed by: INTERNAL MEDICINE

## 2023-09-20 PROCEDURE — 25010000002 REGADENOSON 0.4 MG/5ML SOLUTION: Performed by: INTERNAL MEDICINE

## 2023-09-20 RX ORDER — REGADENOSON 0.08 MG/ML
0.4 INJECTION, SOLUTION INTRAVENOUS
Status: COMPLETED | OUTPATIENT
Start: 2023-09-20 | End: 2023-09-20

## 2023-09-20 RX ADMIN — TETROFOSMIN 1 DOSE: 1.38 INJECTION, POWDER, LYOPHILIZED, FOR SOLUTION INTRAVENOUS at 12:30

## 2023-09-20 RX ADMIN — REGADENOSON 0.4 MG: 0.08 INJECTION, SOLUTION INTRAVENOUS at 12:31

## 2023-09-20 RX ADMIN — TETROFOSMIN 1 DOSE: 1.38 INJECTION, POWDER, LYOPHILIZED, FOR SOLUTION INTRAVENOUS at 11:35

## 2023-09-20 NOTE — TELEPHONE ENCOUNTER
Please let him know that his nuclear stress test appeared to be normal indicating no evidence of any tightly blocked arteries at this time.  Echo showed normal LV function.  There were some age-related changes to his aortic valve but everything seems to be opening and closing well.  No further testing is needed at this time.  He should treat the lung disease.  Let me know if his symptoms worsen.

## 2023-09-20 NOTE — TELEPHONE ENCOUNTER
Results and recommendations called to pt.  Instructed to call with any further questions or concerns.  Verbalized understanding.    Bonnie Escalante RN  Triage Nurse, [facility]  09/20/23 15:29 EDT

## 2023-09-27 ENCOUNTER — TELEPHONE (OUTPATIENT)
Dept: ENDOCRINOLOGY | Age: 63
End: 2023-09-27
Payer: MEDICARE

## 2023-09-27 RX ORDER — LEVOTHYROXINE SODIUM 100 MCG
TABLET ORAL
Qty: 30 TABLET | Refills: 9 | Status: SHIPPED | OUTPATIENT
Start: 2023-09-27

## 2023-09-27 RX ORDER — LEVOTHYROXINE SODIUM 112 UG/1
TABLET ORAL
Qty: 15 TABLET | Refills: 9 | Status: SHIPPED | OUTPATIENT
Start: 2023-09-27

## 2023-11-29 RX ORDER — LOVASTATIN 10 MG/1
10 TABLET ORAL
Qty: 90 TABLET | OUTPATIENT
Start: 2023-11-29

## 2024-03-11 ENCOUNTER — OFFICE VISIT (OUTPATIENT)
Dept: FAMILY MEDICINE CLINIC | Facility: CLINIC | Age: 64
End: 2024-03-11
Payer: MEDICARE

## 2024-03-11 VITALS
DIASTOLIC BLOOD PRESSURE: 90 MMHG | OXYGEN SATURATION: 83 % | SYSTOLIC BLOOD PRESSURE: 178 MMHG | HEIGHT: 69 IN | BODY MASS INDEX: 24.44 KG/M2 | TEMPERATURE: 97.8 F | RESPIRATION RATE: 22 BRPM | HEART RATE: 81 BPM | WEIGHT: 165 LBS

## 2024-03-11 DIAGNOSIS — R06.02 SHORTNESS OF BREATH: ICD-10-CM

## 2024-03-11 DIAGNOSIS — D72.829 LEUKOCYTOSIS, UNSPECIFIED TYPE: ICD-10-CM

## 2024-03-11 DIAGNOSIS — K90.49 GASTROINTESTINAL INTOLERANCE TO FOODS: ICD-10-CM

## 2024-03-11 DIAGNOSIS — J44.9 CHRONIC OBSTRUCTIVE PULMONARY DISEASE, UNSPECIFIED COPD TYPE: ICD-10-CM

## 2024-03-11 DIAGNOSIS — J47.0 BRONCHIECTASIS WITH ACUTE LOWER RESPIRATORY INFECTION: ICD-10-CM

## 2024-03-11 DIAGNOSIS — E87.1 HYPONATREMIA: ICD-10-CM

## 2024-03-11 DIAGNOSIS — R10.13 EPIGASTRIC PAIN: ICD-10-CM

## 2024-03-11 DIAGNOSIS — E55.9 VITAMIN D DEFICIENCY: ICD-10-CM

## 2024-03-11 DIAGNOSIS — Z99.81 OXYGEN DEPENDENT: ICD-10-CM

## 2024-03-11 DIAGNOSIS — J10.1 INFLUENZA A: ICD-10-CM

## 2024-03-11 DIAGNOSIS — R91.8 PULMONARY NODULES: ICD-10-CM

## 2024-03-11 DIAGNOSIS — R09.02 HYPOXIA: ICD-10-CM

## 2024-03-11 DIAGNOSIS — D75.89 MACROCYTOSIS: ICD-10-CM

## 2024-03-11 DIAGNOSIS — J96.21 ACUTE ON CHRONIC RESPIRATORY FAILURE WITH HYPOXIA: Primary | ICD-10-CM

## 2024-03-11 DIAGNOSIS — K21.9 GASTROESOPHAGEAL REFLUX DISEASE, UNSPECIFIED WHETHER ESOPHAGITIS PRESENT: ICD-10-CM

## 2024-03-11 DIAGNOSIS — G47.33 OSA (OBSTRUCTIVE SLEEP APNEA): ICD-10-CM

## 2024-03-11 PROBLEM — J47.9 BRONCHIECTASIS: Status: ACTIVE | Noted: 2024-03-11

## 2024-03-11 PROBLEM — E78.5 HYPERLIPIDEMIA: Status: ACTIVE | Noted: 2024-03-11

## 2024-03-11 RX ORDER — PANTOPRAZOLE SODIUM 40 MG/1
40 TABLET, DELAYED RELEASE ORAL DAILY
Qty: 30 TABLET | Refills: 0 | Status: SHIPPED | OUTPATIENT
Start: 2024-03-11

## 2024-03-11 RX ORDER — TEZEPELUMAB-EKKO 210 MG/1.9ML
INJECTION, SOLUTION SUBCUTANEOUS
COMMUNITY

## 2024-03-11 NOTE — PROGRESS NOTES
Subjective   Nguyễn Santana is a 63 y.o. male who presents today in follow-up of hospitalization for acute on chronic respiratory failure due to influenza A with pneumonia as well as follow-up of hypertension, leukocytosis, easy bruising, LUQ pain, GI symptoms, anxiety, and specialists for hyperlipidemia, hypothyroidism, vitamin D deficiency, COPD, pulmonary nodules.      Hypertension  Associated symptoms include chest pain and shortness of breath.   Abdominal Pain    Hypothyroidism  Associated symptoms include abdominal pain, chest pain and numbness.      Patient was hospitalized 3/1/2024 through 3/4/2024 for acute on chronic hypoxic respiratory failure with influenza A, community-acquired pneumonia, hyponatremia, elevated troponin, hypothyroidism, hyperlipidemia, hypertension, GERD.    I have reviewed and discussed with patient hospital notes, including H&P, labs, imaging, consult notes, and discharge summary. Per discharge summary, he presented to the emergency department with 2 to 3 days of progressive dyspnea and had to wear his oxygen consistently throughout the day rather than just at night.  He also had intermittent low-grade fevers and a dry nonproductive cough.  He required 4 L oxygen nasal cannula with positive respiratory panel for influenza A.  Chest x-ray with possible pneumonia and procalcitonin was mildly elevated at 0.28, so he was started on CAP treatment with azithromycin and ceftriaxone.  He was also started on steroids for possible COPD exacerbation.  He was given DuoNebs and oseltamivir.  He had reaction to azithromycin after his second dose Tory felt hot and flushed, so medication was discontinued.  He was also found to be hyponatremic at 132 resolved by the time of discharge.  He was weaned to room air and passed a walk test without significant desaturation was stable for discharge.  Cardiology assessed during the ER and thought elevation of troponin was due to demand ischemia and  unrelated to CAD.  Labs-respiratory culture-positive for influenza A.  BNP normal, troponin I elevated at 27 and repeat elevated at 88, procalcitonin 0.28, lactic acid normal.  CMP with sodium 132, chloride 96, calcium 8.8, BUN 25, AST 41.  Urine with positive blood, ketones, protein  Chest x-ray with 31 2204-calcifications thoracic aorta, hazy airspace opacity left lateral midlung field.  Otherwise negative.  CT abdomen pelvis 3/1/2024-few small partially exophytic cysts left kidney, small umbilical hernia, small right femoral hernia, small left elbow hernia containing fat.  Otherwise negative.    Within 48 business hours after discharge, Nguyễn NIKOLAY Santana was contacted via telephone to coordinate care and needs.   Current outpatient and discharge medications have been reconciled for the patient.    Pulmonology-Dr. Colon- follow up 2 weeks. Was not seen by him in hospital.   Drinking 4-5 bottles of water daily.     GERD is bothering really bad. He reports when he tries to eat, he has significant pain. They gave him medication in the hospital and it worked really.     Patient went to the emergency department 7/8/2023 for chest pressure with associated diaphoresis.  He had pain from his right shoulder that went across his chest into his left shoulder.  He denied shortness of breath, change in cough, fever, swelling or other pain.  Patient had elevated blood pressure.  He was given ibuprofen and baclofen.  Labs-WBC 13.3 with elevated neutrophils, negative high-sensitivity troponin, negative BNP.  Trace protein in urine.  Negative D-dimer.  Chest x-ray negative.    He reports his BP was up and down all day. He woke up with pain in right arm, across chest and all the way down left arm. He had headache. Went to sleep and woke up and felt poorly.   /89. He knew he needed to get checked out.   They gave 4 baby aspirin and felt better but not 100%. BP improved and they released to follow up here and cardiology.    No neck pain.     Since leaving, they put on ibuprofen and Baclofen. When stood up and laid down, pain resolved in right arm/ shoulder but sitting had pain in right shoulder and arm. He has had improvement. Feeling about 60% improvement.     Hypertension- taking Lisinopril 5 mg once daily. BP is still labile- changes with position changes.  He had not checked until last week when he felt bad. /89. He knew he needed to get checked out.   His BP goes up and down some. BP goes up when he has increased anxiety- pulmonologist advised he will have this with taking albuterol so often.  Previously stopped Spiriva and Advair and started Trelegy. Helps but does not last as long.  He did not have to use emergency inhaler so often.   Leukocytosis- told that from daily prednisone.   Still bruising a lot- he is still on daily prednisone from pulmonology. He reports barely hitting himself results in increased bruising. No bruising on legs, trunk, back. He has only on bilateral hands and arms. No blood in urine or stool. No nosebleeds or other bleeding.    Anxiety, moods-increased stress- wife  in April. She was not feeling well- took her to hospital. Heart attack while in the hospital and tried CPR. Called easter morning and told him she passed away.   She was paralyzed from waist down from CVA 3 years ago. She was doing ok then did not want to eat any longer and would get weak because she would not eat. They put tube in but scraped her stomach and caused bleeding. Had to cauterize that. She then had a hard time breathing and could not put on ventilator. Put on BIPAP.   He has custody of 5 grandkids.   Sometimes less anxiety. A few years ago, Breanna put him on something for anxiety- he felt medicine head and did not like that. He was given Celexa previously. Also given Xanax very briefly. When something goes wrong, he gets upset and has to try to calm anxiety down.   Caring for 5 grandchildren and his wife had CVA  with significant deficit.     Rash- He has spots on arms for a couple months. He has tried antibiotic ointment without relief. He started left arm then went to right arm. Brother said had similar with skin cancer but is extensor surface of the forearms. No other skin lesions. Only on forearms. Not on upper arm.     Left upper quadrant pain-He underwent EGD with gastritis-positive H. pylori and internal hemorrhoids and colonoscopy with 1 tubulovillous adenoma polyp.  Treated with Protonix, bismuth, tetracycline, and Flagyl.  Advised follow-up with APRN 4 weeks from 12/2022 and repeat H. pylori testing in 8 weeks.  Repeat colonoscopy in 3 years.  They ordered gastric emptying study-had to reschedule while his wife is on the hospital. Antibiotics helped but depends on what he eats- still has pain.   Patient reported pain in LUQ under his ribs since 2014 when Dx with COPD. Got up and was having no problems. He got up and started moving around and had tingling in both arms, both legs, pressure behind both eyes, and pain under ribs. /137. Went to ER.   Patient was seen in the ER 6/29/2021 for left chest pain.  Negative troponin.  Elevated WBC and macrocytosis- patient was told from chronic steroid.  Chest x-ray without acute changes.  Patient was started on lisinopril for elevated blood pressure. He reports he took medication and slept all day x 2 days. He then started 1/2 twice daily- tolerating better but feels medicine head and dizzy with standing up. They warned him about this but he does not tolerate a lot of medications.   When eating, getting bloated. Stomach hurts then improves. Does not matter what he eats, he has symptoms. GB remains.  Had EGD 2014 when he had symptoms in the past.   Negative gallbladder ultrasound and HIDA scan 9/2022.  CT abdomen pelvis 9/2022- 15 mm simple cyst left kidney.  Otherwise negative.  8/30/2022-patient developed left upper abdominal pain last week-reporting sharp pain.  He  also had blood pressure elevated 170/90, tingling in bilateral legs and extreme fatigue.  He took his blood pressure medication, laid down, and woke up feeling normal.  He had this happen 5 years ago with negative cardiac work-up.  CT with fatty area on site where he felt stabbing pain.    Flexeril given for pain and only taken twice.  Pain in left tricep- depends on position. If he stands up, it resolves. Tylenol resolves as well. Does not feel he needs imaging or PT.   Numbness/tingling arms and legs- sometimes- still comes ago- still positional. Not worsening.   Every once in a while, numbness and tingling in arms and legs from knee down. Then moves and goes away.     He is following with endocrinology-SHANT Watts with last appointment 6/2023  Hypothyroidism-changed thyroid 100 mcg 5 days per week and 112 mcg 2 days per week.   Cholesterol- still on Lovastatin 10 mg. They have not mentioned cholesterol.   Vitamin D- taking vitamin D 50,000 IU once weekly. Prescribed by endocrinology.     He is following with pulmonology  COPD-pulmonology- wants to consider transplant. Patient does not want to take. Put on Spiriva. Still on Prednisone, albuterol, Mucinex intermittent, Beztri, Singulair 10 mg     Patient's Specialists:  Cardiology- Dr Ziegler, SHANT Pleitez-last appointment 9/2023 for precordial pain, hypertension, dyspnea on exertion.  Ordered Lexiscan nuclear stress test.  Advised he could take the lisinopril 5 mg twice daily.  Echo with normal LV function and some age-related changes to aortic valve but everything else seem to be okay.  Negative stress test.  Advised to let her know if any worsening symptoms.  Endocrinology- SHANT Watts-last appointment 6/2023 for hypothyroidism, vitamin D deficiency, dyslipidemia.  Change Synthroid to 100 mcg 5 days a week and 112 2 days a week.  Change Livalo to lovastatin since insurance is not covering Livalo. Continue vitamin D 50,000 IU once weekly.  Gave glucometer to check glucose after meals if fatigued. Follow-up in 1 year.  Prior SHANT Hawthorne/ Dr West- last appt 12/2018- treating for hypothyroidism, lipid, and vitamin d. Last labs 3/2019- follow up labs 5/20/19 and 7/2019 and follow up with Dr West 7/2019  GI-Dr. Markos Jimenez-last appointment 10/2022 for unexplained epigastric 10 left upper quadrant pain and due for colon cancer screening.  Noted bloating, early satiety.  Advised pantoprazole 40 mg daily, gastric emptying study, colonoscopy and EGD.  After EGD- treated H. pylori with Protonix 40 mg twice daily for 14 days then once daily, Flagyl 250 mg, tetracycline, bismuth.  Follow-up with nurse practitioner in 4 weeks.  Colonoscopy and EGD performed 12/2022-biopsy positive for H. pylori and tubulovillous adenoma.  Treated and advised recheck urease breath test or stool antigen in 8 weeks, repeat colonoscopy 3 years.    Neurosurgery-Dr. Barbie Hou-last appointment 12/2023 for cervical disc herniation C6-7 with cervical myelopathy.  Discussed severity of disc herniation and compression of the cord with signs of cervical myelopathy including positive Mag sign, bilateral hyperreflexia and tricep reflexes, radiculopathy with pain and tingling but no weakness at the elbow extension weakness with shoulder abduction that could be related to a shoulder injury.  Recommended surgical intervention with discectomy C6-7 and fusion.  Ordered CT scan to evaluate calcification anteriorly.  He was undecided despite strong recommendation for surgery and wanted to wait and see surgeon in early January after CT.  No CT results in the chart.  No follow-up.  Orthopedic surgery-Dr. Clay Santos-last appointment 10/2023 for weakness right upper limb, pain in right shoulder, neck pain.  Appeared he had C5 versus C6 nerve root issue with weakness of the deltoid biceps and supraspinatus and infraspinatus.  Advised MRI cervical spine and follow-up with  neurosurgery.  With significant weakness in the right upper extremity, concern for nerve root distribution C5 versus C6.  Continue muscle relaxer and Tylenol.  I also ordered EMG/NCS right upper extremity to include the supraspinatus and infraspinatus as well as deltoid and biceps as well as the remainder of the upper extremity.  Given Medrol Dosepak and ibuprofen 800 mg.  Pulmonology- Dr Colon- last appt in follow-up of emphysema, pulmonary nodules, bronchiectasis, and periodic limb movement disorder.  CT 5/2021 was stable from 12/2019  They have discussed lung transplant consultation, however, patient does not want to have lung transplant and will not see the specialist at this time.changed Trelegy to Breztri. No other changes. Follow up 3 months.       The following portions of the patient's history were reviewed and updated as appropriate: allergies, current medications, past family history, past medical history, past social history, past surgical history and problem list.    Review of Systems   Respiratory:  Positive for shortness of breath.    Cardiovascular:  Positive for chest pain.   Gastrointestinal:  Positive for abdominal pain.   Neurological:  Positive for numbness.   Hematological:  Bruises/bleeds easily.   All other systems reviewed and are negative.      Objective    Vitals:    03/11/24 1254   BP: 178/90   Pulse: 81   Resp: 22   Temp: 97.8 °F (36.6 °C)   SpO2: (!) 83%   Body mass index is 24.35 kg/m².     Physical Exam   Constitutional: He is oriented to person, place, and time. He appears well-developed. No distress.   HENT:   Head: Normocephalic and atraumatic.   Right Ear: External ear normal.   Left Ear: External ear normal.   Eyes: Conjunctivae are normal.   Neck: Carotid bruit is not present. No tracheal deviation present. No thyroid mass and no thyromegaly present.   Cardiovascular: Normal rate, regular rhythm, normal heart sounds and normal pulses.   Pulmonary/Chest: Effort normal and  breath sounds normal.   Abdominal: Normal appearance.   Neurological: He is alert and oriented to person, place, and time. Gait normal.   Skin: Skin is warm and dry.   Psychiatric: His behavior is normal. Mood, judgment and thought content normal.   Nursing note and vitals reviewed.      Assessment & Plan   Diagnoses and all orders for this visit:    1. Acute on chronic respiratory failure with hypoxia (Primary)  -     CBC & Differential  -     Comprehensive Metabolic Panel    2. Bronchiectasis with acute lower respiratory infection    3. Chronic obstructive pulmonary disease, unspecified COPD type    4. Hypoxia    5. Oxygen dependent    6. Pulmonary nodules    7. Shortness of breath    8. MCKENZIE (obstructive sleep apnea)    9. Gastroesophageal reflux disease, unspecified whether esophagitis present  -     Ambulatory Referral to Gastroenterology    10. Epigastric pain  -     Ambulatory Referral to Gastroenterology    11. Hyponatremia  -     Comprehensive Metabolic Panel    12. Influenza A  -     CBC & Differential    13. Leukocytosis, unspecified type  -     CBC & Differential    14. Macrocytosis    15. Vitamin D deficiency  -     Comprehensive Metabolic Panel  -     Vitamin D,25-Hydroxy    16. Gastrointestinal intolerance to foods  -     pantoprazole (Protonix) 40 MG EC tablet; Take 1 tablet by mouth Daily.  Dispense: 30 tablet; Refill: 0        Assessment and Plan  Patient will have fasting labs. Call if no results in 1 week. Stability of conditions, plan, follow up, and further recommendations pending labs. Follow up in no more than 3 months if he is feeling well without concerns.     Influenza A, acute on chronic respiratory failure with hypoxia, pneumonia, hyponatremia, elevated troponin, elevated liver function test, elevated BUN, abnormal urinalysis-patient is improving clinically.  He continues with significant shortness of breath and mild hypoxia, however, his oxygen is remaining above 90% even with  ambulation.  We did get a low oxygen reading here but his oximeter with him is working well and reports 94% oxygen saturation and normal pulse.  He will continue to monitor closely, follow-up with pulmonology in 2 weeks, and will use oxygen for saturation 88 to 90% or lower or significant shortness of breath.  I will recheck labs today.  I will reach out to cardiology, as he was just seen with workup for chest pain with stress test and echocardiogram.  I will see if they would like to see him back in follow-up of increasing troponin with possible demand ischemia from influenza and pneumonia.  COPD, pulmonary nodules, bronchiectasis, PLMD- Continue follow up with pulmonology and treatment as directed by them.   Chest pain, labile blood pressure-Patient was seen in the emergency department 7/2023 for chest pain with radiation into his shoulders and elevated blood pressure pain was associated with diaphoresis.  He has history of left chest pain then had episode of pain with radiation to bilateral shoulders.  He was seen by cardiology and underwent stress test and echocardiogram that were negative with the exception of some valvular issues.  No follow-up scheduled.  Hypertension- BP is significantly elevated today but has been labile. Patient to continue Lisinopril 5 mg twice daily or 10 mg once daily per cardiology.  Will need to monitor blood pressure and call or return if elevated.   Leukocytosis- He was told this was from chronic prednisone use.  This is definitely a possibility.  I will continue to monitor and make recommendations.  Macrocytosis- Recheck and B12 and folate levels were ok 7/2021. I will continue to monitor.  Anxiety, adjustment reaction, grief reaction-Patient with episodes of anxiety. He has not necessarily wanted daily medication but something to take if he has an increased anxiety day. Patient was given Atarax as needed. He understands that he cannot drive, lift, work on medication. He should let  me know if he is using frequently and we will need to consider daily medication. He was previously on Celexa daily- we could consider this.he has had increased stress with custody of his 5 grandchildren and caring for his wife who had previous CVA and was paralyzed.  However, she then passed away Easter 2023.  I discussed with patient consideration of therapy/counseling and medication.  He will let me know if he is willing to consider this.  Skin lesions-He has skin lesions on his arms for couple months.  He reports his brother had similar lesions and had skin cancer.  There are numerous lesions that are difficult to determine.  There may be some actinic keratosis, however, there is also some component of trauma/picking.  I will give betamethasone to try on some of these areas and will refer to dermatology for evaluation and further treatment.  GERD, epigastric pain, LUQ abdominal pain- Patient had improvement in symptoms but has had significant worsening again.  He did have improvement in GI symptoms with Protonix in the hospital.  I will give Protonix 40 mg once daily while he is awaiting follow-up with GI, as he is overdue for follow-up with.    Cervical disc disorder with radiculopathy C6-7, weakness, paresthesias/ neuropathy- B12/ folate levels were ok.  We discussed additional workup at visit in the past.  He is now seeing orthopedist who ordered cervical imaging and sent to neurosurgery.  They have recommended cervical intervention with discectomy C6-7 and fusion.  They ordered CT scan to evaluate and advise surgery.  He wanted to wait until January and see them in follow-up but did not return in January.  He does have compression of the cord with signs of cervical myelopathy including positive Mag sign, bilateral hyperreflexia triceps reflex, radiculopathy with pain and tingling but no weakness with elbow extension, weakness shoulder abduction.  Patient should follow-up with neurosurgery as directed by  them.     I spent 35 minutes caring for Nguyễn Santana on this date of service. This time includes time spent by me in the following activities as necessary: preparing for the visit, reviewing tests, specialists records and previous visits, obtaining and/or reviewing a separately obtained history, performing a medically appropriate exam and/or evaluation, counseling and educating the patient, family, caregiver, referring and/or communicating with other healthcare professionals, documenting information in the medical record, independently interpreting results and communicating that information with the patient, family, caregiver, and developing a medically appropriate treatment plan with consideration of other conditions, medications, and treatments.

## 2024-03-12 LAB
25(OH)D3+25(OH)D2 SERPL-MCNC: 48.7 NG/ML (ref 30–100)
ALBUMIN SERPL-MCNC: 4.6 G/DL (ref 3.9–4.9)
ALBUMIN/GLOB SERPL: 2.2 {RATIO} (ref 1.2–2.2)
ALP SERPL-CCNC: 67 IU/L (ref 44–121)
ALT SERPL-CCNC: 32 IU/L (ref 0–44)
AST SERPL-CCNC: 19 IU/L (ref 0–40)
BASOPHILS # BLD AUTO: 0.1 X10E3/UL (ref 0–0.2)
BASOPHILS NFR BLD AUTO: 1 %
BILIRUB SERPL-MCNC: 0.9 MG/DL (ref 0–1.2)
BUN SERPL-MCNC: 17 MG/DL (ref 8–27)
BUN/CREAT SERPL: 17 (ref 10–24)
CALCIUM SERPL-MCNC: 11 MG/DL (ref 8.6–10.2)
CHLORIDE SERPL-SCNC: 101 MMOL/L (ref 96–106)
CO2 SERPL-SCNC: 22 MMOL/L (ref 20–29)
CREAT SERPL-MCNC: 1.03 MG/DL (ref 0.76–1.27)
EGFRCR SERPLBLD CKD-EPI 2021: 83 ML/MIN/1.73
EOSINOPHIL # BLD AUTO: 0.1 X10E3/UL (ref 0–0.4)
EOSINOPHIL NFR BLD AUTO: 1 %
ERYTHROCYTE [DISTWIDTH] IN BLOOD BY AUTOMATED COUNT: 13.5 % (ref 11.6–15.4)
GLOBULIN SER CALC-MCNC: 2.1 G/DL (ref 1.5–4.5)
GLUCOSE SERPL-MCNC: 71 MG/DL (ref 70–99)
HCT VFR BLD AUTO: 44.9 % (ref 37.5–51)
HGB BLD-MCNC: 15.7 G/DL (ref 13–17.7)
IMM GRANULOCYTES # BLD AUTO: 0.6 X10E3/UL (ref 0–0.1)
IMM GRANULOCYTES NFR BLD AUTO: 5 %
LYMPHOCYTES # BLD AUTO: 3 X10E3/UL (ref 0.7–3.1)
LYMPHOCYTES NFR BLD AUTO: 23 %
MCH RBC QN AUTO: 31.7 PG (ref 26.6–33)
MCHC RBC AUTO-ENTMCNC: 35 G/DL (ref 31.5–35.7)
MCV RBC AUTO: 91 FL (ref 79–97)
MONOCYTES # BLD AUTO: 1.5 X10E3/UL (ref 0.1–0.9)
MONOCYTES NFR BLD AUTO: 11 %
NEUTROPHILS # BLD AUTO: 7.5 X10E3/UL (ref 1.4–7)
NEUTROPHILS NFR BLD AUTO: 59 %
PLATELET # BLD AUTO: 394 X10E3/UL (ref 150–450)
POTASSIUM SERPL-SCNC: 4.7 MMOL/L (ref 3.5–5.2)
PROT SERPL-MCNC: 6.7 G/DL (ref 6–8.5)
RBC # BLD AUTO: 4.96 X10E6/UL (ref 4.14–5.8)
SODIUM SERPL-SCNC: 138 MMOL/L (ref 134–144)
WBC # BLD AUTO: 12.7 X10E3/UL (ref 3.4–10.8)

## 2024-03-12 NOTE — PROGRESS NOTES
ORGAN OFFER   [facility]# [id number]    Notified by Rehan Ram, , of cadaveric kidney offer for Paige Summers, MRN: [id number].  Spoke with patient, and patient reports she has a living donor transplant scheduled in April.  Refuses this organ offer.  Patient verbalized understanding of organ offer process and that there are no guarantee regarding her living donor.   RM:________     PCP: Vanesa Bernard PA    : 1960  AGE: 63 y.o.  EST PATIENT     REASON FOR VISIT/  CC:        BP Readings from Last 3 Encounters:   24 178/90   23 135/86   23 140/84      Wt Readings from Last 3 Encounters:   24 74.8 kg (165 lb)   23 76 kg (167 lb 8.8 oz)   23 75.8 kg (167 lb)        WT: ____________ BP: __________L __________R HR______    CHEST PAIN: _____________    SOA: _____________PALPS: _______________     LIGHTHEADED: ___________FATIGUE: ________________ EDEMA __________    ALLERGIES:Hydroxyzine, Levothyroxine, and Trelegy ellipta [fluticasone-umeclidin-vilant] SMOKING HISTORY:  Social History     Tobacco Use    Smoking status: Former     Current packs/day: 0.00     Average packs/day: 1 pack/day for 41.3 years (41.3 ttl pk-yrs)     Types: Cigarettes     Start date: 1974     Quit date: 2015     Years since quittin.7    Smokeless tobacco: Never    Tobacco comments:     Began smoking at age 12.  Smoked 1.5 ppd for 6 years and 1 ppd for 36 years for a 45 pack year history.  She says that she quit 3 years ago even though she reports still smoking 1 or 2 cigarettes per week.   Vaping Use    Vaping status: Former   Substance Use Topics    Alcohol use: Yes     Alcohol/week: 2.0 standard drinks of alcohol     Types: 2 Cans of beer per week     Comment: Drinks 1 or 2 beers    Drug use: No     CAFFEINE USE_________________  ALCOHOL ______________________

## 2024-03-13 ENCOUNTER — OFFICE VISIT (OUTPATIENT)
Age: 64
End: 2024-03-13
Payer: MEDICARE

## 2024-03-13 VITALS
WEIGHT: 163.4 LBS | BODY MASS INDEX: 24.2 KG/M2 | HEART RATE: 67 BPM | SYSTOLIC BLOOD PRESSURE: 152 MMHG | HEIGHT: 69 IN | DIASTOLIC BLOOD PRESSURE: 87 MMHG

## 2024-03-13 DIAGNOSIS — I10 PRIMARY HYPERTENSION: Primary | ICD-10-CM

## 2024-03-13 DIAGNOSIS — G47.33 OSA (OBSTRUCTIVE SLEEP APNEA): ICD-10-CM

## 2024-03-13 DIAGNOSIS — E78.5 DYSLIPIDEMIA: ICD-10-CM

## 2024-03-13 PROCEDURE — 1160F RVW MEDS BY RX/DR IN RCRD: CPT | Performed by: INTERNAL MEDICINE

## 2024-03-13 PROCEDURE — 99214 OFFICE O/P EST MOD 30 MIN: CPT | Performed by: INTERNAL MEDICINE

## 2024-03-13 PROCEDURE — 93000 ELECTROCARDIOGRAM COMPLETE: CPT | Performed by: INTERNAL MEDICINE

## 2024-03-13 PROCEDURE — 3079F DIAST BP 80-89 MM HG: CPT | Performed by: INTERNAL MEDICINE

## 2024-03-13 PROCEDURE — 3077F SYST BP >= 140 MM HG: CPT | Performed by: INTERNAL MEDICINE

## 2024-03-13 PROCEDURE — 1159F MED LIST DOCD IN RCRD: CPT | Performed by: INTERNAL MEDICINE

## 2024-03-13 RX ORDER — LEVOTHYROXINE SODIUM 112 UG/1
112 TABLET ORAL
COMMUNITY

## 2024-03-13 NOTE — PROGRESS NOTES
"      CARDIOLOGY    Luciana Ziegler MD    ENCOUNTER DATE:  03/13/2024    Nguyễn Santana / 63 y.o. / male        CHIEF COMPLAINT / REASON FOR OFFICE VISIT     Heart Problem      HISTORY OF PRESENT ILLNESS       HPI    Nguyễn Santana is a 63 y.o. male        I saw this patient back in 2015.  At that time he had a stress echo which showed normal LV function, no significant valve disease and no evidence of ischemia.     I saw him back in September 2023 due to elevated blood pressure.  He was complaining of numbness down his right arm.    He had an echocardiogram in 09/2023, which showed normal LV function, ejection fraction 61%, trace tricuspid regurgitation with a normal right ventricular systolic pressure. He had a nuclear stress test in 09/2023, which showed normal LV function and no evidence of ischemia.        He had a hospitalization for pneumonia.  He continues to notice wheezing but he stays active and takes care of 5 grandchildren.  His blood pressure at home is generally good running in the 120s over 70s.    REVIEW OF SYSTEMS     Review of Systems   Constitutional: Negative for chills, fever, weight gain and weight loss.   Cardiovascular:  Negative for leg swelling.   Respiratory:  Negative for cough, snoring and wheezing.    Hematologic/Lymphatic: Negative for bleeding problem. Does not bruise/bleed easily.   Skin:  Negative for color change.   Musculoskeletal:  Negative for falls, joint pain and myalgias.   Gastrointestinal:  Negative for melena.   Genitourinary:  Negative for hematuria.   Neurological:  Negative for excessive daytime sleepiness.   Psychiatric/Behavioral:  Negative for depression. The patient is not nervous/anxious.          VITAL SIGNS     Visit Vitals  /87   Pulse 67   Ht 175.3 cm (69.02\")   Wt 74.1 kg (163 lb 6.4 oz)   BMI 24.12 kg/m²         Wt Readings from Last 3 Encounters:   03/13/24 74.1 kg (163 lb 6.4 oz)   03/11/24 74.8 kg (165 lb)   09/20/23 76 kg (167 lb 8.8 oz) "     Body mass index is 24.12 kg/m².      PHYSICAL EXAMINATION     Constitutional:       General: Not in acute distress.  Neck:      Vascular: No carotid bruit or JVD.   Pulmonary:      Effort: Pulmonary effort is normal.      Breath sounds: Examination of the left-upper field reveals wheezing. Wheezing present.   Cardiovascular:      Normal rate. Regular rhythm.      Murmurs: There is no murmur.   Psychiatric:         Mood and Affect: Mood and affect normal.           REVIEWED DATA       ECG 12 Lead    Date/Time: 3/13/2024 10:31 AM  Performed by: Luciana Ziegler MD    Authorized by: Luciana Ziegler MD  Comparison: compared with previous ECG from 9/7/2023  Similar to previous ECG  Rhythm: sinus rhythm  BPM: 67  Conduction: conduction normal  ST Segments: ST segments normal  T Waves: T waves normal    Clinical impression: normal ECG            Lipid Panel          7/12/2023    08:59   Lipid Panel   Total Cholesterol 207    Triglycerides 117    HDL Cholesterol 75    VLDL Cholesterol 20    LDL Cholesterol  112    LDL/HDL Ratio 1.45        Lab Results   Component Value Date    GLUCOSE 71 03/11/2024    BUN 17 03/11/2024    CREATININE 1.03 03/11/2024    EGFRRESULT 83 03/11/2024    BCR 17 03/11/2024    K 4.7 03/11/2024    CO2 22 03/11/2024    CALCIUM 11.0 (H) 03/11/2024    PROTENTOTREF 6.7 03/11/2024    ALBUMIN 4.6 03/11/2024    BILITOT 0.9 03/11/2024    AST 19 03/11/2024    ALT 32 03/11/2024       ASSESSMENT & PLAN      Diagnosis Plan   1. Primary hypertension  ECG 12 Lead      2. Dyslipidemia  ECG 12 Lead      3. MCKENZIE (obstructive sleep apnea)  ECG 12 Lead          Hypertension. His blood pressure is elevated today but he says it is normally well controlled. He seems to be visually anxious to me, so I had Freida recheck his blood pressure but it still remained elevated. I have asked him to monitor it at home and let me know if he is running consistently greater than 130/85. I told him to call me if he is having chest  pain or tightness. He had a normal cardiac evaluation in September and I do not feel like he is having any symptoms that would warrant doing heart catheterization, but certainly if his symptoms get worse or change I would consider going in that direction. I also want him to check his blood pressure and let me know if it is running high since he is just on a very low dose of lisinopril and we could definitely go up on it.  He had an elevated troponin while hospitalized for pneumonia.  Hyperlipidemia. He is on lovastatin. His lipids are followed by his primary provider.   Obstructive sleep apnea.   COPD. Recent pneumonia. Not smoking.     Followup with me in 3 months.  Call me sooner if his symptoms change.        Orders Placed This Encounter   Procedures    ECG 12 Lead     This order was created via procedure documentation     Order Specific Question:   Release to patient     Answer:   Routine Release [[id number]]           MEDICATIONS         Discharge Medications            Accurate as of March 13, 2024 12:26 PM. If you have any questions, ask your nurse or doctor.                Changes to Medications        Instructions Start Date   albuterol (2.5 MG/3ML) 0.083% nebulizer solution  Commonly known as: PROVENTIL  What changed: additional instructions   2.5 mg, Nebulization, 4 Times Daily - RT, Patient will call when needed      albuterol 2 MG tablet  Commonly known as: PROVENTIL  What changed: Another medication with the same name was changed. Make sure you understand how and when to take each.   2 mg, Oral, 4 Times Daily      albuterol sulfate  (90 Base) MCG/ACT inhaler  Commonly known as: PROVENTIL HFA;VENTOLIN HFA;PROAIR HFA  What changed: Another medication with the same name was changed. Make sure you understand how and when to take each.   2 puffs, Inhalation, Every 4 Hours PRN             Continue These Medications        Instructions Start Date   aspirin 81 MG chewable tablet   81 mg, Oral, Daily       betamethasone valerate 0.1 % ointment  Commonly known as: VALISONE   1 application , Topical, 2 Times Daily      Breztri Aerosphere 160-9-4.8 MCG/ACT aerosol inhaler  Generic drug: Budeson-Glycopyrrol-Formoterol   2 puffs, Inhalation, Every 12 Hours      ibuprofen 800 MG tablet  Commonly known as: ADVIL,MOTRIN   TAKE 1 TABLET BY MOUTH THREE TIMES DAILY FOR 10 DAYS AS NEEDED FOR PAIN      Incruse Ellipta 62.5 MCG/ACT aerosol powder   Generic drug: Umeclidinium Bromide   1 puff, Inhalation, Daily      levothyroxine 112 MCG tablet  Commonly known as: SYNTHROID, LEVOTHROID   Taking 1 tablet 2 times a week.      Synthroid 100 MCG tablet  Generic drug: levothyroxine   Take 1 tab for 5 days a week.      levothyroxine 112 MCG tablet  Commonly known as: SYNTHROID, LEVOTHROID   112 mcg, Oral, ON SAT AND SUN ONLY      lisinopril 5 MG tablet  Commonly known as: PRINIVIL,ZESTRIL   5 mg, Oral, 2 Times Daily, Contact the office for appointment      lovastatin 10 MG tablet  Commonly known as: MEVACOR   10 mg, Oral, Every Night at Bedtime      montelukast 10 MG tablet  Commonly known as: SINGULAIR   10 mg, Oral, Daily, Patient will call when needed      Nebulizer device   Does not apply      O2  Commonly known as: OXYGEN   4 L/min, Inhalation, Once, 4 LPM HS only      pantoprazole 40 MG EC tablet  Commonly known as: Protonix   40 mg, Oral, Daily      predniSONE 10 MG tablet  Commonly known as: DELTASONE   10 mg, Oral, Daily      Tezspire 210 MG/1.91ML solution auto-injector  Generic drug: Tezepelumab-ekko   Subcutaneous      VITAMIN D PO   Oral, Weekly                 Luciana Ziegler MD  03/13/24  12:26 EDT    Part of this note may be an electronic transcription/translation of spoken language to printed text using the Dragon dictation system.

## 2024-03-27 ENCOUNTER — TELEPHONE (OUTPATIENT)
Dept: ENDOCRINOLOGY | Age: 64
End: 2024-03-27
Payer: MEDICARE

## 2024-03-27 NOTE — TELEPHONE ENCOUNTER
----- Message from Roxy Rogers MA sent at 3/26/2024 10:33 AM EDT -----  Please arrange for him to be seen in the next few weeks  ----- Message -----  From: Leonie Johansen APRN  Sent: 3/25/2024  12:51 PM EDT  To: ISABEL Rudolph; #    We have only been following patient for his thyroid  Next f/u isnt until June  It looks like PCP want us to evaluated his calcium levels  Please arrange for him to be seen in the next few weeks

## 2024-03-28 ENCOUNTER — OFFICE VISIT (OUTPATIENT)
Dept: GASTROENTEROLOGY | Facility: CLINIC | Age: 64
End: 2024-03-28
Payer: MEDICARE

## 2024-03-28 ENCOUNTER — TELEPHONE (OUTPATIENT)
Dept: GASTROENTEROLOGY | Facility: CLINIC | Age: 64
End: 2024-03-28

## 2024-03-28 ENCOUNTER — TELEPHONE (OUTPATIENT)
Dept: CARDIOLOGY | Facility: CLINIC | Age: 64
End: 2024-03-28
Payer: MEDICARE

## 2024-03-28 VITALS
HEIGHT: 69 IN | TEMPERATURE: 98.2 F | DIASTOLIC BLOOD PRESSURE: 88 MMHG | SYSTOLIC BLOOD PRESSURE: 162 MMHG | WEIGHT: 172.1 LBS | BODY MASS INDEX: 25.49 KG/M2 | HEART RATE: 73 BPM | OXYGEN SATURATION: 97 %

## 2024-03-28 DIAGNOSIS — I10 HYPERTENSION, UNSPECIFIED TYPE: ICD-10-CM

## 2024-03-28 DIAGNOSIS — K90.49 GASTROINTESTINAL INTOLERANCE TO FOODS: ICD-10-CM

## 2024-03-28 DIAGNOSIS — K21.9 GASTROESOPHAGEAL REFLUX DISEASE, UNSPECIFIED WHETHER ESOPHAGITIS PRESENT: Primary | ICD-10-CM

## 2024-03-28 RX ORDER — PANTOPRAZOLE SODIUM 40 MG/1
40 TABLET, DELAYED RELEASE ORAL DAILY
Qty: 90 TABLET | Refills: 3 | Status: SHIPPED | OUTPATIENT
Start: 2024-03-28 | End: 2024-03-28 | Stop reason: SDUPTHER

## 2024-03-28 RX ORDER — PANTOPRAZOLE SODIUM 40 MG/1
40 TABLET, DELAYED RELEASE ORAL DAILY
Qty: 90 TABLET | Refills: 3 | Status: SHIPPED | OUTPATIENT
Start: 2024-03-28

## 2024-03-28 RX ORDER — LISINOPRIL 10 MG/1
10 TABLET ORAL 2 TIMES DAILY
Qty: 180 TABLET | Refills: 3 | Status: SHIPPED | OUTPATIENT
Start: 2024-03-28

## 2024-03-28 NOTE — TELEPHONE ENCOUNTER
Recommendations called to pt.  Instructed to call with any further questions or concerns.  Verbalized understanding.  Pt states that he will increase lisinopril to 10mg twice a day, and continue to monitor BP.    Bonnie Escalante RN  [place] Nurse[place] [facility] [place]  03/28/24 13:57 EDT

## 2024-03-28 NOTE — PROGRESS NOTES
Chief Complaint   Patient presents with    Heartburn           History of Present Illness    Patient is a 63 y.o. who presents to the office for follow up evaluation.  Last in office visit was on  10/11/2022 with Dr. Jimenez for further evaluation of epigastric abdominal pain. Patient has a significant past medical history of GERD, colon polyps, hypothyroidism, and COPD.    12/14/2022 EGD and colonoscopy remarkable for:    Normal esophagus.  Gastritis.   Positive for h. pylori  Normal examined duodenum.    Colonoscopy:     Non-bleeding internal hemorrhoids.    One 5 mm polyp in the sigmoid colon  Biopsies: Tubulovillous adenoma   Next colonoscopy for colon cancer screening recommended in 5-year interval due 12/2027         On 2/28/2024, patient began developing upper congestion and fevers with sudden onset of severe upper abdominal pain on 3/1 which ultimately led to hospital admission for flu associated pneumonia at [facility].  He has since completed follow-up with primary care provider who restarted pantoprazole regimen on 3/11/2024.    Prior to 3/11/24  he had not been taking PPIs since completing eradication breath testing for H. pylori following 12/2022 endoscopic evaluation and describes symptoms as overall well-controlled without antireflux medications.    Since restarting pantoprazole 40 mg he reports epigastric abdominal pain as approximately 80% improved however he does intermittently experience epigastric abdominal pain/distal esophageal severe pain after consumption of both liquids and solids without known specific trigger.  This does not occur on a daily basis.    States that swallowed bolus often feels caught in this area and when posture is straightened swallowed bolus feels that it successfully transits into stomach and pain improves.  Denies nausea, vomiting.    Bowel habits are described as occurring regularly without evidence of melena or hematochezia.    Patient has positive family history of  "colon cancer in mother.   denies known family history of colon polyps or IBD.       Result Review :       Office Visit with Markos Jimenez MD (10/11/2022)   COLONOSCOPY (12/14/2022 12:29)  UPPER GI ENDOSCOPY (12/14/2022 12:29)  Tissue Pathology Exam (12/14/2022 12:36)   Urease For H Pylori - Tissue, Stomach (12/14/2022 12:40)  H. Pylori Breath Test - Breath, Lung (01/18/2023 13:21)  Vital Signs:   /88   Pulse 73   Temp 98.2 °F (36.8 °C)   Ht 175.3 cm (69\")   Wt 78.1 kg (172 lb 1.6 oz)   SpO2 97%   BMI 25.41 kg/m²     Body mass index is 25.41 kg/m².     Physical Exam  Vitals reviewed.   Constitutional:       General: He is not in acute distress.     Appearance: Normal appearance. He is not ill-appearing.   Eyes:      General: No scleral icterus.  Pulmonary:      Effort: Pulmonary effort is normal. No respiratory distress.   Abdominal:      General: Bowel sounds are normal. There is no distension.      Palpations: Abdomen is soft. Abdomen is not rigid. There is no mass or pulsatile mass.      Tenderness: There is no abdominal tenderness. There is no guarding or rebound.      Hernia: No hernia is present.   Skin:     Coloration: Skin is not jaundiced.   Neurological:      Mental Status: He is alert and oriented to person, place, and time.   Psychiatric:         Thought Content: Thought content normal.         Judgment: Judgment normal.           Assessment and Plan    Diagnoses and all orders for this visit:    1. Gastroesophageal reflux disease, unspecified whether esophagitis present (Primary)    2. Gastrointestinal intolerance to foods  -     pantoprazole (Protonix) 40 MG EC tablet; Take 1 tablet by mouth Daily.  Dispense: 90 tablet; Refill: 3           Discussion:    Patient presents for follow-up after recent hospitalization for further evaluation of a recurrence and epigastric abdominal pain.  Discussed recommendations to proceed with esophagram evaluation due to current respiratory status " following pneumonia diagnosis requiring use of at home oxygen which places patient at increased risk for receiving anesthesia.    After lengthy discussion, patient declines proceeding with esophagram evaluation at this time and would like to continue on pantoprazole in hopes of symptoms further improving with continued dosing.    We reviewed the importance of contacting our office in 4 weeks for symptomatic update and if symptoms persist would highly recommend proceeding with esophagram evaluation at that time or he is to contact us sooner for any new or worsening GI concerns for additional recommendations.    I have also provided a refill for pantoprazole for GERD.  Informed patient of next colonoscopy for colon cancer screening due in December 2027.    Patient is agreeable to the outlined above treatment plan.  Verbalizes understanding and will contact office for any new or worsening concerns.  All questions answered and support provided.        Patient Instructions   Next Colonoscopy due  12/2027    For GERD:    Follow antireflux precautions:    Continue pantoprazole 40 mg once daily prior to first meal of the day.  Send me an update in 4 weeks on symptom control with pantoprazole.  If at any point your symptoms worsen or do not continue to improve with the pantoprazole let me know so that esophagram can be performed to further assess.   Avoiding eating within 3 to 4 hours of bedtime.    Avoid foods that can trigger symptoms which may include:  citrus fruits  spicy foods,  Tomatoes  Red sauces   Chocolate  coffee/tea  caffeinated or carbonated beverages  alcohol         EMR Dragon/Transcription Disclaimer:  This document has been Dictated utilizing Dragon dictation.

## 2024-03-28 NOTE — TELEPHONE ENCOUNTER
Increase lisinopril to 10 mg twice a day.  Continue to monitor blood pressure.  I will send in a new prescription.

## 2024-03-28 NOTE — PATIENT INSTRUCTIONS
Next Colonoscopy due  12/2027    For GERD:    Follow antireflux precautions:    Continue pantoprazole 40 mg once daily prior to first meal of the day.  Send me an update in 4 weeks on symptom control with pantoprazole.  If at any point your symptoms worsen or do not continue to improve with the pantoprazole let me know so that esophagram can be performed to further assess.   Avoiding eating within 3 to 4 hours of bedtime.    Avoid foods that can trigger symptoms which may include:  citrus fruits  spicy foods,  Tomatoes  Red sauces   Chocolate  coffee/tea  caffeinated or carbonated beverages  alcohol

## 2024-03-28 NOTE — TELEPHONE ENCOUNTER
Patient calls asking that the RX for pantoprazole be resent to the corrected pharmacy in the chart, [facility] [place].

## 2024-03-28 NOTE — TELEPHONE ENCOUNTER
Pt called in to triage with complaints that his BP continues to be elevated after taking lisinopril.  He also states that he has some pressure behind his eyes at times when BP elevated, but otherwise denies any CP/SOA or other symptoms at this time.  First BP in AM is prior to meds.    3/23- 10a- 134/87, 64  12n- 132/71, 70  7p- 134/68, 78  10p- 115/70, 72    3/24- 9a- 131/75, 68  6p- 116/64, 81  10p- 113/70, 72    3/25- 6:30a- 154/85, 72  8a-146/88, 69  10a- 163/85, 74  12n- 151/84, 69  8p- 111/62, 78    3/26- 8a- 127/76, 69  10a- 152/83, 73  1:30p- 165/82, 72  8p- 120/82, 75  10p- 115/59, 72    3/27- 7a- 131/84, 62  10a- 152/79, 66  4p- 104/60, 79  8p- 107/63, 79  9:30p- 129/77, 72    3/28- 6a- 129/73, 70  9a- 153/87, 69    Recommendations?    Thanks so much,  Bonnie Escalante, RN  Triage RN  03/28/24 11:47 EDT

## 2024-03-29 ENCOUNTER — TELEPHONE (OUTPATIENT)
Dept: ENDOCRINOLOGY | Age: 64
End: 2024-03-29
Payer: MEDICARE

## 2024-03-29 RX ORDER — LOVASTATIN 10 MG/1
10 TABLET ORAL
Qty: 30 TABLET | Refills: 0 | Status: SHIPPED | OUTPATIENT
Start: 2024-03-29

## 2024-03-29 RX ORDER — LOVASTATIN 10 MG/1
10 TABLET ORAL
Qty: 90 TABLET | Refills: 1 | OUTPATIENT
Start: 2024-03-29

## 2024-03-29 NOTE — TELEPHONE ENCOUNTER
Incoming Refill Request      Medication requested (name and dose): LOVASTATIN 10 MG    Pharmacy where request should be sent: [facility]    Additional details provided by patient: REFILL REQUEST    Best call back number: [phone]    Does the patient have less than a 3 day supply:  [] Yes  [] No    Francy Mcdonalded Rep  03/29/24, 09:44 EDT

## 2024-03-29 NOTE — TELEPHONE ENCOUNTER
Rx Refill Note  Requested Prescriptions     Pending Prescriptions Disp Refills    lovastatin (MEVACOR) 10 MG tablet [Pharmacy Med Name: LOVASTATIN 10MG TABLETS] 90 tablet 1     Sig: TAKE 1 TABLET BY MOUTH EVERY NIGHT AT BEDTIME      Last office visit with prescribing clinician: 6/12/2023   Last telemedicine visit with prescribing clinician: Visit date not found   Next office visit with prescribing clinician: 4/4/2024                         Would you like a call back once the refill request has been completed: [] Yes [] No    If the office needs to give you a call back, can they leave a voicemail: [] Yes [] No    Antonia Todd MA  03/29/24, 11:14 EDT

## 2024-04-04 ENCOUNTER — OFFICE VISIT (OUTPATIENT)
Dept: ENDOCRINOLOGY | Age: 64
End: 2024-04-04
Payer: MEDICARE

## 2024-04-04 VITALS
OXYGEN SATURATION: 93 % | HEART RATE: 72 BPM | WEIGHT: 173 LBS | HEIGHT: 69 IN | DIASTOLIC BLOOD PRESSURE: 76 MMHG | SYSTOLIC BLOOD PRESSURE: 146 MMHG | BODY MASS INDEX: 25.62 KG/M2

## 2024-04-04 DIAGNOSIS — E83.52 HYPERCALCEMIA: Primary | ICD-10-CM

## 2024-04-04 PROCEDURE — 3078F DIAST BP <80 MM HG: CPT | Performed by: NURSE PRACTITIONER

## 2024-04-04 PROCEDURE — 3077F SYST BP >= 140 MM HG: CPT | Performed by: NURSE PRACTITIONER

## 2024-04-04 PROCEDURE — 99213 OFFICE O/P EST LOW 20 MIN: CPT | Performed by: NURSE PRACTITIONER

## 2024-04-04 RX ORDER — LOVASTATIN 10 MG/1
10 TABLET ORAL
Qty: 90 TABLET | Refills: 1 | Status: SHIPPED | OUTPATIENT
Start: 2024-04-04

## 2024-04-04 NOTE — PROGRESS NOTES
"Chief Complaint  Abnormal Calcium    Subjective        Nguyễn Santana presents to [facility] ENDOCRINOLOGY  History of Present Illness    Hypercalcemia, new  Only new medication started recently Tezpire which is causing known hand cramps and joint aches   Seeing cardiology for HTN, recent medication changes  Was admitted to the hospital 3/1-3/5/2024 with the flu and PNA  Denies urinary changes or s/s of dehydration   He is on vitamin d 50,000u weekly     Objective   Vital Signs:  /76 (BP Location: Left arm, Patient Position: Sitting)   Pulse 72   Ht 175.3 cm (69.02\")   Wt 78.5 kg (173 lb)   SpO2 93%   BMI 25.54 kg/m²   Estimated body mass index is 25.54 kg/m² as calculated from the following:    Height as of this encounter: 175.3 cm (69.02\").    Weight as of this encounter: 78.5 kg (173 lb).               Physical Exam  Vitals reviewed.   Constitutional:       General: He is not in acute distress.  HENT:      Head: Normocephalic and atraumatic.   Cardiovascular:      Rate and Rhythm: Normal rate.   Pulmonary:      Effort: Pulmonary effort is normal. No respiratory distress.   Musculoskeletal:         General: No signs of injury. Normal range of motion.      Cervical back: Normal range of motion and neck supple.   Skin:     General: Skin is warm and dry.   Neurological:      Mental Status: He is alert and oriented to person, place, and time. Mental status is at baseline.   Psychiatric:         Mood and Affect: Mood normal.         Behavior: Behavior normal.         Thought Content: Thought content normal.         Judgment: Judgment normal.        Result Review :    The following data was reviewed by: SHANT Watts on 04/04/2024:  Common labs          7/12/2023    08:59 7/26/2023    14:08 3/11/2024    13:42   Common Labs   Glucose 88   71    BUN 16   17    Creatinine 1.19   1.03    Sodium 139   138    Potassium 4.5   4.7    Chloride 106   101    Calcium 9.6   11.0    Total " Protein 6.3   6.7    Albumin 4.6   4.6    Total Bilirubin 0.7   0.9    Alkaline Phosphatase 64   67    AST (SGOT) 12   19    ALT (SGPT) 14   32    WBC 12.09  10.7  12.7    Hemoglobin 16.1  15.8  15.7    Hematocrit 47.0  46.6  44.9    Platelets 287  299  394    Total Cholesterol 207      Triglycerides 117      HDL Cholesterol 75      LDL Cholesterol  112                     Assessment and Plan     Diagnoses and all orders for this visit:    1. Hypercalcemia (Primary)  -     PTH, Intact  -     Calcium  -     Phosphorus  -     Magnesium    Other orders  -     lovastatin (MEVACOR) 10 MG tablet; Take 1 tablet by mouth every night at bedtime.  Dispense: 90 tablet; Refill: 1             Follow Up     No follow-ups on file.    Repeat labs today to confirm lab findings, next steps to follow      Patient was given instructions and counseling regarding his condition or for health maintenance advice. Please see specific information pulled into the AVS if appropriate.     SHANT Watts

## 2024-04-05 LAB
CALCIUM SERPL-MCNC: 9.5 MG/DL (ref 8.6–10.2)
MAGNESIUM SERPL-MCNC: 2.2 MG/DL (ref 1.6–2.3)
PHOSPHATE SERPL-MCNC: 3.9 MG/DL (ref 2.8–4.1)
PTH-INTACT SERPL-MCNC: 18 PG/ML (ref 15–65)

## 2024-04-15 ENCOUNTER — TELEPHONE (OUTPATIENT)
Dept: ENDOCRINOLOGY | Age: 64
End: 2024-04-15
Payer: MEDICARE

## 2024-04-15 NOTE — TELEPHONE ENCOUNTER
Left pt a message to return call.    OKAY FOR HUB AND  TO READ RESULTS.      Calcium levels back in range, no further action needed at this time  Will repeat again when with your next lab draw but currently no concerns with your calcium levels

## 2024-04-26 ENCOUNTER — TELEPHONE (OUTPATIENT)
Dept: CARDIOLOGY | Facility: CLINIC | Age: 64
End: 2024-04-26
Payer: MEDICARE

## 2024-04-26 NOTE — TELEPHONE ENCOUNTER
Patient called and left a voicemail stating he was seen in the ER due to having elevated blood pressure of 173/90. Patient was given Nitroglycerin tablets and was told to contact  to see what he should do as they also advised him to stop taking the lisinopril 10mg BID.

## 2024-04-26 NOTE — TELEPHONE ENCOUNTER
He needs a hospital discharge follow-up appointment.      Scheduling-please contact him to discuss next available appointment with Dr. Ziegler or next available NP for hospital follow-up.  We should see him in the next week please

## 2024-04-29 NOTE — TELEPHONE ENCOUNTER
"Caller: Nguyễn Santana \"NEHA\"    Relationship to patient: Self    Best call back number: [phone]    Type of visit: HOSPITAL FOLLOW UP    Additional notes: PT IS CALLING BECAUSE HE WENT TO THE ER LAST THURSDAY AND THEY TOLD HIM TO STOP TAKING HIS BLOOD PRESSURE MEDS UNTIL HE SPOKE WITH DR. DE JESUS. PLEASEADVISE  "

## 2024-04-30 NOTE — TELEPHONE ENCOUNTER
Is he checking his BP? What are his meds? He has a FU 6/18. May need to move this up and see a NP sooner

## 2024-04-30 NOTE — TELEPHONE ENCOUNTER
Spoke to patient. He was seen at [facility] on 4/25 because his BP was high. He said recently his lisinopril was increased to 20mg BID, but when he did that his BP just continued to become more elevated. When he went to the ER they gave him a nitro and it helped bring his BP down, but then his pulse dropped in the 40s. They watched him for an hour and then his pulse went back to normal and his BP stayed good so they let him go home. Currently he is not taking anything for BP only an 81mg aspirin nightly. Below are some readings he gave me. I did scheduled him for a hospital FU appointment tomorrow with Dorcas and told him if there was any further recommendations before his appointment tomorrow I would call him back.     4/30 6AM: 149/85  4/29 6AM: 154/86  4/29 12PM: 149/76  4/29 6PM: 125/74  4/29 9PM: 135/70    Faiza Swenson RN  Triage [facility] [place]

## 2024-05-01 ENCOUNTER — OFFICE VISIT (OUTPATIENT)
Dept: CARDIOLOGY | Facility: CLINIC | Age: 64
End: 2024-05-01
Payer: MEDICARE

## 2024-05-01 VITALS
DIASTOLIC BLOOD PRESSURE: 78 MMHG | SYSTOLIC BLOOD PRESSURE: 138 MMHG | BODY MASS INDEX: 25.92 KG/M2 | WEIGHT: 175 LBS | HEIGHT: 69 IN | HEART RATE: 72 BPM

## 2024-05-01 DIAGNOSIS — I10 PRIMARY HYPERTENSION: Primary | ICD-10-CM

## 2024-05-01 DIAGNOSIS — E78.5 DYSLIPIDEMIA: ICD-10-CM

## 2024-05-01 RX ORDER — AMLODIPINE BESYLATE 5 MG/1
5 TABLET ORAL DAILY
Qty: 90 TABLET | Refills: 3 | Status: SHIPPED | OUTPATIENT
Start: 2024-05-01

## 2024-05-01 NOTE — PROGRESS NOTES
Subjective:     Encounter Date:05/01/2024      Patient ID: Nguyễn Santana is a 63 y.o. male.    Chief Complaint: Follow-up hypertension  History of Present Illness  This is a 63-year-old man who follows with Dr. Ziegler and is new to me today.  He has a past medical history of hypertension, hyperlipidemia and obstructive sleep apnea.    He saw Dr. Ziegler in March 2024 and his blood pressure was noted to be elevated during that office visit.  He had had a normal cardiac evaluation in September so she did not feel that he needed any ischemic workup at the time.  She did ask him to check his blood pressure at home and call her back with his readings.  Already on lisinopril 5 mg at the time.  He called on March 28 with elevated readings that she increased his lisinopril to 10 mg daily.  He then called back a few weeks later and reported that his blood pressure was spiking after taking the increased dose of lisinopril.  It was further increased to 10 mg twice a day.  On April 25 he presented to the ED at [facility] with blood pressure 173/90.  He was given sublingual nitroglycerin and his blood pressure improved.  He was instructed to stop his lisinopril and follow-up with cardiology.    He is here today for his follow-up visit.  His blood pressure actually looks pretty decent today at 138/78.  He has been off of his lisinopril since he was seen in the emergency room.  He reports that when his blood pressure starts getting high he does develop some chest tightness and becomes quite anxious.  He denies any shortness of breath outside of his usual shortness of breath with COPD.  He does have pretty significant wheezing on exam today.  He denies any palpitations, dizziness or syncope.  He denies any swelling in his legs, orthopnea or PND.    I have reviewed and updated as appropriate allergies, current medications, past family history, past medical history, past surgical history and problem list.    Review of  Systems   Constitutional: Negative for fever, malaise/fatigue, weight gain and weight loss.   HENT:  Negative for congestion, hoarse voice and sore throat.    Eyes:  Negative for blurred vision and double vision.   Cardiovascular:  Negative for chest pain, dyspnea on exertion, leg swelling, orthopnea, palpitations and syncope.   Respiratory:  Positive for shortness of breath. Negative for cough and wheezing.    Gastrointestinal:  Negative for abdominal pain, hematemesis, hematochezia and melena.   Genitourinary:  Negative for dysuria and hematuria.   Neurological:  Negative for dizziness, headaches, light-headedness and numbness.   Psychiatric/Behavioral:  Negative for depression. The patient is not nervous/anxious.          Current Outpatient Medications:     albuterol (PROVENTIL) (2.5 MG/3ML) 0.083% nebulizer solution, Take 2.5 mg by nebulization 4 (Four) Times a Day. Patient will call when needed (Patient taking differently: Take 2.5 mg by nebulization 4 (Four) Times a Day. Patient taking 2 nebulization 4 times a day.  Patient will call when needed), Disp: 125 vial, Rfl: 6    albuterol (PROVENTIL) 2 MG tablet, Take 1 tablet by mouth 4 (Four) Times a Day., Disp: 120 tablet, Rfl: 6    albuterol sulfate  (90 Base) MCG/ACT inhaler, Inhale 2 puffs Every 4 (Four) Hours As Needed for Wheezing., Disp: 1 inhaler, Rfl: 5    aspirin 81 MG chewable tablet, Chew 1 tablet Daily., Disp: , Rfl:     betamethasone valerate (VALISONE) 0.1 % ointment, Apply 1 application  topically to the appropriate area as directed 2 (Two) Times a Day., Disp: 45 g, Rfl: 0    Breztri Aerosphere 160-9-4.8 MCG/ACT aerosol inhaler, Inhale 2 puffs Every 12 (Twelve) Hours., Disp: , Rfl:     ibuprofen (ADVIL,MOTRIN) 800 MG tablet, , Disp: , Rfl:     Incruse Ellipta 62.5 MCG/ACT aerosol powder , Inhale 1 puff Daily., Disp: , Rfl:     levothyroxine (SYNTHROID, LEVOTHROID) 112 MCG tablet, Taking 1 tablet 2 times a week., Disp: 15 tablet, Rfl: 9     levothyroxine (SYNTHROID, LEVOTHROID) 112 MCG tablet, Take 1 tablet by mouth. ON SAT AND SUN ONLY, Disp: , Rfl:     lovastatin (MEVACOR) 10 MG tablet, Take 1 tablet by mouth every night at bedtime., Disp: 90 tablet, Rfl: 1    montelukast (SINGULAIR) 10 MG tablet, Take 1 tablet by mouth Daily. Patient will call when needed, Disp: 30 tablet, Rfl: 3    O2 (OXYGEN), Inhale 4 L/min 1 (One) Time. 4 LPM HS only, Disp: , Rfl:     pantoprazole (Protonix) 40 MG EC tablet, Take 1 tablet by mouth Daily., Disp: 90 tablet, Rfl: 3    predniSONE (DELTASONE) 10 MG tablet, Take 1 tablet by mouth Daily., Disp: , Rfl:     Respiratory Therapy Supplies (NEBULIZER) device, , Disp: , Rfl:     Synthroid 100 MCG tablet, Take 1 tab for 5 days a week., Disp: 30 tablet, Rfl: 9    Tezepelumab-ekko (Tezspire) 210 MG/1.91ML solution auto-injector, Inject  under the skin into the appropriate area as directed., Disp: , Rfl:     VITAMIN D PO, Take  by mouth 1 (One) Time Per Week., Disp: , Rfl:     amLODIPine (NORVASC) 5 MG tablet, Take 1 tablet by mouth Daily., Disp: 90 tablet, Rfl: 3    lisinopril (PRINIVIL,ZESTRIL) 10 MG tablet, Take 1 tablet by mouth 2 (Two) Times a Day. Contact the office for appointment (Patient not taking: Reported on 5/1/2024), Disp: 180 tablet, Rfl: 3    Past Medical History:   Diagnosis Date    Allergic rhinitis     Anxiety     COPD (chronic obstructive pulmonary disease)     Depression     Diastolic dysfunction     Hyperlipidemia     Hypertension     Hypothyroidism     Hypoxia     On home oxygen therapy     Peripheral vascular disease     Pneumonia     Pulmonary nodule     Sleep apnea     Syncope     Vitamin D deficiency        Past Surgical History:   Procedure Laterality Date    COLONOSCOPY      COLONOSCOPY W/ POLYPECTOMY N/A 12/14/2022    Procedure: COLONOSCOPY FOR SCREENING;  Surgeon: Markos Jimenez MD;  Location: [facility];  Service: Gastroenterology;  Laterality: N/A;  sigmoid polyp x1 (cold snare  x1)  hemorrhoids    ENDOSCOPY N/A 2022    Procedure: ESOPHAGOGASTRODUODENOSCOPY WITH BIOPSY;  Surgeon: Markos Jimenez MD;  Location: [facility];  Service: Gastroenterology;  Laterality: N/A;  small bowel biopsy rule out Celiac Disease  gastritis   gastric biopsy  VIKI test      HYDROCELECTOMY      UPPER GASTROINTESTINAL ENDOSCOPY      VASECTOMY         Family History   Problem Relation Age of Onset    Anxiety disorder Mother     Depression Mother     Colon cancer Mother 73    Uterine cancer Mother     Cancer Mother         Colon.     Emphysema Father     Lung cancer Father     Stroke Father     Heart attack Maternal Grandmother     Stroke Maternal Grandmother     Aneurysm Maternal Grandfather     Heart attack Maternal Grandfather     Heart attack Paternal Grandmother     Heart attack Paternal Grandfather     Colon polyps Neg Hx     Crohn's disease Neg Hx     Irritable bowel syndrome Neg Hx     Ulcerative colitis Neg Hx        Social History     Tobacco Use    Smoking status: Former     Current packs/day: 0.00     Average packs/day: 1 pack/day for 41.3 years (41.3 ttl pk-yrs)     Types: Cigarettes     Start date: 1974     Quit date: 2015     Years since quittin.9    Smokeless tobacco: Never    Tobacco comments:     Began smoking at age 12.  Smoked 1.5 ppd for 6 years and 1 ppd for 36 years for a 45 pack year history.  She says that she quit 3 years ago even though she reports still smoking 1 or 2 cigarettes per week.   Vaping Use    Vaping status: Former   Substance Use Topics    Alcohol use: Yes     Alcohol/week: 2.0 standard drinks of alcohol     Types: 2 Cans of beer per week     Comment: Drinks 1 or 2 beers    Drug use: No         ECG 12 Lead    Date/Time: 2024 12:30 PM  Performed by: Dorcas Naidu APRN    Authorized by: Dorcas Naidu APRN  Comparison: compared with previous ECG from 3/13/2024  Similar to previous ECG  Rhythm: sinus rhythm  Conduction: right bundle branch  "block             Objective:     Visit Vitals  /78   Pulse 72   Ht 175.3 cm (69\")   Wt 79.4 kg (175 lb)   BMI 25.84 kg/m²             Physical Exam  Constitutional:       Appearance: Normal appearance. He is normal weight.   HENT:      Head: Normocephalic.   Neck:      Vascular: No carotid bruit.   Cardiovascular:      Rate and Rhythm: Normal rate and regular rhythm.      Chest Wall: PMI is not displaced.      Pulses: Normal pulses.           Radial pulses are 2+ on the right side and 2+ on the left side.        Posterior tibial pulses are 2+ on the right side and 2+ on the left side.      Heart sounds: Normal heart sounds. No murmur heard.     No friction rub. No gallop.   Pulmonary:      Effort: Pulmonary effort is normal.      Breath sounds: Normal breath sounds.   Abdominal:      General: Bowel sounds are normal. There is no distension.      Palpations: Abdomen is soft.   Musculoskeletal:      Right lower leg: No edema.      Left lower leg: No edema.   Skin:     General: Skin is warm and dry.      Capillary Refill: Capillary refill takes less than 2 seconds.   Neurological:      Mental Status: He is alert and oriented to person, place, and time.   Psychiatric:         Mood and Affect: Mood normal.         Behavior: Behavior normal.         Thought Content: Thought content normal.          Lab Review:   Lipid Panel          7/12/2023    08:59   Lipid Panel   Total Cholesterol 207    Triglycerides 117    HDL Cholesterol 75    VLDL Cholesterol 20    LDL Cholesterol  112    LDL/HDL Ratio 1.45          Cardiac Procedures:       Assessment:         Diagnoses and all orders for this visit:    1. Primary hypertension (Primary)    2. Dyslipidemia    Other orders  -     amLODIPine (NORVASC) 5 MG tablet; Take 1 tablet by mouth Daily.  Dispense: 90 tablet; Refill: 3  -     ECG 12 Lead            Plan:       HTN: will remain off lisinopril and start amlodipine 5 mg daily. Echocardiogram was pretty unremarkable in " September 2023 and stress test was non-ischemic. He does admit to using his rescue inhaler at least 4 times a day so this may be contributing to his new rise in BP. He is quite anxious too which is not helping. Hopefully the amlodipine will work for him and his anxiety will gone down.  Chest tightness: occurs when his BP is high. Would like to get better BP control and then, if chest pain persists, then we could consider further workup  COPD: He does use his rescue inhaler a lot. He should probably follow up with his PCP regarding this.    Thank you for allowing me to participate in this patient's care. Please call with any questions or concerns. Mr. Santana will follow up with Dr. Ziegler in June as previously scheduled.          Your medication list            Accurate as of May 1, 2024 12:31 PM. If you have any questions, ask your nurse or doctor.                START taking these medications        Instructions Last Dose Given Next Dose Due   amLODIPine 5 MG tablet  Commonly known as: NORVASC  Started by: SHANT Jewell      Take 1 tablet by mouth Daily.              CHANGE how you take these medications        Instructions Last Dose Given Next Dose Due   albuterol (2.5 MG/3ML) 0.083% nebulizer solution  Commonly known as: PROVENTIL  What changed: additional instructions      Take 2.5 mg by nebulization 4 (Four) Times a Day. Patient will call when needed       albuterol 2 MG tablet  Commonly known as: PROVENTIL  What changed: Another medication with the same name was changed. Make sure you understand how and when to take each.      Take 1 tablet by mouth 4 (Four) Times a Day.       albuterol sulfate  (90 Base) MCG/ACT inhaler  Commonly known as: PROVENTIL HFA;VENTOLIN HFA;PROAIR HFA  What changed: Another medication with the same name was changed. Make sure you understand how and when to take each.      Inhale 2 puffs Every 4 (Four) Hours As Needed for Wheezing.              CONTINUE taking these  medications        Instructions Last Dose Given Next Dose Due   aspirin 81 MG chewable tablet      Chew 1 tablet Daily.       betamethasone valerate 0.1 % ointment  Commonly known as: VALISONE      Apply 1 application  topically to the appropriate area as directed 2 (Two) Times a Day.       Breztri Aerosphere 160-9-4.8 MCG/ACT aerosol inhaler  Generic drug: Budeson-Glycopyrrol-Formoterol      Inhale 2 puffs Every 12 (Twelve) Hours.       ibuprofen 800 MG tablet  Commonly known as: ADVIL,MOTRIN           Incruse Ellipta 62.5 MCG/ACT aerosol powder   Generic drug: Umeclidinium Bromide      Inhale 1 puff Daily.       levothyroxine 112 MCG tablet  Commonly known as: SYNTHROID, LEVOTHROID      Taking 1 tablet 2 times a week.       Synthroid 100 MCG tablet  Generic drug: levothyroxine      Take 1 tab for 5 days a week.       levothyroxine 112 MCG tablet  Commonly known as: SYNTHROID, LEVOTHROID      Take 1 tablet by mouth. ON SAT AND SUN ONLY       lisinopril 10 MG tablet  Commonly known as: PRINIVIL,ZESTRIL      Take 1 tablet by mouth 2 (Two) Times a Day. Contact the office for appointment       lovastatin 10 MG tablet  Commonly known as: MEVACOR      Take 1 tablet by mouth every night at bedtime.       montelukast 10 MG tablet  Commonly known as: SINGULAIR      Take 1 tablet by mouth Daily. Patient will call when needed       Nebulizer device           O2  Commonly known as: OXYGEN      Inhale 4 L/min 1 (One) Time. 4 LPM HS only       pantoprazole 40 MG EC tablet  Commonly known as: Protonix      Take 1 tablet by mouth Daily.       predniSONE 10 MG tablet  Commonly known as: DELTASONE      Take 1 tablet by mouth Daily.       Tezspire 210 MG/1.91ML solution auto-injector  Generic drug: Tezepelumab-ekko      Inject  under the skin into the appropriate area as directed.       VITAMIN D PO      Take  by mouth 1 (One) Time Per Week.                 Where to Get Your Medications        These medications were sent to [id number] - [place], [place] - [address] - [phone]  - [phone] FX  [address] [facility], [place] [address]      Phone: [phone]   amLODIPine 5 MG tablet           Dorcas Naidu, SHANT  05/01/24  11:20 AM EDT

## 2024-05-22 ENCOUNTER — TELEPHONE (OUTPATIENT)
Dept: ENDOCRINOLOGY | Age: 64
End: 2024-05-22
Payer: MEDICARE

## 2024-05-22 DIAGNOSIS — E03.9 ACQUIRED HYPOTHYROIDISM: ICD-10-CM

## 2024-05-22 DIAGNOSIS — E83.52 HYPERCALCEMIA: Primary | ICD-10-CM

## 2024-05-22 NOTE — TELEPHONE ENCOUNTER
PT COMING IN FOR LABS ON 5/29/24, I SEE A T4, FREE LAB ORDER ONLY. IS THIS THE ONLY LAB YOU WANT THIS PT TO GET?    THANK YOU

## 2024-05-29 DIAGNOSIS — E03.9 ACQUIRED HYPOTHYROIDISM: ICD-10-CM

## 2024-05-29 DIAGNOSIS — E83.52 HYPERCALCEMIA: ICD-10-CM

## 2024-05-30 LAB
25(OH)D3+25(OH)D2 SERPL-MCNC: 51.7 NG/ML (ref 30–100)
BUN SERPL-MCNC: 16 MG/DL (ref 8–27)
BUN/CREAT SERPL: 15 (ref 10–24)
CALCIUM SERPL-MCNC: 9.5 MG/DL (ref 8.6–10.2)
CHLORIDE SERPL-SCNC: 100 MMOL/L (ref 96–106)
CO2 SERPL-SCNC: 24 MMOL/L (ref 20–29)
CREAT SERPL-MCNC: 1.1 MG/DL (ref 0.76–1.27)
EGFRCR SERPLBLD CKD-EPI 2021: 76 ML/MIN/1.73
GLUCOSE SERPL-MCNC: 77 MG/DL (ref 70–99)
POTASSIUM SERPL-SCNC: 4.7 MMOL/L (ref 3.5–5.2)
PTH-INTACT SERPL-MCNC: 23 PG/ML (ref 15–65)
SODIUM SERPL-SCNC: 140 MMOL/L (ref 134–144)
T4 FREE SERPL-MCNC: 1.82 NG/DL (ref 0.82–1.77)
TSH SERPL DL<=0.005 MIU/L-ACNC: 0.54 UIU/ML (ref 0.45–4.5)

## 2024-06-11 ENCOUNTER — OFFICE VISIT (OUTPATIENT)
Age: 64
End: 2024-06-11
Payer: MEDICARE

## 2024-06-11 VITALS
DIASTOLIC BLOOD PRESSURE: 80 MMHG | BODY MASS INDEX: 26.25 KG/M2 | HEIGHT: 69 IN | HEART RATE: 62 BPM | WEIGHT: 177.2 LBS | SYSTOLIC BLOOD PRESSURE: 158 MMHG

## 2024-06-11 DIAGNOSIS — I10 HYPERTENSION, UNSPECIFIED TYPE: Primary | ICD-10-CM

## 2024-06-11 DIAGNOSIS — R07.2 PRECORDIAL PAIN: ICD-10-CM

## 2024-06-11 PROCEDURE — 1160F RVW MEDS BY RX/DR IN RCRD: CPT | Performed by: INTERNAL MEDICINE

## 2024-06-11 PROCEDURE — 99214 OFFICE O/P EST MOD 30 MIN: CPT | Performed by: INTERNAL MEDICINE

## 2024-06-11 PROCEDURE — 1159F MED LIST DOCD IN RCRD: CPT | Performed by: INTERNAL MEDICINE

## 2024-06-11 PROCEDURE — 93000 ELECTROCARDIOGRAM COMPLETE: CPT | Performed by: INTERNAL MEDICINE

## 2024-06-11 PROCEDURE — 3077F SYST BP >= 140 MM HG: CPT | Performed by: INTERNAL MEDICINE

## 2024-06-11 PROCEDURE — 3079F DIAST BP 80-89 MM HG: CPT | Performed by: INTERNAL MEDICINE

## 2024-06-11 RX ORDER — ERGOCALCIFEROL 1.25 MG/1
1 CAPSULE ORAL WEEKLY
COMMUNITY
Start: 2024-06-05

## 2024-06-11 RX ORDER — BISOPROLOL FUMARATE 5 MG/1
5 TABLET, FILM COATED ORAL DAILY
Qty: 90 TABLET | Refills: 3 | Status: SHIPPED | OUTPATIENT
Start: 2024-06-11

## 2024-06-11 NOTE — PROGRESS NOTES
"      CARDIOLOGY    Luciana Ziegler MD    ENCOUNTER DATE:  06/11/2024    Nguyễn Santana / 63 y.o. / male        CHIEF COMPLAINT / REASON FOR OFFICE VISIT     Hypertension      HISTORY OF PRESENT ILLNESS       HPI    Nguyễn Santana is a 63 y.o. male     I saw this patient back in 2015.  At that time he had a stress echo which showed normal LV function, no significant valve disease and no evidence of ischemia.     I saw him back in September 2023 due to elevated blood pressure.  He was complaining of numbness down his right arm.     He had an echocardiogram in 09/2023, which showed normal LV function, ejection fraction 61%, trace tricuspid regurgitation with a normal right ventricular systolic pressure. He had a nuclear stress test in 09/2023, which showed normal LV function and no evidence of ischemia.      His blood pressure was elevated when I saw him in March 2024 and I increased the lisinopril to 10 mg twice daily.  He felt like he was having more chest pain with this and went to the emergency room at [facility] in April 2024.  He ruled out for myocardial infarction.  He was told to discontinue lisinopril.  He followed up with one of her nurse practitioners in the office and was started on amlodipine 5 mg a day.    He says his blood pressure continues to spike and when it spikes he feels some chest discomfort.  He feels hot and gets a headache and pressure buildup behind his eyes.  He does not know why his blood pressure spikes at times.  He said he recently was out working in the yard and felt good.       REVIEW OF SYSTEMS     ROS      VITAL SIGNS     Visit Vitals  /80   Pulse 62   Ht 175.3 cm (69\")   Wt 80.4 kg (177 lb 3.2 oz)   BMI 26.17 kg/m²         Wt Readings from Last 3 Encounters:   06/11/24 80.4 kg (177 lb 3.2 oz)   05/01/24 79.4 kg (175 lb)   04/04/24 78.5 kg (173 lb)     Body mass index is 26.17 kg/m².      PHYSICAL EXAMINATION     Constitutional:       General: Not in acute " distress.  Neck:      Vascular: No carotid bruit or JVD.   Pulmonary:      Effort: Pulmonary effort is normal.      Breath sounds: Normal breath sounds.   Cardiovascular:      Normal rate. Regular rhythm.      Murmurs: There is no murmur.   Psychiatric:         Mood and Affect: Mood and affect normal.           REVIEWED DATA       ECG 12 Lead    Date/Time: 6/11/2024 11:31 AM  Performed by: Luciana Ziegler MD    Authorized by: Luciana Ziegler MD  Comparison: compared with previous ECG from 5/1/2024  Similar to previous ECG  Rhythm: sinus rhythm  BPM: 62  Conduction: conduction normal  ST Segments: ST segments normal  T Waves: T waves normal    Clinical impression: normal ECG            Lipid Panel          7/12/2023    08:59   Lipid Panel   Total Cholesterol 207    Triglycerides 117    HDL Cholesterol 75    VLDL Cholesterol 20    LDL Cholesterol  112    LDL/HDL Ratio 1.45        Lab Results   Component Value Date    GLUCOSE 77 05/29/2024    BUN 16 05/29/2024    CREATININE 1.10 05/29/2024    EGFRRESULT 76 05/29/2024    BCR 15 05/29/2024    K 4.7 05/29/2024    CO2 24 05/29/2024    CALCIUM 9.5 05/29/2024    PROTENTOTREF 6.7 03/11/2024    ALBUMIN 4.6 03/11/2024    BILITOT 0.9 03/11/2024    AST 19 03/11/2024    ALT 32 03/11/2024       ASSESSMENT & PLAN      Diagnosis Plan   1. Hypertension, unspecified type  Duplex Renal Artery - Bilateral Complete CAR      2. Precordial pain  Duplex Renal Artery - Bilateral Complete CAR            Hypertension.  He had worsening chest pain and blood pressure spikes on lisinopril 10 mg twice daily and it was discontinued.  He has been on amlodipine 5 mg daily.  He brings in a list of his blood pressure and his blood pressures are not at goal.  I am concerned because lisinopril seem to trigger blood pressure spikes and Luke going to check a renal artery Doppler.  I am going to start him on bisoprolol 5 mg a day in addition to amlodipine 5 mg a day.  I am going to check a renal artery  Doppler.  I have asked him to continue to monitor his blood pressures at home, keep a list and bring them back for his follow-up appointment.   Hyperlipidemia. He is on lovastatin. His lipids are followed by his primary provider.   Obstructive sleep apnea.   COPD. Recent pneumonia. Not smoking.   Elevated troponin in the setting of pneumonia.He had a normal nuclear stress test in September 2023.  Echocardiogram September 2023 showed normal LV function, normal right ventricular systolic pressure.  He had a trip to the emergency room with chest pain and hypertension in April 2024.  Troponins were unremarkable.  He is not currently having chest pain or exertional symptoms.    Renal artery Doppler and follow-up with me in a month.    Orders Placed This Encounter   Procedures    ECG 12 Lead     This order was created via procedure documentation     Order Specific Question:   Release to patient     Answer:   Routine Release [[id number]]           MEDICATIONS         Discharge Medications            Accurate as of June 11, 2024 11:31 AM. If you have any questions, ask your nurse or doctor.                New Medications        Instructions Start Date   bisoprolol 5 MG tablet  Commonly known as: ZEBeta  Started by: Luciana Ziegler MD   5 mg, Oral, Daily             Changes to Medications        Instructions Start Date   albuterol (2.5 MG/3ML) 0.083% nebulizer solution  Commonly known as: PROVENTIL  What changed: additional instructions   2.5 mg, Nebulization, 4 Times Daily - RT, Patient will call when needed      albuterol 2 MG tablet  Commonly known as: PROVENTIL  What changed: Another medication with the same name was changed. Make sure you understand how and when to take each.   2 mg, Oral, 4 Times Daily      albuterol sulfate  (90 Base) MCG/ACT inhaler  Commonly known as: PROVENTIL HFA;VENTOLIN HFA;PROAIR HFA  What changed: Another medication with the same name was changed. Make sure you understand how and when  to take each.   2 puffs, Inhalation, Every 4 Hours PRN             Continue These Medications        Instructions Start Date   amLODIPine 5 MG tablet  Commonly known as: NORVASC   5 mg, Oral, Daily      aspirin 81 MG chewable tablet   81 mg, Oral, Daily      betamethasone valerate 0.1 % ointment  Commonly known as: VALISONE   1 application , Topical, 2 Times Daily      Breztri Aerosphere 160-9-4.8 MCG/ACT aerosol inhaler  Generic drug: Budeson-Glycopyrrol-Formoterol   2 puffs, Inhalation, Every 12 Hours      ibuprofen 800 MG tablet  Commonly known as: ADVIL,MOTRIN       levothyroxine 112 MCG tablet  Commonly known as: SYNTHROID, LEVOTHROID   Taking 1 tablet 2 times a week.      Synthroid 100 MCG tablet  Generic drug: levothyroxine   Take 1 tab for 5 days a week.      lovastatin 10 MG tablet  Commonly known as: MEVACOR   10 mg, Oral, Every Night at Bedtime      montelukast 10 MG tablet  Commonly known as: SINGULAIR   10 mg, Oral, Daily, Patient will call when needed      Nebulizer device   Does not apply      O2  Commonly known as: OXYGEN   4 L/min, Inhalation, Once, 4 LPM HS only      pantoprazole 40 MG EC tablet  Commonly known as: Protonix   40 mg, Oral, Daily      predniSONE 10 MG tablet  Commonly known as: DELTASONE   10 mg, Oral, Daily      Tezspire 210 MG/1.91ML solution auto-injector  Generic drug: Tezepelumab-ekko   Subcutaneous      vitamin D 1.25 MG (60445 UT) capsule capsule  Commonly known as: ERGOCALCIFEROL   1 capsule, Oral, Weekly      VITAMIN D PO   Oral, Weekly             Stop These Medications      lisinopril 10 MG tablet  Commonly known as: PRINIVIL,ZESTRIL  Stopped by: MD Luciana Chan MD  06/11/24  11:31 EDT    Part of this note may be an electronic transcription/translation of spoken language to printed text using the Dragon dictation system.

## 2024-06-12 ENCOUNTER — OFFICE VISIT (OUTPATIENT)
Dept: ENDOCRINOLOGY | Age: 64
End: 2024-06-12
Payer: MEDICARE

## 2024-06-12 VITALS
TEMPERATURE: 97.6 F | HEART RATE: 65 BPM | BODY MASS INDEX: 26.57 KG/M2 | WEIGHT: 179.4 LBS | HEIGHT: 69 IN | DIASTOLIC BLOOD PRESSURE: 80 MMHG | SYSTOLIC BLOOD PRESSURE: 128 MMHG | OXYGEN SATURATION: 96 %

## 2024-06-12 DIAGNOSIS — E03.9 ACQUIRED HYPOTHYROIDISM: Primary | ICD-10-CM

## 2024-06-12 DIAGNOSIS — E78.2 MIXED HYPERLIPIDEMIA: ICD-10-CM

## 2024-06-12 PROCEDURE — 3074F SYST BP LT 130 MM HG: CPT | Performed by: NURSE PRACTITIONER

## 2024-06-12 PROCEDURE — 3079F DIAST BP 80-89 MM HG: CPT | Performed by: NURSE PRACTITIONER

## 2024-06-12 PROCEDURE — 99214 OFFICE O/P EST MOD 30 MIN: CPT | Performed by: NURSE PRACTITIONER

## 2024-06-12 RX ORDER — LEVOTHYROXINE SODIUM 100 MCG
100 TABLET ORAL DAILY
Qty: 90 TABLET | Refills: 1 | Status: SHIPPED | OUTPATIENT
Start: 2024-06-12

## 2024-06-12 NOTE — PROGRESS NOTES
"Chief Complaint  Hypothyroidism    Subjective        Nguyễn Santana presents to [facility] ENDOCRINOLOGY  History of Present Illness    Hypothyroid, 2014   Currently on levothyroxine 100mcg 5 days a week and 112mcg 2 days a week   He has been having worsening HTN control   Hlp,mixed: lovastatin 10mg daily   Otherwise no additional questions concerns or complaints     Objective   Vital Signs:  /80 (BP Location: Left arm, Patient Position: Sitting)   Pulse 65   Temp 97.6 °F (36.4 °C) (Oral)   Ht 175.3 cm (69.02\")   Wt 81.4 kg (179 lb 6.4 oz)   SpO2 96%   BMI 26.48 kg/m²   Estimated body mass index is 26.48 kg/m² as calculated from the following:    Height as of this encounter: 175.3 cm (69.02\").    Weight as of this encounter: 81.4 kg (179 lb 6.4 oz).           Physical Exam  Vitals reviewed.   Constitutional:       General: He is not in acute distress.  HENT:      Head: Normocephalic and atraumatic.   Cardiovascular:      Rate and Rhythm: Normal rate.   Pulmonary:      Effort: Pulmonary effort is normal. No respiratory distress.   Musculoskeletal:         General: No signs of injury. Normal range of motion.      Cervical back: Normal range of motion and neck supple.   Skin:     General: Skin is warm and dry.   Neurological:      Mental Status: He is alert and oriented to person, place, and time. Mental status is at baseline.   Psychiatric:         Mood and Affect: Mood normal.         Behavior: Behavior normal.         Thought Content: Thought content normal.         Judgment: Judgment normal.        Result Review :    The following data was reviewed by: SHANT Watts on 06/12/2024:  Common labs          3/11/2024    13:42 4/4/2024    15:12 5/29/2024    12:19   Common Labs   Glucose 71   77    BUN 17   16    Creatinine 1.03   1.10    Sodium 138   140    Potassium 4.7   4.7    Chloride 101   100    Calcium 11.0  9.5  9.5    Total Protein 6.7      Albumin 4.6      Total " Bilirubin 0.9      Alkaline Phosphatase 67      AST (SGOT) 19      ALT (SGPT) 32      WBC 12.7      Hemoglobin 15.7      Hematocrit 44.9      Platelets 394                     Assessment and Plan     Diagnoses and all orders for this visit:    1. Acquired hypothyroidism (Primary)  -     Synthroid 100 MCG tablet; Take 1 tablet by mouth Daily. Stop 112mcg. Take 100mcg all 7 days of the wee  Dispense: 90 tablet; Refill: 1  -     TSH; Future  -     T4, Free; Future  -     T3, Free; Future    2. Mixed hyperlipidemia             Follow Up     Return in about 1 year (around 6/12/2025).    Change t4 dose to 100mcg daily given worsening HTN and elevated ft4  Repeat labs in 3m   Continue statin    Patient was given instructions and counseling regarding his condition or for health maintenance advice. Please see specific information pulled into the AVS if appropriate.     SHANT Watts

## 2024-06-20 ENCOUNTER — OFFICE VISIT (OUTPATIENT)
Dept: GASTROENTEROLOGY | Facility: CLINIC | Age: 64
End: 2024-06-20
Payer: MEDICARE

## 2024-06-20 ENCOUNTER — TELEPHONE (OUTPATIENT)
Dept: CARDIOLOGY | Facility: CLINIC | Age: 64
End: 2024-06-20
Payer: MEDICARE

## 2024-06-20 VITALS
HEIGHT: 69 IN | OXYGEN SATURATION: 95 % | SYSTOLIC BLOOD PRESSURE: 110 MMHG | HEART RATE: 48 BPM | BODY MASS INDEX: 26.59 KG/M2 | DIASTOLIC BLOOD PRESSURE: 70 MMHG | WEIGHT: 179.5 LBS | TEMPERATURE: 97.1 F

## 2024-06-20 DIAGNOSIS — R10.10 UPPER ABDOMINAL PAIN: ICD-10-CM

## 2024-06-20 DIAGNOSIS — K21.9 GASTROESOPHAGEAL REFLUX DISEASE, UNSPECIFIED WHETHER ESOPHAGITIS PRESENT: Primary | ICD-10-CM

## 2024-06-20 NOTE — TELEPHONE ENCOUNTER
Pt  wants to know if should continue taking his Bisoprolol 5 mg because he feels that it is causing his HR to decrease. He says his HR was 48 at his Gastro visit.     Please Advise

## 2024-06-20 NOTE — TELEPHONE ENCOUNTER
Triage-please explain that bisoprolol lowers resting pulse.  His pulse was 62 at rest when he was last here in the office and resting pulse is easily lowered by at least 10 points with this medicine.  I would like for him to keep a daily blood pressure and pulse log and track his blood pressure and pulse at least 1-2 hours after bisoprolol.  Providing he is not having any new or worsening dizziness or lightheadedness since starting bisoprolol then I would want him to continue current regimen until his follow-up in 3 weeks

## 2024-06-20 NOTE — PATIENT INSTRUCTIONS
For GERD:    Follow antireflux precautions:    Continue pantoprazole 40 mg once daily prior to first meal of the day  Avoiding eating within 3 to 4 hours of bedtime.    Avoid foods that can trigger symptoms which may include:  citrus fruits  spicy foods,  Tomatoes  Red sauces   Chocolate  coffee/tea  caffeinated or carbonated beverages  alcohol    Scheduling of your Ordered Diagnostic Tests- ultrasound of gallbladder  :    A team member from [facility] scheduling department will contact you to schedule your tests.    You can also contact them directly at [phone].    TRY To Help with Reduce your upper GI symptoms:    FDgard can be purchased over the counter. This may be useful for symptom relief.      Take 1-2 capsules as needed 30 minutes before meals up to 3 times daily.  Do not exceed 8 capsules per day.    If FD guard is not helpful, then can try:    For nausea, bloating and fullness     Ginger has been helpful for some patients.  Gingerroot capsules can be purchased over-the-counter.    Directions: Ginger root 500 mg (or 550 mg) capsules, take 1 to 2 capsules 3 times daily before meals, max 6 capsules per day.

## 2024-06-20 NOTE — PROGRESS NOTES
Chief Complaint   Patient presents with    Abdominal Pain           History of Present Illness    Patient is a 63 y.o. who presents to the office for follow up evaluation.  Last in office visit was on  3/28/2024 . Patient has a significant past medical history of GERD, colon polyps, hypothyroidism, and COPD.     12/14/2022 EGD and colonoscopy remarkable for:     Normal esophagus.  Gastritis.   Positive for h. pylori  Normal examined duodenum.     Colonoscopy:      Non-bleeding internal hemorrhoids.    One 5 mm polyp in the sigmoid colon  Biopsies: Tubulovillous adenoma   Next colonoscopy for colon cancer screening recommended in 5-year interval due 12/2027    For GERD, patient continues pantoprazole 40 mg once daily prior to first meal of the day.  Up until approximately 1 week ago symptoms have been controlled without recurrence.   However states that after consuming Aden's he has been experiencing upper abdominal pain and fullness with temporary improvement experienced with use of Pepto-Bismol as needed.    Denies nausea, vomiting, or dysphagia.  States that upper abdominal pain was isolated to his epigastric area and associated with belching without regurgitation.  Denies chest pain or shortness of breath.  Symptom onset also correlated with starting Bisoprolol which he feels is possibly contributing to onset after discussing with pulmonologist at recent in office visit.    Bowel habits are described as occurring regularly without evidence of melena, hematochezia, or steatorrhea.    Denies unintentional weight loss.  No use of NSAIDs, alcohol, or tobacco.    Patient has positive family history of colon cancer in mother.   denies known family history of colon polyps or IBD.       Result Review :       Office Visit with Nany Menon APRN (03/28/2024)   Office Visit with Markos Jimenez MD (10/11/2022)   COLONOSCOPY (12/14/2022 12:29)  UPPER GI ENDOSCOPY (12/14/2022 12:29)  Tissue Pathology Exam  "(12/14/2022 12:36)   Urease For H Pylori - Tissue, Stomach (12/14/2022 12:40)  H. Pylori Breath Test - Breath, Lung (01/18/2023 13:21)  T3, Free (06/12/2024 12:36)  CBC (NO DIFF) (04/25/2024 14:13)  COMPREHENSIVE METABOLIC PANEL (04/25/2024 14:13)  Vital Signs:   /70   Pulse (!) 48   Temp 97.1 °F (36.2 °C)   Ht 175.3 cm (69.02\")   Wt 81.4 kg (179 lb 8 oz)   SpO2 95%   BMI 26.50 kg/m²     Body mass index is 26.5 kg/m².     Physical Exam  Vitals reviewed.   Constitutional:       General: He is not in acute distress.     Appearance: Normal appearance. He is well-developed. He is not diaphoretic.   HENT:      Head: Normocephalic and atraumatic.   Eyes:      General: No scleral icterus.  Cardiovascular:      Rate and Rhythm: Normal rate and regular rhythm.   Pulmonary:      Effort: Pulmonary effort is normal. No respiratory distress.      Breath sounds: Normal breath sounds.   Abdominal:      General: Bowel sounds are normal. There is no distension.      Palpations: Abdomen is soft. There is no mass.      Tenderness: There is no abdominal tenderness. There is no guarding or rebound.      Hernia: No hernia is present.   Skin:     General: Skin is warm and dry.      Coloration: Skin is not jaundiced.   Neurological:      Mental Status: He is alert and oriented to person, place, and time.   Psychiatric:         Mood and Affect: Mood normal.         Behavior: Behavior normal.         Thought Content: Thought content normal.         Judgment: Judgment normal.           Assessment and Plan    Diagnoses and all orders for this visit:    1. Gastroesophageal reflux disease, unspecified whether esophagitis present (Primary)    2. Upper abdominal pain  -     US Abdomen Limited; Future           Discussion:    Patient is a pleasant 63-year-old male who presents today for follow-up evaluation.  Discussed recommendations to proceed with pantoprazole 40 mg once daily dosing as well as proceed with gastric emptying study to " assess for evidence of delayed gastric emptying.    At this time patient would like to postpone gastric emptying study if possible however he is agreeable to proceeding with ultrasound to assess for biliary etiology to symptoms.  I have provided samples for FDgard to be taking 1 to 2 capsules prior to meals or as needed for symptomatic relief no more than 6 capsules/day.    During this time I have also encouraged him to consume smaller more frequent meals that are easy to digest to help improve gastric motility and reduce onset of symptoms.  If FDgard is not successful in improving symptoms he will start gingerroot over-the-counter supplementation 550 mg prior to meals.    He will provide update to our office in 4 weeks or sooner if symptoms begin to worsen on current treatment plan with consideration of GES testing at that time.  Could consider updating EGD evaluation if testing negative and symptoms persist.    Patient is agreeable to the outlined above treatment plan.  Verbalizes understanding and will contact office for any new or worsening concerns.  All questions answered and support provided.        Patient Instructions     For GERD:    Follow antireflux precautions:    Continue pantoprazole 40 mg once daily prior to first meal of the day  Avoiding eating within 3 to 4 hours of bedtime.    Avoid foods that can trigger symptoms which may include:  citrus fruits  spicy foods,  Tomatoes  Red sauces   Chocolate  coffee/tea  caffeinated or carbonated beverages  alcohol    Scheduling of your Ordered Diagnostic Tests- ultrasound of gallbladder  :    A team member from [facility] scheduling department will contact you to schedule your tests.    You can also contact them directly at [phone].    TRY To Help with Reduce your upper GI symptoms:    FDgard can be purchased over the counter. This may be useful for symptom relief.      Take 1-2 capsules as needed 30 minutes before meals up to 3 times  daily.  Do not exceed 8 capsules per day.    If FD guard is not helpful, then can try:    For nausea, bloating and fullness     Divya has been helpful for some patients.  Gingerroot capsules can be purchased over-the-counter.    Directions: Divya root 500 mg (or 550 mg) capsules, take 1 to 2 capsules 3 times daily before meals, max 6 capsules per day.              EMR Dragon/Transcription Disclaimer:  This document has been Dictated utilizing Dragon dictation.

## 2024-06-20 NOTE — TELEPHONE ENCOUNTER
Reviewed recommendations with patient, verbalized understanding, will call with any further questions or complaints.    Kymberly- pt states that he has a little bit of lightheadedness sometimes when getting up, but that this is not new or worsening for him.  He states that he will monitor BP/HR as recommended, and call back in with any worsening symptoms.    Bonnie Escalante RN  Triage Nurse  06/20/24 13:59 EDT

## 2024-07-03 NOTE — PROGRESS NOTES
RM:________     PCP: Vanesa Bernard PA    : 1960  AGE: 63 y.o.  EST PATIENT     REASON FOR VISIT/  CC:        BP Readings from Last 3 Encounters:   24 110/70   24 128/80   24 158/80      Wt Readings from Last 3 Encounters:   24 81.4 kg (179 lb 8 oz)   24 81.4 kg (179 lb 6.4 oz)   24 80.4 kg (177 lb 3.2 oz)        WT: ____________ BP: __________L __________R HR______    CHEST PAIN: _____________    SOA: _____________PALPS: _______________     LIGHTHEADED: ___________FATIGUE: ________________ EDEMA __________    ALLERGIES:Hydroxyzine, Levothyroxine, and Trelegy ellipta [fluticasone-umeclidin-vilant] SMOKING HISTORY:  Social History     Tobacco Use    Smoking status: Former     Current packs/day: 0.00     Average packs/day: 1 pack/day for 41.3 years (41.3 ttl pk-yrs)     Types: Cigarettes     Start date: 1974     Quit date: 2015     Years since quittin.0    Smokeless tobacco: Never    Tobacco comments:     Began smoking at age 12.  Smoked 1.5 ppd for 6 years and 1 ppd for 36 years for a 45 pack year history.  She says that she quit 3 years ago even though she reports still smoking 1 or 2 cigarettes per week.   Vaping Use    Vaping status: Former   Substance Use Topics    Alcohol use: Yes     Alcohol/week: 2.0 standard drinks of alcohol     Types: 2 Cans of beer per week     Comment: Drinks 1 or 2 beers    Drug use: No     CAFFEINE USE_________________  ALCOHOL ______________________     5

## 2024-07-09 ENCOUNTER — OFFICE VISIT (OUTPATIENT)
Age: 64
End: 2024-07-09
Payer: MEDICARE

## 2024-07-09 VITALS
DIASTOLIC BLOOD PRESSURE: 76 MMHG | HEIGHT: 69 IN | BODY MASS INDEX: 26.63 KG/M2 | SYSTOLIC BLOOD PRESSURE: 132 MMHG | HEART RATE: 50 BPM | WEIGHT: 179.8 LBS

## 2024-07-09 DIAGNOSIS — I10 PRIMARY HYPERTENSION: Primary | ICD-10-CM

## 2024-07-09 DIAGNOSIS — E78.49 OTHER HYPERLIPIDEMIA: ICD-10-CM

## 2024-07-09 DIAGNOSIS — G47.33 OSA (OBSTRUCTIVE SLEEP APNEA): ICD-10-CM

## 2024-07-09 PROCEDURE — 1159F MED LIST DOCD IN RCRD: CPT | Performed by: INTERNAL MEDICINE

## 2024-07-09 PROCEDURE — 1160F RVW MEDS BY RX/DR IN RCRD: CPT | Performed by: INTERNAL MEDICINE

## 2024-07-09 PROCEDURE — 99213 OFFICE O/P EST LOW 20 MIN: CPT | Performed by: INTERNAL MEDICINE

## 2024-07-09 PROCEDURE — 3078F DIAST BP <80 MM HG: CPT | Performed by: INTERNAL MEDICINE

## 2024-07-09 PROCEDURE — 93000 ELECTROCARDIOGRAM COMPLETE: CPT | Performed by: INTERNAL MEDICINE

## 2024-07-09 PROCEDURE — 3075F SYST BP GE 130 - 139MM HG: CPT | Performed by: INTERNAL MEDICINE

## 2024-07-09 NOTE — PROGRESS NOTES
"      CARDIOLOGY    Luciana Ziegler MD    ENCOUNTER DATE:  07/09/2024    Nguyễn Santana / 63 y.o. / male        CHIEF COMPLAINT / REASON FOR OFFICE VISIT     Hypertension      HISTORY OF PRESENT ILLNESS       HPI    Nguyễn Santana is a 63 y.o. male     I saw this patient back in 2015.  At that time he had a stress echo which showed normal LV function, no significant valve disease and no evidence of ischemia.     I saw him back in September 2023 due to elevated blood pressure.  He was complaining of numbness down his right arm.     He had an echocardiogram in 09/2023, which showed normal LV function, ejection fraction 61%, trace tricuspid regurgitation with a normal right ventricular systolic pressure. He had a nuclear stress test in 09/2023, which showed normal LV function and no evidence of ischemia.       His blood pressure was elevated when I saw him in March 2024 and I increased the lisinopril to 10 mg twice daily.  He felt like he was having more chest pain with this and went to the emergency room at [facility] in April 2024.  He ruled out for myocardial infarction.  He was told to discontinue lisinopril.  He followed up with one of her nurse practitioners in the office and was started on amlodipine 5 mg a day.     The Doppler scheduled for next week.  He is not having chest pain or shortness of breath.  He separates the times that he takes the amlodipine and the bisoprolol because otherwise he feels like his heart rate gets too slow and he does not feel well with it.    VITAL SIGNS     Visit Vitals  /76 (BP Location: Left arm)   Pulse 50   Ht 175.3 cm (69.02\")   Wt 81.6 kg (179 lb 12.8 oz)   BMI 26.54 kg/m²         Wt Readings from Last 3 Encounters:   07/09/24 81.6 kg (179 lb 12.8 oz)   06/20/24 81.4 kg (179 lb 8 oz)   06/12/24 81.4 kg (179 lb 6.4 oz)     Body mass index is 26.54 kg/m².      PHYSICAL EXAMINATION     Constitutional:       General: Not in acute distress.  Neck:      " Vascular: No carotid bruit or JVD.   Pulmonary:      Effort: Pulmonary effort is normal.      Breath sounds: Normal breath sounds.   Cardiovascular:      Normal rate. Regular rhythm.      Murmurs: There is no murmur.   Psychiatric:         Mood and Affect: Mood and affect normal.           REVIEWED DATA       ECG 12 Lead    Date/Time: 7/9/2024 2:02 PM  Performed by: Luciana Ziegler MD    Authorized by: Luciana Ziegler MD  Comparison: compared with previous ECG from 6/11/2024  Similar to previous ECG  Rhythm: sinus rhythm  BPM: 50  Conduction: conduction normal  ST Segments: ST segments normal  T Waves: T waves normal    Clinical impression: normal ECG                Lab Results   Component Value Date    GLUCOSE 77 05/29/2024    BUN 16 05/29/2024    CREATININE 1.10 05/29/2024    EGFRRESULT 76 05/29/2024    BCR 15 05/29/2024    K 4.7 05/29/2024    CO2 24 05/29/2024    CALCIUM 9.5 05/29/2024    PROTENTOTREF 6.7 03/11/2024    ALBUMIN 4.6 03/11/2024    BILITOT 0.9 03/11/2024    AST 19 03/11/2024    ALT 32 03/11/2024       ASSESSMENT & PLAN      Diagnosis Plan   1. Primary hypertension        2. Other hyperlipidemia        3. MCKENZIE (obstructive sleep apnea)              Hypertension.  His blood pressure is doing well on his current regimen.  He brought in a list which I reviewed.  Nothing to make any changes.  He has a renal Doppler coming up later this week so one of my nurses or I will go over the results when they are available.  I does have some concerns since his blood pressure was spiking on lisinopril.    Hyperlipidemia. He is on lovastatin. His lipids are followed by his primary provider.   Obstructive sleep apnea.   COPD. Recent pneumonia. Not smoking.   Elevated troponin in the setting of pneumonia.He had a normal nuclear stress test in September 2023.  Echocardiogram September 2023 showed normal LV function, normal right ventricular systolic pressure.  He had a trip to the emergency room with chest pain and  hypertension in April 2024.  Troponins were unremarkable.  He is not currently having chest pain or exertional symptoms.    Follow-up in 6 months or sooner if his blood pressure becomes problematic.         Orders Placed This Encounter   Procedures    ECG 12 Lead     This order was created via procedure documentation     Order Specific Question:   Release to patient     Answer:   Routine Release [[id number]]           MEDICATIONS         Discharge Medications            Accurate as of July 9, 2024  2:06 PM. If you have any questions, ask your nurse or doctor.                Changes to Medications        Instructions Start Date   albuterol (2.5 MG/3ML) 0.083% nebulizer solution  Commonly known as: PROVENTIL  What changed: additional instructions   2.5 mg, Nebulization, 4 Times Daily - RT, Patient will call when needed      albuterol 2 MG tablet  Commonly known as: PROVENTIL  What changed: Another medication with the same name was changed. Make sure you understand how and when to take each.   2 mg, Oral, 4 Times Daily      albuterol sulfate  (90 Base) MCG/ACT inhaler  Commonly known as: PROVENTIL HFA;VENTOLIN HFA;PROAIR HFA  What changed: Another medication with the same name was changed. Make sure you understand how and when to take each.   2 puffs, Inhalation, Every 4 Hours PRN             Continue These Medications        Instructions Start Date   amLODIPine 5 MG tablet  Commonly known as: NORVASC   5 mg, Oral, Daily      aspirin 81 MG chewable tablet   81 mg, Oral, Daily      betamethasone valerate 0.1 % ointment  Commonly known as: VALISONE   1 application , Topical, 2 Times Daily      bisoprolol 5 MG tablet  Commonly known as: ZEBeta   5 mg, Oral, Daily      Breztri Aerosphere 160-9-4.8 MCG/ACT aerosol inhaler  Generic drug: Budeson-Glycopyrrol-Formoterol   2 puffs, Inhalation, Every 12 Hours      lovastatin 10 MG tablet  Commonly known as: MEVACOR   10 mg, Oral, Every Night at Bedtime      montelukast 10  MG tablet  Commonly known as: SINGULAIR   10 mg, Oral, Daily, Patient will call when needed      Nebulizer device   Does not apply      O2  Commonly known as: OXYGEN   4 L/min, Inhalation, Once, 4 LPM HS only      pantoprazole 40 MG EC tablet  Commonly known as: Protonix   40 mg, Oral, Daily      predniSONE 10 MG tablet  Commonly known as: DELTASONE   10 mg, Oral, Daily      Synthroid 100 MCG tablet  Generic drug: levothyroxine   100 mcg, Oral, Daily, Stop 112mcg. Take 100mcg all 7 days of the wee      Tezspire 210 MG/1.91ML solution auto-injector  Generic drug: Tezepelumab-ekko   Subcutaneous, Every 30 Days      TUSSIN CF COUGH & COLD PO   Oral, Every Morning, As needed      vitamin D 1.25 MG (99190 UT) capsule capsule  Commonly known as: ERGOCALCIFEROL   1 capsule, Oral, Weekly                 Luciana Ziegler MD  07/09/24  14:06 EDT    Part of this note may be an electronic transcription/translation of spoken language to printed text using the Dragon dictation system.

## 2024-07-11 ENCOUNTER — HOSPITAL ENCOUNTER (OUTPATIENT)
Dept: CARDIOLOGY | Facility: HOSPITAL | Age: 64
Discharge: HOME | End: 2024-07-11
Payer: MEDICARE

## 2024-07-11 ENCOUNTER — HOSPITAL ENCOUNTER (OUTPATIENT)
Dept: ULTRASOUND IMAGING | Facility: HOSPITAL | Age: 64
Discharge: HOME | End: 2024-07-11
Payer: MEDICARE

## 2024-07-11 ENCOUNTER — TELEPHONE (OUTPATIENT)
Dept: CARDIOLOGY | Facility: CLINIC | Age: 64
End: 2024-07-11
Payer: MEDICARE

## 2024-07-11 DIAGNOSIS — I10 HYPERTENSION, UNSPECIFIED TYPE: ICD-10-CM

## 2024-07-11 DIAGNOSIS — R10.10 UPPER ABDOMINAL PAIN: ICD-10-CM

## 2024-07-11 DIAGNOSIS — R07.2 PRECORDIAL PAIN: ICD-10-CM

## 2024-07-11 LAB
BH CV ECHO MEAS - DIST REN A EDV LEFT: 34.7 CM/S
BH CV ECHO MEAS - DIST REN A PSV LEFT: 135 CM/S
BH CV ECHO MEAS - MID REN A EDV LEFT: 49.2 CM/S
BH CV ECHO MEAS - MID REN A PSV LEFT: 121 CM/S
BH CV ECHO MEAS - PROX REN A EDV LEFT: -44 CM/S
BH CV ECHO MEAS - PROX REN A PSV LEFT: -148 CM/S
BH CV VAS BP LEFT ARM: NORMAL MMHG
BH CV VAS BP RIGHT ARM: NORMAL MMHG
BH CV VAS RENAL AORTIC MID PSV: 61.7 CM/S
BH CV VAS RENAL HILUM LEFT EDV: 17.8 CM/S
BH CV VAS RENAL HILUM LEFT PSV: 66.6 CM/S
BH CV VAS RENAL HILUM RIGHT EDV: 16.4 CM/S
BH CV VAS RENAL HILUM RIGHT PSV: 55.2 CM/S
BH CV XLRA MEAS - KID L LEFT: 11.4 CM
BH CV XLRA MEAS DIST REN A EDV RIGHT: 18.2 CM/S
BH CV XLRA MEAS DIST REN A PSV RIGHT: 115 CM/S
BH CV XLRA MEAS KID L RIGHT: 9.8 CM
BH CV XLRA MEAS KID W RIGHT: 4.3 CM
BH CV XLRA MEAS MID REN A EDV RIGHT: 24.1 CM/S
BH CV XLRA MEAS MID REN A PSV RIGHT: 111 CM/S
BH CV XLRA MEAS PROX REN A EDV RIGHT: -35.6 CM/S
BH CV XLRA MEAS PROX REN A PSV RIGHT: -130 CM/S
BH CV XLRA MEAS RAR LEFT: 2.4
BH CV XLRA MEAS RAR RIGHT: 2.11
BH CV XLRA MEAS RENAL A ORG EDV LEFT: -36.5 CM/S
BH CV XLRA MEAS RENAL A ORG EDV RIGHT: 21.5 CM/S
BH CV XLRA MEAS RENAL A ORG PSV LEFT: -114 CM/S
BH CV XLRA MEAS RENAL A ORG PSV RIGHT: 90.8 CM/S
LEFT KIDNEY WIDTH: 5.6 CM
LEFT RENAL UPPER PARENCHYMA MAX: 26.9 CM/S
LEFT RENAL UPPER PARENCHYMA MIN: 10.5 CM/S
LEFT RENAL UPPER PARENCHYMA RI: 0.61
RIGHT RENAL UPPER PARENCHYMA MAX: 25.2 CM/S
RIGHT RENAL UPPER PARENCHYMA MIN: 8.15 CM/S
RIGHT RENAL UPPER PARENCHYMA RI: 0.68

## 2024-07-11 PROCEDURE — 93975 VASCULAR STUDY: CPT

## 2024-07-11 PROCEDURE — 76705 ECHO EXAM OF ABDOMEN: CPT

## 2024-07-11 NOTE — TELEPHONE ENCOUNTER
Results and recommendations called to pt.  Instructed to call with any further questions or concerns.  Verbalized understanding.  6 month follow up scheduled per Dr. Ziegler's recommendations in office note.    Bonnie Escalante RN  Triage Nurse, [facility]  07/11/24 16:04 EDT

## 2024-07-11 NOTE — TELEPHONE ENCOUNTER
Please let him know that his renal artery Doppler was normal which is good to know given his hard to control high blood pressure.

## 2024-08-27 ENCOUNTER — OFFICE VISIT (OUTPATIENT)
Dept: FAMILY MEDICINE CLINIC | Facility: CLINIC | Age: 64
End: 2024-08-27
Payer: MEDICARE

## 2024-08-27 VITALS
HEIGHT: 69 IN | SYSTOLIC BLOOD PRESSURE: 118 MMHG | BODY MASS INDEX: 26.66 KG/M2 | DIASTOLIC BLOOD PRESSURE: 72 MMHG | RESPIRATION RATE: 17 BRPM | OXYGEN SATURATION: 94 % | TEMPERATURE: 97.1 F | HEART RATE: 58 BPM | WEIGHT: 180 LBS

## 2024-08-27 DIAGNOSIS — D75.89 MACROCYTOSIS: ICD-10-CM

## 2024-08-27 DIAGNOSIS — G47.33 OSA (OBSTRUCTIVE SLEEP APNEA): ICD-10-CM

## 2024-08-27 DIAGNOSIS — Z99.81 OXYGEN DEPENDENT: ICD-10-CM

## 2024-08-27 DIAGNOSIS — D72.829 LEUKOCYTOSIS, UNSPECIFIED TYPE: ICD-10-CM

## 2024-08-27 DIAGNOSIS — E04.9 GOITER: ICD-10-CM

## 2024-08-27 DIAGNOSIS — F41.0 PANIC ATTACK: ICD-10-CM

## 2024-08-27 DIAGNOSIS — N40.0 BENIGN PROSTATIC HYPERPLASIA WITHOUT LOWER URINARY TRACT SYMPTOMS: ICD-10-CM

## 2024-08-27 DIAGNOSIS — E55.9 VITAMIN D DEFICIENCY: ICD-10-CM

## 2024-08-27 DIAGNOSIS — J47.0 BRONCHIECTASIS WITH ACUTE LOWER RESPIRATORY INFECTION: ICD-10-CM

## 2024-08-27 DIAGNOSIS — J44.9 CHRONIC OBSTRUCTIVE PULMONARY DISEASE, UNSPECIFIED COPD TYPE: ICD-10-CM

## 2024-08-27 DIAGNOSIS — K90.49 GASTROINTESTINAL INTOLERANCE TO FOODS: ICD-10-CM

## 2024-08-27 DIAGNOSIS — D48.5 NEOPLASM OF UNCERTAIN BEHAVIOR OF SKIN: Primary | ICD-10-CM

## 2024-08-27 DIAGNOSIS — R10.13 EPIGASTRIC PAIN: ICD-10-CM

## 2024-08-27 DIAGNOSIS — J96.21 ACUTE ON CHRONIC RESPIRATORY FAILURE WITH HYPOXIA: ICD-10-CM

## 2024-08-27 DIAGNOSIS — R91.8 PULMONARY NODULES: ICD-10-CM

## 2024-08-27 DIAGNOSIS — E03.9 ACQUIRED HYPOTHYROIDISM: ICD-10-CM

## 2024-08-27 DIAGNOSIS — R73.9 HYPERGLYCEMIA: ICD-10-CM

## 2024-08-27 DIAGNOSIS — R09.02 HYPOXIA: ICD-10-CM

## 2024-08-27 DIAGNOSIS — E78.49 OTHER HYPERLIPIDEMIA: ICD-10-CM

## 2024-08-27 DIAGNOSIS — R06.02 SHORTNESS OF BREATH: ICD-10-CM

## 2024-08-27 DIAGNOSIS — F41.8 MIXED ANXIETY DEPRESSIVE DISORDER: ICD-10-CM

## 2024-08-27 DIAGNOSIS — K21.9 GASTROESOPHAGEAL REFLUX DISEASE, UNSPECIFIED WHETHER ESOPHAGITIS PRESENT: ICD-10-CM

## 2024-08-27 DIAGNOSIS — I10 PRIMARY HYPERTENSION: ICD-10-CM

## 2024-08-27 PROCEDURE — 3078F DIAST BP <80 MM HG: CPT | Performed by: PHYSICIAN ASSISTANT

## 2024-08-27 PROCEDURE — 3074F SYST BP LT 130 MM HG: CPT | Performed by: PHYSICIAN ASSISTANT

## 2024-08-27 PROCEDURE — 99214 OFFICE O/P EST MOD 30 MIN: CPT | Performed by: PHYSICIAN ASSISTANT

## 2024-08-27 PROCEDURE — 1160F RVW MEDS BY RX/DR IN RCRD: CPT | Performed by: PHYSICIAN ASSISTANT

## 2024-08-27 PROCEDURE — 1159F MED LIST DOCD IN RCRD: CPT | Performed by: PHYSICIAN ASSISTANT

## 2024-08-27 PROCEDURE — 1126F AMNT PAIN NOTED NONE PRSNT: CPT | Performed by: PHYSICIAN ASSISTANT

## 2024-09-12 ENCOUNTER — TRANSCRIBE ORDERS (OUTPATIENT)
Dept: ADMINISTRATIVE | Facility: HOSPITAL | Age: 64
End: 2024-09-12
Payer: MEDICARE

## 2024-09-12 ENCOUNTER — LAB (OUTPATIENT)
Facility: HOSPITAL | Age: 64
End: 2024-09-12
Payer: MEDICARE

## 2024-09-12 DIAGNOSIS — D75.89 BONE MARROW HYPERPLASIA: ICD-10-CM

## 2024-09-12 DIAGNOSIS — D72.829 LEUKOCYTOSIS, UNSPECIFIED TYPE: ICD-10-CM

## 2024-09-12 DIAGNOSIS — D72.829 LEUKOCYTOSIS, UNSPECIFIED TYPE: Primary | ICD-10-CM

## 2024-09-12 LAB
BASOPHILS # BLD AUTO: 0.08 10*3/MM3 (ref 0–0.2)
BASOPHILS NFR BLD AUTO: 0.6 % (ref 0–1.5)
DEPRECATED RDW RBC AUTO: 44.4 FL (ref 37–54)
EOSINOPHIL # BLD AUTO: 0.12 10*3/MM3 (ref 0–0.4)
EOSINOPHIL NFR BLD AUTO: 1 % (ref 0.3–6.2)
ERYTHROCYTE [DISTWIDTH] IN BLOOD BY AUTOMATED COUNT: 13.3 % (ref 12.3–15.4)
HCT VFR BLD AUTO: 46.8 % (ref 37.5–51)
HGB BLD-MCNC: 15.8 G/DL (ref 13–17.7)
IMM GRANULOCYTES # BLD AUTO: 0.15 10*3/MM3 (ref 0–0.05)
IMM GRANULOCYTES NFR BLD AUTO: 1.2 % (ref 0–0.5)
LYMPHOCYTES # BLD AUTO: 2.75 10*3/MM3 (ref 0.7–3.1)
LYMPHOCYTES NFR BLD AUTO: 22.1 % (ref 19.6–45.3)
MCH RBC QN AUTO: 31.3 PG (ref 26.6–33)
MCHC RBC AUTO-ENTMCNC: 33.8 G/DL (ref 31.5–35.7)
MCV RBC AUTO: 92.7 FL (ref 79–97)
MONOCYTES # BLD AUTO: 1.32 10*3/MM3 (ref 0.1–0.9)
MONOCYTES NFR BLD AUTO: 10.6 % (ref 5–12)
NEUTROPHILS NFR BLD AUTO: 64.5 % (ref 42.7–76)
NEUTROPHILS NFR BLD AUTO: 8 10*3/MM3 (ref 1.7–7)
NRBC BLD AUTO-RTO: 0 /100 WBC (ref 0–0.2)
PLATELET # BLD AUTO: 287 10*3/MM3 (ref 140–450)
PMV BLD AUTO: 9.8 FL (ref 6–12)
RBC # BLD AUTO: 5.05 10*6/MM3 (ref 4.14–5.8)
WBC NRBC COR # BLD AUTO: 12.42 10*3/MM3 (ref 3.4–10.8)

## 2024-09-12 PROCEDURE — 84481 FREE ASSAY (FT-3): CPT | Performed by: NURSE PRACTITIONER

## 2024-09-12 PROCEDURE — 85025 COMPLETE CBC W/AUTO DIFF WBC: CPT

## 2024-09-12 PROCEDURE — 84443 ASSAY THYROID STIM HORMONE: CPT | Performed by: NURSE PRACTITIONER

## 2024-09-12 PROCEDURE — 84439 ASSAY OF FREE THYROXINE: CPT | Performed by: NURSE PRACTITIONER

## 2024-09-16 ENCOUNTER — TELEPHONE (OUTPATIENT)
Dept: CARDIOLOGY | Facility: CLINIC | Age: 64
End: 2024-09-16
Payer: MEDICARE

## 2024-09-16 RX ORDER — AMLODIPINE BESYLATE 5 MG/1
5 TABLET ORAL 2 TIMES DAILY
Qty: 180 TABLET | Refills: 3 | Status: SHIPPED | OUTPATIENT
Start: 2024-09-16

## 2024-09-16 NOTE — TELEPHONE ENCOUNTER
"    Caller: Nguyễn Santana \"NEHA\"    Relationship: Self    Best call back number:[phone]    Requested Prescriptions: Synthroid 100 MCG tablet     levothyroxine (SYNTHROID, LEVOTHROID) 112 MCG tablet       Requested Prescriptions      No prescriptions requested or ordered in this encounter        Pharmacy where request should be sent:    [id number] - [place], [place] - [address] - [phone]  - [phone] FX  [address] [address]  Phone: [phone]  Fax: [phone]       Last office visit with prescribing clinician: 6/12/2023   Last telemedicine visit with prescribing clinician: Visit date not found   Next office visit with prescribing clinician: 6/12/2024     Additional details provided by patient:     Does the patient have less than a 3 day supply:  [x] Yes  [] No    Would you like a call back once the refill request has been completed: [] Yes [] No    If the office needs to give you a call back, can they leave a voicemail: [] Yes [] No    Samir Acosta   09/27/23 12:00 EDT       "
Sent    
20

## 2024-09-19 ENCOUNTER — OFFICE VISIT (OUTPATIENT)
Dept: GASTROENTEROLOGY | Facility: CLINIC | Age: 64
End: 2024-09-19
Payer: MEDICARE

## 2024-09-19 VITALS
OXYGEN SATURATION: 93 % | HEART RATE: 68 BPM | WEIGHT: 176.8 LBS | BODY MASS INDEX: 26.19 KG/M2 | DIASTOLIC BLOOD PRESSURE: 72 MMHG | TEMPERATURE: 98.6 F | SYSTOLIC BLOOD PRESSURE: 128 MMHG | HEIGHT: 69 IN

## 2024-09-19 DIAGNOSIS — K21.9 GASTROESOPHAGEAL REFLUX DISEASE, UNSPECIFIED WHETHER ESOPHAGITIS PRESENT: ICD-10-CM

## 2024-09-19 DIAGNOSIS — R13.19 ESOPHAGEAL DYSPHAGIA: Primary | ICD-10-CM

## 2024-09-19 DIAGNOSIS — Z86.010 HISTORY OF COLON POLYPS: ICD-10-CM

## 2024-09-19 PROCEDURE — 3074F SYST BP LT 130 MM HG: CPT | Performed by: NURSE PRACTITIONER

## 2024-09-19 PROCEDURE — 3078F DIAST BP <80 MM HG: CPT | Performed by: NURSE PRACTITIONER

## 2024-09-19 PROCEDURE — 1159F MED LIST DOCD IN RCRD: CPT | Performed by: NURSE PRACTITIONER

## 2024-09-19 PROCEDURE — 99213 OFFICE O/P EST LOW 20 MIN: CPT | Performed by: NURSE PRACTITIONER

## 2024-09-19 PROCEDURE — 1160F RVW MEDS BY RX/DR IN RCRD: CPT | Performed by: NURSE PRACTITIONER

## 2024-09-27 DIAGNOSIS — E03.9 ACQUIRED HYPOTHYROIDISM: ICD-10-CM

## 2024-09-27 RX ORDER — LEVOTHYROXINE SODIUM 100 MCG
TABLET ORAL
Qty: 60 TABLET | Refills: 1 | Status: SHIPPED | OUTPATIENT
Start: 2024-09-27

## 2024-09-30 ENCOUNTER — TELEPHONE (OUTPATIENT)
Dept: FAMILY MEDICINE CLINIC | Facility: CLINIC | Age: 64
End: 2024-09-30
Payer: MEDICARE

## 2024-09-30 NOTE — TELEPHONE ENCOUNTER
left message to call OK FOR HUB TO RELAY    WBC remains elevated but stable. Likely from continued prednisone. Please let me know if he is not on prednisone any longer.

## 2024-09-30 NOTE — TELEPHONE ENCOUNTER
"Name: Nguyễn Santana \"NEHA\"    Relationship: Self    HUB PROVIDED THE RELAY MESSAGE FROM THE OFFICE   PATIENT VOICED UNDERSTANDING AND HAS NO FURTHER QUESTIONS AT THIS TIME    ADDITIONAL INFORMATION: PATIENT STATES HE IS STILL ON PREDNISONE.      "

## 2024-10-25 DIAGNOSIS — E78.2 MIXED HYPERLIPIDEMIA: Primary | ICD-10-CM

## 2024-10-25 RX ORDER — LOVASTATIN 10 MG
10 TABLET ORAL
Qty: 90 TABLET | Refills: 1 | Status: SHIPPED | OUTPATIENT
Start: 2024-10-25

## 2024-10-25 NOTE — TELEPHONE ENCOUNTER
Rx Refill Note  Requested Prescriptions     Pending Prescriptions Disp Refills    lovastatin (MEVACOR) 10 MG tablet [Pharmacy Med Name: LOVASTATIN 10MG TABLETS] 90 tablet 1     Sig: TAKE 1 TABLET BY MOUTH EVERY NIGHT AT BEDTIME      Last office visit with prescribing clinician: 6/12/2024   Last telemedicine visit with prescribing clinician: Visit date not found   Next office visit with prescribing clinician: 6/12/2025                         Would you like a call back once the refill request has been completed: [] Yes [] No    If the office needs to give you a call back, can they leave a voicemail: [] Yes [] No    Roxy Rogers MA  10/25/24, 08:12 EDT

## 2025-01-20 NOTE — PROGRESS NOTES
RM:________     PCP: Vanesa Bernard PA    : 1960  AGE: 64 y.o.  EST PATIENT     REASON FOR VISIT/  CC:        BP Readings from Last 3 Encounters:   24 128/72   24 118/72   24 132/76      Wt Readings from Last 3 Encounters:   24 80.2 kg (176 lb 12.8 oz)   24 81.6 kg (180 lb)   24 81.6 kg (179 lb 12.8 oz)        WT: ____________ BP: __________L __________R HR______    CHEST PAIN: _____________    SOA: _____________PALPS: _______________     LIGHTHEADED: ___________FATIGUE: ________________ EDEMA __________    ALLERGIES:Hydroxyzine, Levothyroxine, and Trelegy ellipta [fluticasone-umeclidin-vilant] SMOKING HISTORY:  Social History     Tobacco Use    Smoking status: Former     Current packs/day: 0.00     Average packs/day: 1 pack/day for 41.3 years (41.3 ttl pk-yrs)     Types: Cigarettes     Start date: 1974     Quit date: 2015     Years since quittin.6    Smokeless tobacco: Never    Tobacco comments:     Began smoking at age 12.  Smoked 1.5 ppd for 6 years and 1 ppd for 36 years for a 45 pack year history.  She says that she quit 3 years ago even though she reports still smoking 1 or 2 cigarettes per week.   Vaping Use    Vaping status: Former   Substance Use Topics    Alcohol use: Yes     Alcohol/week: 2.0 standard drinks of alcohol     Types: 2 Cans of beer per week     Comment: Drinks 1 or 2 beers    Drug use: No     CAFFEINE USE_________________  ALCOHOL ______________________

## 2025-01-21 ENCOUNTER — OFFICE VISIT (OUTPATIENT)
Age: 65
End: 2025-01-21
Payer: MEDICARE

## 2025-01-21 VITALS
SYSTOLIC BLOOD PRESSURE: 140 MMHG | HEART RATE: 45 BPM | DIASTOLIC BLOOD PRESSURE: 80 MMHG | BODY MASS INDEX: 26.36 KG/M2 | WEIGHT: 178 LBS | HEIGHT: 69 IN

## 2025-01-21 DIAGNOSIS — G47.33 OSA (OBSTRUCTIVE SLEEP APNEA): ICD-10-CM

## 2025-01-21 DIAGNOSIS — E78.49 OTHER HYPERLIPIDEMIA: Primary | ICD-10-CM

## 2025-01-21 DIAGNOSIS — I10 PRIMARY HYPERTENSION: ICD-10-CM

## 2025-01-21 PROCEDURE — 1159F MED LIST DOCD IN RCRD: CPT | Performed by: INTERNAL MEDICINE

## 2025-01-21 PROCEDURE — 1160F RVW MEDS BY RX/DR IN RCRD: CPT | Performed by: INTERNAL MEDICINE

## 2025-01-21 PROCEDURE — 93000 ELECTROCARDIOGRAM COMPLETE: CPT | Performed by: INTERNAL MEDICINE

## 2025-01-21 PROCEDURE — 3077F SYST BP >= 140 MM HG: CPT | Performed by: INTERNAL MEDICINE

## 2025-01-21 PROCEDURE — 3079F DIAST BP 80-89 MM HG: CPT | Performed by: INTERNAL MEDICINE

## 2025-01-21 PROCEDURE — 99214 OFFICE O/P EST MOD 30 MIN: CPT | Performed by: INTERNAL MEDICINE

## 2025-01-21 RX ORDER — BISOPROLOL FUMARATE 5 MG/1
2.5 TABLET, FILM COATED ORAL DAILY
Start: 2025-01-21

## 2025-01-21 NOTE — PROGRESS NOTES
"      CARDIOLOGY    Luciana Ziegler MD    ENCOUNTER DATE:  01/21/2025    Nguyễn Santana / 64 y.o. / male        CHIEF COMPLAINT / REASON FOR OFFICE VISIT     Hypertension      HISTORY OF PRESENT ILLNESS       HPI    Nguyễn Santana is a 64 y.o. male     I saw this patient back in 2015.  At that time he had a stress echo which showed normal LV function, no significant valve disease and no evidence of ischemia.     I saw him back in September 2023 due to elevated blood pressure.  He was complaining of numbness down his right arm.     He had an echocardiogram in 09/2023, which showed normal LV function, ejection fraction 61%, trace tricuspid regurgitation with a normal right ventricular systolic pressure. He had a nuclear stress test in 09/2023, which showed normal LV function and no evidence of ischemia.       His blood pressure was elevated when I saw him in March 2024 and I increased the lisinopril to 10 mg twice daily.  He felt like he was having more chest pain with this and went to the emergency room at [facility] in April 2024.  He ruled out for myocardial infarction.  He was told to discontinue lisinopril.  He followed up with one of her nurse practitioners in the office and was started on amlodipine 5 mg a day.    Currently taking amlodipine 5 mg twice daily and bisoprolol 5 mg daily.  He says after he takes his morning meds which includes bisoprolol an hour later he feels very fatigued and tired.  He did bring in an excellent list of blood pressure and heart rate numbers which I reviewed which does show some bradycardia generally not as much as were seen in the office today.       VITAL SIGNS     Visit Vitals  /80 (BP Location: Left arm)   Pulse (!) 45   Ht 175.3 cm (69\")   Wt 80.7 kg (178 lb)   BMI 26.29 kg/m²         Wt Readings from Last 3 Encounters:   01/21/25 80.7 kg (178 lb)   09/19/24 80.2 kg (176 lb 12.8 oz)   08/27/24 81.6 kg (180 lb)     Body mass index is 26.29 " kg/m².      PHYSICAL EXAMINATION     Constitutional:       General: Not in acute distress.  Neck:      Vascular: No carotid bruit or JVD.   Pulmonary:      Effort: Pulmonary effort is normal.      Breath sounds: Wheezing present.   Cardiovascular:      Normal rate. Regular rhythm.      Murmurs: There is no murmur.   Psychiatric:         Mood and Affect: Mood and affect normal.           REVIEWED DATA       ECG 12 Lead    Date/Time: 1/21/2025 11:30 AM  Performed by: Luciana Ziegler MD    Authorized by: Luciana Ziegler MD  Comparison: compared with previous ECG from 7/9/2025  Comparison to previous ECG: Heart rate is slower  Rhythm: sinus bradycardia  BPM: 45  Conduction: conduction normal  ST Segments: ST segments normal  T Waves: T waves normal  QRS axis: left    Clinical impression: abnormal EKG            Lab Results   Component Value Date    GLUCOSE 77 05/29/2024    BUN 16 05/29/2024    CREATININE 1.10 05/29/2024     05/29/2024    K 4.7 05/29/2024     05/29/2024    CALCIUM 9.5 05/29/2024    ALBUMIN 4.6 03/11/2024    ALT 32 03/11/2024    AST 19 03/11/2024    ALKPHOS 67 03/11/2024    BILITOT 0.9 03/11/2024    BCR 15 05/29/2024       ASSESSMENT & PLAN      Diagnosis Plan   1. Other hyperlipidemia        2. Primary hypertension        3. MCKENZIE (obstructive sleep apnea)            Hypertension.  Renal artery Doppler July 2024 was normal.  His blood pressure looks good but he is having fatigue which I think is secondary to bradycardia.  He is vita continue amlodipine 5 mg twice daily.  He is going to cut his bisoprolol 5 mg in half and try half a tablet in the morning.  He is going to continue to write down his blood pressure and heart rate numbers and send them to me through [employer] in 2 weeks.  I may try to discontinue the bisoprolol completely.  He did not feel well on lisinopril.  Hyperlipidemia. He is on lovastatin. His lipids are followed by his primary provider.   Obstructive sleep apnea.   COPD.  Recent pneumonia. Not smoking.  He was tried on steroids but said it made his muscles and joints hurt.  Elevated troponin in the setting of pneumonia.He had a normal nuclear stress test in September 2023.  Echocardiogram September 2023 showed normal LV function, normal right ventricular systolic pressure.  He had a trip to the emergency room with chest pain and hypertension in April 2024.  Troponins were unremarkable.  He is not currently having chest pain or exertional symptoms.     He is going to send me a message through [employer] with blood pressure and heart rate numbers.  Discontinue bisoprolol if possible.  Follow-up in 6 months.      Orders Placed This Encounter   Procedures    ECG 12 Lead     This order was created via procedure documentation     Order Specific Question:   Release to patient     Answer:   Routine Release [[id number]]           MEDICATIONS         Discharge Medications            Accurate as of January 21, 2025 11:32 AM. If you have any questions, ask your nurse or doctor.                Changes to Medications        Instructions Start Date   albuterol (2.5 MG/3ML) 0.083% nebulizer solution  Commonly known as: PROVENTIL  What changed: additional instructions   2.5 mg, Nebulization, 4 Times Daily - RT, Patient will call when needed      albuterol 2 MG tablet  Commonly known as: PROVENTIL  What changed: Another medication with the same name was changed. Make sure you understand how and when to take each.   2 mg, Oral, 4 Times Daily      albuterol sulfate  (90 Base) MCG/ACT inhaler  Commonly known as: PROVENTIL HFA;VENTOLIN HFA;PROAIR HFA  What changed: Another medication with the same name was changed. Make sure you understand how and when to take each.   2 puffs, Inhalation, Every 4 Hours PRN      bisoprolol 5 MG tablet  Commonly known as: ZEBeta  What changed: how much to take  Changed by: Luciana Ziegler   2.5 mg, Oral, Daily             Continue These Medications        Instructions  Start Date   amLODIPine 5 MG tablet  Commonly known as: NORVASC   5 mg, Oral, 2 Times Daily      aspirin 81 MG chewable tablet   81 mg, Daily      betamethasone valerate 0.1 % ointment  Commonly known as: VALISONE   1 application , Topical, 2 Times Daily      Breztri Aerosphere 160-9-4.8 MCG/ACT aerosol inhaler  Generic drug: Budeson-Glycopyrrol-Formoterol   2 puffs, Every 12 Hours      lovastatin 10 MG tablet  Commonly known as: MEVACOR   10 mg, Oral, Every Night at Bedtime      montelukast 10 MG tablet  Commonly known as: SINGULAIR   10 mg, Oral, Daily, Patient will call when needed      Nebulizer device   No dose, route, or frequency recorded.      O2  Commonly known as: OXYGEN   4 L/min, Once      pantoprazole 40 MG EC tablet  Commonly known as: Protonix   40 mg, Oral, Daily      predniSONE 10 MG tablet  Commonly known as: DELTASONE   10 mg, Daily      Synthroid 100 MCG tablet  Generic drug: levothyroxine   TAKE 1 TABLET BY MOUTH 5 DAYS A WEEK      Tezspire 210 MG/1.91ML solution auto-injector  Generic drug: Tezepelumab-ekko   Every 30 Days      TUSSIN CF COUGH & COLD PO   Every Morning      vitamin D 1.25 MG (83802 UT) capsule capsule  Commonly known as: ERGOCALCIFEROL   1 capsule, Weekly                 Luciana Ziegler MD  01/21/25  11:32 EST    Part of this note may be an electronic transcription/translation of spoken language to printed text using the Dragon dictation system.

## 2025-02-15 DIAGNOSIS — E03.9 ACQUIRED HYPOTHYROIDISM: ICD-10-CM

## 2025-02-17 RX ORDER — LEVOTHYROXINE SODIUM 100 MCG
TABLET ORAL
Qty: 60 TABLET | Refills: 0 | Status: SHIPPED | OUTPATIENT
Start: 2025-02-17

## 2025-02-17 NOTE — TELEPHONE ENCOUNTER
Rx Refill Note  Requested Prescriptions     Pending Prescriptions Disp Refills    Synthroid 100 MCG tablet [Pharmacy Med Name: SYNTHROID 0.1MG (100MCG)TABLETS] 60 tablet 1     Sig: TAKE 1 TABLET BY MOUTH 5 DAYS A WEEK      Last office visit with prescribing clinician: 6/12/2024   Last telemedicine visit with prescribing clinician: Visit date not found   Next office visit with prescribing clinician: 6/12/2025                         Would you like a call back once the refill request has been completed: [] Yes [] No    If the office needs to give you a call back, can they leave a voicemail: [] Yes [] No    Antonia Todd MA  02/17/25, 07:56 EST

## 2025-03-11 ENCOUNTER — OFFICE VISIT (OUTPATIENT)
Dept: FAMILY MEDICINE CLINIC | Facility: CLINIC | Age: 65
End: 2025-03-11
Payer: MEDICARE

## 2025-03-11 VITALS
WEIGHT: 181 LBS | BODY MASS INDEX: 26.81 KG/M2 | SYSTOLIC BLOOD PRESSURE: 112 MMHG | HEIGHT: 69 IN | HEART RATE: 61 BPM | TEMPERATURE: 97.6 F | OXYGEN SATURATION: 94 % | RESPIRATION RATE: 16 BRPM | DIASTOLIC BLOOD PRESSURE: 68 MMHG

## 2025-03-11 DIAGNOSIS — D72.829 LEUKOCYTOSIS, UNSPECIFIED TYPE: ICD-10-CM

## 2025-03-11 DIAGNOSIS — K90.49 GASTROINTESTINAL INTOLERANCE TO FOODS: ICD-10-CM

## 2025-03-11 DIAGNOSIS — N40.0 BENIGN PROSTATIC HYPERPLASIA WITHOUT LOWER URINARY TRACT SYMPTOMS: ICD-10-CM

## 2025-03-11 DIAGNOSIS — Z00.00 MEDICARE ANNUAL WELLNESS VISIT, SUBSEQUENT: Primary | ICD-10-CM

## 2025-03-11 DIAGNOSIS — R91.8 PULMONARY NODULES: ICD-10-CM

## 2025-03-11 DIAGNOSIS — F41.0 PANIC ATTACK: ICD-10-CM

## 2025-03-11 DIAGNOSIS — R73.9 HYPERGLYCEMIA: ICD-10-CM

## 2025-03-11 DIAGNOSIS — R09.02 HYPOXIA: ICD-10-CM

## 2025-03-11 DIAGNOSIS — E55.9 VITAMIN D DEFICIENCY: ICD-10-CM

## 2025-03-11 DIAGNOSIS — K21.9 GASTROESOPHAGEAL REFLUX DISEASE, UNSPECIFIED WHETHER ESOPHAGITIS PRESENT: ICD-10-CM

## 2025-03-11 DIAGNOSIS — J44.9 CHRONIC OBSTRUCTIVE PULMONARY DISEASE, UNSPECIFIED COPD TYPE: ICD-10-CM

## 2025-03-11 DIAGNOSIS — R09.89 LABILE BLOOD PRESSURE: ICD-10-CM

## 2025-03-11 DIAGNOSIS — Z86.19 HISTORY OF HELICOBACTER PYLORI INFECTION: ICD-10-CM

## 2025-03-11 DIAGNOSIS — E78.49 OTHER HYPERLIPIDEMIA: ICD-10-CM

## 2025-03-11 DIAGNOSIS — R06.02 SHORTNESS OF BREATH: ICD-10-CM

## 2025-03-11 DIAGNOSIS — J96.21 ACUTE ON CHRONIC RESPIRATORY FAILURE WITH HYPOXIA: ICD-10-CM

## 2025-03-11 DIAGNOSIS — J47.0 BRONCHIECTASIS WITH ACUTE LOWER RESPIRATORY INFECTION: ICD-10-CM

## 2025-03-11 DIAGNOSIS — R79.9 ABNORMAL FINDING OF BLOOD CHEMISTRY, UNSPECIFIED: ICD-10-CM

## 2025-03-11 DIAGNOSIS — R07.9 CHEST PAIN, UNSPECIFIED TYPE: ICD-10-CM

## 2025-03-11 DIAGNOSIS — Z12.2 ENCOUNTER FOR SCREENING FOR LUNG CANCER: ICD-10-CM

## 2025-03-11 DIAGNOSIS — Z79.52 CURRENT CHRONIC USE OF SYSTEMIC STEROIDS: ICD-10-CM

## 2025-03-11 DIAGNOSIS — D12.6 TUBULOVILLOUS ADENOMA OF COLON: ICD-10-CM

## 2025-03-11 DIAGNOSIS — D75.89 MACROCYTOSIS: ICD-10-CM

## 2025-03-11 DIAGNOSIS — Z12.5 PROSTATE CANCER SCREENING: ICD-10-CM

## 2025-03-11 DIAGNOSIS — R10.13 EPIGASTRIC PAIN: ICD-10-CM

## 2025-03-11 DIAGNOSIS — I10 PRIMARY HYPERTENSION: ICD-10-CM

## 2025-03-11 DIAGNOSIS — Z99.81 OXYGEN DEPENDENT: ICD-10-CM

## 2025-03-11 DIAGNOSIS — F41.8 MIXED ANXIETY DEPRESSIVE DISORDER: ICD-10-CM

## 2025-03-11 DIAGNOSIS — E03.9 ACQUIRED HYPOTHYROIDISM: ICD-10-CM

## 2025-03-11 DIAGNOSIS — G47.33 OSA (OBSTRUCTIVE SLEEP APNEA): ICD-10-CM

## 2025-03-11 RX ORDER — DUPILUMAB 300 MG/2ML
INJECTION, SOLUTION SUBCUTANEOUS ONCE
COMMUNITY

## 2025-03-11 NOTE — PROGRESS NOTES
Subjective   The ABCs of the Annual Wellness Visit  Medicare Wellness Visit      Nguyễn Santana is a 64 y.o. patient who presents for a Medicare Wellness Visit.    Last colonoscopy- 12/2022- Dr Jimenez- polyp x 1- tubulovillous adenoma. Recheck 3 years.   EGD with H pylori- treated. Last appt 9/2024- follow up 1 year.   5/2012- polyp removal. Recheck in 3 years.   Last DEXA- never  Last TDAP- unknown  Last Prevnar- unknown  Last Pneumovax- unknown  Last flu shot-10/2018  Shingrix- had varicella as a child- has not had vaccination.   Covid 19- Pfizer- 8/2021, 9/2021  RSV-     The following portions of the patient's history were reviewed and   updated as appropriate: allergies, current medications, past family history, past medical history, past social history, past surgical history, and problem list.    Compared to one year ago, the patient's physical   health is the same.  Compared to one year ago, the patient's mental   health is the same.    Recent Hospitalizations:  He was not admitted to the hospital during the last year.     Current Medical Providers:  Patient Care Team:  Vanesa Bernard PA as PCP - General (Family Medicine)  Nellie Hsu APRN as Nurse Practitioner (Oncology)  Lion West MD as Consulting Physician (Endocrinology)  Rigoberto Colon MD as Consulting Physician (Pulmonary Disease)  Nany Menon APRN as Nurse Practitioner (Gastroenterology)  Mame Guido APRN as Nurse Practitioner (Nurse Practitioner)    Outpatient Medications Prior to Visit   Medication Sig Dispense Refill    albuterol (PROVENTIL) (2.5 MG/3ML) 0.083% nebulizer solution Take 2.5 mg by nebulization 4 (Four) Times a Day. Patient will call when needed (Patient taking differently: Take 2.5 mg by nebulization 4 (Four) Times a Day. Patient taking 2 nebulization 4 times a day.    Patient will call when needed) 125 vial 6    albuterol (PROVENTIL) 2 MG tablet Take 1 tablet by mouth 4 (Four) Times a Day. 120  tablet 6    albuterol sulfate  (90 Base) MCG/ACT inhaler Inhale 2 puffs Every 4 (Four) Hours As Needed for Wheezing. 1 inhaler 5    amLODIPine (NORVASC) 5 MG tablet Take 1 tablet by mouth 2 (Two) Times a Day. 180 tablet 3    aspirin 81 MG chewable tablet Chew 1 tablet Daily.      betamethasone valerate (VALISONE) 0.1 % ointment Apply 1 application  topically to the appropriate area as directed 2 (Two) Times a Day. 45 g 0    bisoprolol (ZEBeta) 5 MG tablet Take 0.5 tablets by mouth Daily.      Breztri Aerosphere 160-9-4.8 MCG/ACT aerosol inhaler Inhale 2 puffs Every 12 (Twelve) Hours.      Dupilumab (Dupixent) 300 MG/2ML solution auto-injector injection Inject  under the skin into the appropriate area as directed 1 (One) Time.      lovastatin (MEVACOR) 10 MG tablet Take 1 tablet by mouth every night at bedtime. Indications: High Amount of Fats in the Blood 90 tablet 1    montelukast (SINGULAIR) 10 MG tablet Take 1 tablet by mouth Daily. Patient will call when needed 30 tablet 3    O2 (OXYGEN) Inhale 4 L/min 1 (One) Time. 4 LPM HS only      pantoprazole (Protonix) 40 MG EC tablet Take 1 tablet by mouth Daily. 90 tablet 3    Phenylephrine-DM-GG (TUSSIN CF COUGH & COLD PO) Take  by mouth Every Morning. As needed      predniSONE (DELTASONE) 10 MG tablet Take 1 tablet by mouth Daily.      Respiratory Therapy Supplies (NEBULIZER) device       Synthroid 100 MCG tablet TAKE 1 TABLET BY MOUTH 5 DAYS A WEEK 60 tablet 0    vitamin D (ERGOCALCIFEROL) 1.25 MG (09412 UT) capsule capsule Take 1 capsule by mouth 1 (One) Time Per Week.      Tezepelumab-ekko (Tezspire) 210 MG/1.91ML solution auto-injector Inject  under the skin into the appropriate area as directed Every 30 (Thirty) Days.       No facility-administered medications prior to visit.     No opioid medication identified on active medication list. I have reviewed chart for other potential  high risk medication/s and harmful drug interactions in the  "elderly.      Aspirin is on active medication list. Aspirin use is indicated based on review of current medical condition/s. Pros and cons of this therapy have been discussed today. Benefits of this medication outweigh potential harm.  Patient has been encouraged to continue taking this medication.  .      Patient Active Problem List   Diagnosis    Atopic rhinitis    Mixed anxiety depressive disorder    COPD (chronic obstructive pulmonary disease)    Dizziness    Hypothyroidism    Hypoxia    Shortness of breath    MCKENZIE (obstructive sleep apnea)    Vitamin D deficiency    Chronic fatigue    Oxygen dependent    Panic attack    Muscle spasms of neck    Hyperglycemia    Benign prostatic hyperplasia without lower urinary tract symptoms    Gastroesophageal reflux disease    Epigastric pain    Encounter for screening colonoscopy    Family history of colon cancer in mother    Hx of pneumococcal pneumonia    Goiter    Primary hypertension    Other hyperlipidemia    Bronchiectasis    Pulmonary nodules     Advance Care Planning Advance Directive is not on file.  ACP discussion was held with the patient during this visit. Patient does not have an advance directive, information provided.            Objective   Vitals:    03/11/25 1254   BP: 112/68   Pulse: 61   Resp: 16   Temp: 97.6 °F (36.4 °C)   TempSrc: Temporal   SpO2: 94%   Weight: 82.1 kg (181 lb)   Height: 175.3 cm (69.02\")       Estimated body mass index is 26.72 kg/m² as calculated from the following:    Height as of this encounter: 175.3 cm (69.02\").    Weight as of this encounter: 82.1 kg (181 lb).                Does the patient have evidence of cognitive impairment? No  Lab Results   Component Value Date    CHLPL 193 03/13/2025    TRIG 123 03/13/2025    HDL 62 03/13/2025     (H) 03/13/2025    VLDL 22 03/13/2025    HGBA1C 5.6 03/13/2025                                                                                               Health  Risk " Assessment    Smoking Status:  Social History     Tobacco Use   Smoking Status Former    Current packs/day: 0.00    Average packs/day: 1 pack/day for 41.3 years (41.3 ttl pk-yrs)    Types: Cigarettes    Start date: 1974    Quit date: 2015    Years since quittin.8   Smokeless Tobacco Never   Tobacco Comments    Began smoking at age 12.  Smoked 1.5 ppd for 6 years and 1 ppd for 36 years for a 45 pack year history.  She says that she quit 3 years ago even though she reports still smoking 1 or 2 cigarettes per week.     Alcohol Consumption:  Social History     Substance and Sexual Activity   Alcohol Use Yes    Alcohol/week: 2.0 standard drinks of alcohol    Types: 2 Cans of beer per week    Comment: Drinks 1 or 2 beers       Fall Risk Screen  STEADI Fall Risk Assessment was completed, and patient is at LOW risk for falls.Assessment completed on:3/11/2025    Depression Screening   Little interest or pleasure in doing things? Not at all   Feeling down, depressed, or hopeless? Not at all   PHQ-2 Total Score 0      Health Habits and Functional and Cognitive Screening:      3/11/2025    12:54 PM   Functional & Cognitive Status   Do you have difficulty preparing food and eating? No   Do you have difficulty bathing yourself, getting dressed or grooming yourself? No   Do you have difficulty using the toilet? No   Do you have difficulty moving around from place to place? No   Do you have trouble with steps or getting out of a bed or a chair? No   Current Diet Well Balanced Diet   Dental Exam Up to date   Eye Exam Not up to date   Exercise (times per week) Other   Current Exercises Include House Cleaning   Do you need help using the phone?  No   Are you deaf or do you have serious difficulty hearing?  Yes   Do you need help to go to places out of walking distance? Yes   Do you need help shopping? No   Do you need help preparing meals?  No   Do you need help with housework?  No   Do you need help with laundry? No   Do  you need help taking your medications? No   Do you need help managing money? No   Do you ever drive or ride in a car without wearing a seat belt? No   Have you felt unusual stress, anger or loneliness in the last month? No   Who do you live with? Child   If you need help, do you have trouble finding someone available to you? No   Have you been bothered in the last four weeks by sexual problems? No   Do you have difficulty concentrating, remembering or making decisions? No           Age-appropriate Screening Schedule:  Refer to the list below for future screening recommendations based on patient's age, sex and/or medical conditions. Orders for these recommended tests are listed in the plan section. The patient has been provided with a written plan.    Health Maintenance List  Health Maintenance   Topic Date Due    ZOSTER VACCINE (1 of 2) Never done    ANNUAL WELLNESS VISIT  10/25/2020    COVID-19 Vaccine (3 - 2024-25 season) 09/01/2024    LUNG CANCER SCREENING  10/13/2024    INFLUENZA VACCINE  03/31/2025 (Originally 7/1/2024)    Pneumococcal Vaccine 50+ (1 of 2 - PCV) 03/11/2026 (Originally 9/29/1981)    TDAP/TD VACCINES (1 - Tdap) 03/11/2026 (Originally 9/29/1981)    COLORECTAL CANCER SCREENING  12/14/2025    LIPID PANEL  03/13/2026    HEPATITIS C SCREENING  Completed                                                                                                                                                CMS Preventative Services Quick Reference  Risk Factors Identified During Encounter  Immunizations Discussed/Encouraged: Tdap, Influenza, Prevnar 20 (Pneumococcal 20-valent conjugate), Shingrix, COVID19, and RSV (Respiratory Syncytial Virus)    The above risks/problems have been discussed with the patient.  Pertinent information has been shared with the patient in the After Visit Summary.  An After Visit Summary and PPPS were made available to the patient.    Follow Up:   Next Medicare Wellness visit to be  scheduled in 1 year.     Assessment & Plan  Encounter for screening for lung cancer         Medicare annual wellness visit, subsequent         Current chronic use of systemic steroids    Orders:    DEXA Bone Density Axial    Primary hypertension      Orders:    Comprehensive Metabolic Panel    Urinalysis With Culture If Indicated -    Labile blood pressure    Orders:    Comprehensive Metabolic Panel    Urinalysis With Culture If Indicated -    Other hyperlipidemia       Orders:    Comprehensive Metabolic Panel    CK    Lipid Panel With LDL / HDL Ratio    Hyperglycemia    Orders:    Comprehensive Metabolic Panel    Hemoglobin A1c    Uric Acid    Urinalysis With Culture If Indicated -    Vitamin D deficiency    Orders:    Comprehensive Metabolic Panel    Vitamin D,25-Hydroxy    Acquired hypothyroidism         Leukocytosis, unspecified type    Orders:    CBC & Differential    Macrocytosis    Orders:    CBC & Differential    Vitamin B12 & Folate    Iron and TIBC    Ferritin    Mixed anxiety depressive disorder           Panic attack         Epigastric pain         Gastroesophageal reflux disease, unspecified whether esophagitis present         History of Helicobacter pylori infection         Tubulovillous adenoma of colon         Gastrointestinal intolerance to foods         Chronic obstructive pulmonary disease, unspecified COPD type           Bronchiectasis with acute lower respiratory infection         Hypoxia         Acute on chronic respiratory failure with hypoxia         Oxygen dependent         Pulmonary nodules         Shortness of breath         Chest pain, unspecified type         MCKENZIE (obstructive sleep apnea)         Benign prostatic hyperplasia without lower urinary tract symptoms         Prostate cancer screening    Orders:    PSA Screen    Abnormal finding of blood chemistry, unspecified    Orders:    Iron and TIBC    Ferritin         Follow Up:   No follow-ups on file.

## 2025-03-11 NOTE — PROGRESS NOTES
Subjective   Nguyễn Santana is a 64 y.o. male who presents today for evaluation of skin lesion and due for follow-up of hypertension, leukocytosis, easy bruising, chest pain, GERD, LUQ pain, GI symptoms, anxiety, and specialists for hyperlipidemia, hypothyroidism, vitamin D deficiency, COPD, pulmonary nodules, and history of pneumonia.      HPI    Hypertension- blood pressure up and down- taking Bisoprolol 5 mg daily, Norvasc 5 mg daily. When BP > 130, feels bad. If 150 systolic, he takes another tablet, BP improves and feels better    taking Lisinopril 5 mg once daily. BP is still labile- changes with position changes.  He had not checked until last week when he felt bad. /89. He knew he needed to get checked out.   His BP goes up and down some. BP goes up when he has increased anxiety- pulmonologist advised he will have this with taking albuterol so often.  Previously stopped Spiriva and Advair and started Trelegy. Helps but does not last as long.  He did not have to use emergency inhaler so often.   Hyperlipidemia- on Lovastatin 10 mg daily. Not being checked by endocrinology.   Leukocytosis- told that from daily prednisone.   Still bruising a lot- he is still on daily prednisone from pulmonology. He reports barely hitting himself results in increased bruising. No bruising on legs, trunk, back. He has only on bilateral hands and arms. No blood in urine or stool. No nosebleeds or other bleeding.    He is following with endocrinology-SHANT Watts with last appointment 2023  Hypothyroidism-changed thyroid 100 mcg 5 days per week and 112 mcg 2 days per week.   Cholesterol- still on Lovastatin 10 mg. They have not mentioned cholesterol.   Vitamin D- taking vitamin D 50,000 IU once weekly. Prescribed by endocrinology.    Anxiety, moods- doing ok. No need for specialist. Has lost 6 family members. Moods are stable from last year.    Still caring for grandparents  He had increased stress- wife  in  April. She was not feeling well- took her to hospital. Heart attack while in the hospital and tried CPR. Called easter morning and told him she passed away.   She was paralyzed from waist down from CVA 3 years ago. She was doing ok then did not want to eat any longer and would get weak because she would not eat. They put tube in but scraped her stomach and caused bleeding. Had to cauterize that. She then had a hard time breathing and could not put on ventilator. Put on BIPAP.   He has custody of 5 grandkids.   Sometimes less anxiety. A few years ago, Breanna put him on something for anxiety- he felt medicine head and did not like that. He was given Celexa previously. Also given Xanax very briefly. When something goes wrong, he gets upset and has to try to calm anxiety down.   Caring for 5 grandchildren and his wife had CVA with significant deficit.     Chest pressure, diaphoresis- no further CP but has sweating.   Patient went to the emergency department 7/8/2023 for chest pressure with associated diaphoresis.  He had pain from his right shoulder that went across his chest into his left shoulder.  He denied shortness of breath, change in cough, fever, swelling or other pain.  Patient had elevated blood pressure.  He was given ibuprofen and baclofen.  Labs-WBC 13.3 with elevated neutrophils, negative high-sensitivity troponin, negative BNP.  Trace protein in urine.  Negative D-dimer.  Chest x-ray negative.  He reports his BP was up and down all day. He woke up with pain in right arm, across chest and all the way down left arm. He had headache. Went to sleep and woke up and felt poorly.   /89. He knew he needed to get checked out.   They gave 4 baby aspirin and felt better but not 100%. BP improved and they released to follow up here and cardiology.   No neck pain.   After leaving, they put on ibuprofen and Baclofen. When stood up and laid down, pain resolved in right arm/ shoulder but sitting had pain in right  shoulder and arm. He has had improvement. Feeling about 60% improvement.     GERD- taking Protonix 40 mg daily and underwent abd US with GI. EGD with H pylori- treated by GI.   Consideration of EGD and gastric emptying study at follow up 9/19/2024 with symptoms.   Previously he reported worsening symptoms-when he tries to eat, he has significant pain. They gave him medication in the hospital and it worked really.   Left upper quadrant pain-He underwent EGD with gastritis-positive H. pylori and internal hemorrhoids and colonoscopy with 1 tubulovillous adenoma polyp.  Treated with Protonix, bismuth, tetracycline, and Flagyl.  Advised follow-up with APRN 4 weeks from 12/2022 and repeat H. pylori testing in 8 weeks.  Repeat colonoscopy in 3 years.  They ordered gastric emptying study-had to reschedule while his wife is on the hospital. Antibiotics helped but depends on what he eats- still has pain.   Patient reported pain in LUQ under his ribs since 2014 when Dx with COPD. Got up and was having no problems. He got up and started moving around and had tingling in both arms, both legs, pressure behind both eyes, and pain under ribs. /137. Went to ER.   Patient was seen in the ER 6/29/2021 for left chest pain.  Negative troponin.  Elevated WBC and macrocytosis- patient was told from chronic steroid.  Chest x-ray without acute changes.  Patient was started on lisinopril for elevated blood pressure. He reports he took medication and slept all day x 2 days. He then started 1/2 twice daily- tolerating better but feels medicine head and dizzy with standing up. They warned him about this but he does not tolerate a lot of medications.   When eating, getting bloated. Stomach hurts then improves. Does not matter what he eats, he has symptoms. GB remains.  Had EGD 2014 when he had symptoms in the past.   Negative gallbladder ultrasound and HIDA scan 9/2022.  CT abdomen pelvis 9/2022- 15 mm simple cyst left kidney.  Otherwise  negative.  8/30/2022-patient developed left upper abdominal pain last week-reporting sharp pain.  He also had blood pressure elevated 170/90, tingling in bilateral legs and extreme fatigue.  He took his blood pressure medication, laid down, and woke up feeling normal.  He had this happen 5 years ago with negative cardiac work-up.  CT with fatty area on site where he felt stabbing pain.  Tubulovillous adenoma- 12/2022- recheck colonsocopy 3 years.     Flexeril given for pain and only taken twice.  Pain in left tricep- depends on position. If he stands up, it resolves. Tylenol resolves as well. Does not feel he needs imaging or PT.   Numbness/tingling arms and legs- sometimes- still comes ago- still positional. Not worsening.   Every once in a while, numbness and tingling in arms and legs from knee down. Then moves and goes away.          He is following with pulmonology- last appt last month with follow up this month. Scheduled for procedure. When they called to schedule, he had questions and they were going to get back with provider and let him know.   COPD-pulmonology- wants to consider transplant. Patient does not want to take. Put on Spiriva. Still on Prednisone, albuterol, Mucinex intermittent, Beztri, Singulair 10 mg   Influenza, pneumonia-  Patient was hospitalized 3/1/2024 through 3/4/2024 for acute on chronic hypoxic respiratory failure with influenza A, community-acquired pneumonia, hyponatremia, elevated troponin, hypothyroidism, hyperlipidemia, hypertension, GERD. Per discharge summary, he presented to the emergency department with 2 to 3 days of progressive dyspnea and had to wear his oxygen consistently throughout the day rather than just at night.  He also had intermittent low-grade fevers and a dry nonproductive cough.  He required 4 L oxygen nasal cannula with positive respiratory panel for influenza A.  Chest x-ray with possible pneumonia and procalcitonin was mildly elevated at 0.28, so he was  started on CAP treatment with azithromycin and ceftriaxone.  He was also started on steroids for possible COPD exacerbation.  He was given DuoNebs and oseltamivir.  He had reaction to azithromycin after his second dose Tory felt hot and flushed, so medication was discontinued.  He was also found to be hyponatremic at 132 resolved by the time of discharge.  He was weaned to room air and passed a walk test without significant desaturation was stable for discharge.  Cardiology assessed during the ER and thought elevation of troponin was due to demand ischemia and unrelated to CAD.  Labs-respiratory culture-positive for influenza A.  BNP normal, troponin I elevated at 27 and repeat elevated at 88, procalcitonin 0.28, lactic acid normal.  CMP with sodium 132, chloride 96, calcium 8.8, BUN 25, AST 41.  Urine with positive blood, ketones, protein  Chest x-ray with 31 2204-calcifications thoracic aorta, hazy airspace opacity left lateral midlung field.  Otherwise negative.  CT abdomen pelvis 3/1/2024-few small partially exophytic cysts left kidney, small umbilical hernia, small right femoral hernia, small left elbow hernia containing fat.  Otherwise negative.  Pulmonology-Dr. Colon- follow up 2 weeks. Was not seen by him in hospital.   Drinking 4-5 bottles of water daily.      Nasal skin lesion- skin cancer- had Moh's procedure  Spot on his nose for a few weeks. He tried antibiotic and steroid cream.     Rash- still has spots on arms. Has not received appt for dermatology.   He has spots on arms for a couple months. He has tried antibiotic ointment without relief. He started left arm then went to right arm. Brother said had similar with skin cancer but is extensor surface of the forearms. No other skin lesions. Only on forearms. Not on upper arm.    Patient's Specialists:  Cardiology- Dr Ziegler, Pretty Moscoso, APRN-last appointment 7/2024 for precordial pain, hypertension, dyspnea on exertion, MCKENZIE, hyperlipidemia.  He  had elevated troponin in the setting of pneumonia with normal nuclear stress test 9/2023.  Echo 9/2023 with normal function of her heart.  No 4/2024 with unremarkable troponin.  No current chest pain.  Follow-up 6 months.  7/2023-ordered Lexiscan nuclear stress test.  Advised he could take the lisinopril 5 mg twice daily.  Echo with normal LV function and some age-related changes to aortic valve but everything else seem to be okay.  Negative stress test.  Advised to let her know if any worsening symptoms.  Endocrinology- SHANT Watts-last appointment 6/2024 for hypothyroidism, vitamin D deficiency, dyslipidemia.  Stop Synthroid 112 mcg and given 100 mcg every day.  Continue statin.  Repeat labs 3 months.  Prior change Synthroid to 100 mcg 5 days a week and 112 2 days a week.  Change Livalo to lovastatin since insurance is not covering Livalo. Continue vitamin D 50,000 IU once weekly. Gave glucometer to check glucose after meals if fatigued. Follow-up in 1 year.  Prior SHANT Hawthorne/ Dr West- last appt 12/2018- treating for hypothyroidism, lipid, and vitamin d. Last labs 3/2019- follow up labs 5/20/19 and 7/2019 and follow up with Dr West 7/2019  GI-Dr. Markos Jimenez, SHANT Pereyra-last appointment 6/2024 for GERD and upper abdominal pain.  Discussed recommendation to proceed with pantoprazole 40 mg daily and gastric emptying study to assess for delayed gastric emptying.  He wanted to postpone gastric emptying study but is agreeable to proceed with ultrasound to assess for biliary etiology.  Provided samples of FD guard-1 to 2 capsules prior to meals.  Encouraged smaller, more frequent meals.  Also try diane root over-the-counter supplement 550 mg prior to meals.  Follow-up 4 weeks and if persistent symptoms, consideration of GES testing as well as consider updating EGD.  10/2022 for unexplained epigastric 10 left upper quadrant pain and due for colon cancer screening.  Noted bloating,  early satiety.  Advised pantoprazole 40 mg daily, gastric emptying study, colonoscopy and EGD.  After EGD- treated H. pylori with Protonix 40 mg twice daily for 14 days then once daily, Flagyl 250 mg, tetracycline, bismuth.  Follow-up with nurse practitioner in 4 weeks.  Colonoscopy and EGD performed 12/2022-biopsy positive for H. pylori and tubulovillous adenoma.  Treated and advised recheck urease breath test or stool antigen in 8 weeks, repeat colonoscopy 3 years.    Neurosurgery-Dr. Barbie Hou-last appointment 12/2023 for cervical disc herniation C6-7 with cervical myelopathy.  Discussed severity of disc herniation and compression of the cord with signs of cervical myelopathy including positive Mag sign, bilateral hyperreflexia and tricep reflexes, radiculopathy with pain and tingling but no weakness at the elbow extension weakness with shoulder abduction that could be related to a shoulder injury.  Recommended surgical intervention with discectomy C6-7 and fusion.  Ordered CT scan to evaluate calcification anteriorly.  He was undecided despite strong recommendation for surgery and wanted to wait and see surgeon in early January after CT.  No CT results in the chart.  No follow-up.  Orthopedic surgery-Dr. Clay Santos-last appointment 10/2023 for weakness right upper limb, pain in right shoulder, neck pain.  Appeared he had C5 versus C6 nerve root issue with weakness of the deltoid biceps and supraspinatus and infraspinatus.  Advised MRI cervical spine and follow-up with neurosurgery.  With significant weakness in the right upper extremity, concern for nerve root distribution C5 versus C6.  Continue muscle relaxer and Tylenol.  I also ordered EMG/NCS right upper extremity to include the supraspinatus and infraspinatus as well as deltoid and biceps as well as the remainder of the upper extremity.  Given Medrol Dosepak and ibuprofen 800 mg.  Pulmonology- Dr Colon- last appt in follow-up of emphysema,  pulmonary nodules, bronchiectasis, and periodic limb movement disorder.  CT 5/2021 was stable from 12/2019  They have discussed lung transplant consultation, however, patient does not want to have lung transplant and will not see the specialist at this time.changed Trelegy to Breztri. No other changes. Follow up 3 months.       The following portions of the patient's history were reviewed and updated as appropriate: allergies, current medications, past family history, past medical history, past social history, past surgical history and problem list.    Review of Systems   Respiratory:  Positive for shortness of breath.    Cardiovascular:  Positive for chest pain.   Gastrointestinal:  Positive for abdominal pain.   Neurological:  Positive for numbness.   Hematological:  Bruises/bleeds easily.   All other systems reviewed and are negative.    Objective    Vitals:    03/11/25 1254   BP: 112/68   Pulse: 61   Resp: 16   Temp: 97.6 °F (36.4 °C)   SpO2: 94%     Body mass index is 26.72 kg/m².     Physical Exam   Constitutional: He is oriented to person, place, and time. He appears well-developed. No distress.   HENT:   Head: Normocephalic and atraumatic.   Right Ear: External ear normal.   Left Ear: External ear normal.   Eyes: Conjunctivae are normal.   Neck: Carotid bruit is not present. No tracheal deviation present. No thyroid mass and no thyromegaly present.   Cardiovascular: Normal rate, regular rhythm, normal heart sounds and normal pulses.   Pulmonary/Chest: Effort normal and breath sounds normal.   Abdominal: Normal appearance.   Neurological: He is alert and oriented to person, place, and time. Gait normal.   Skin: Skin is warm and dry.   Psychiatric: His behavior is normal. Mood, judgment and thought content normal.   Nursing note and vitals reviewed.      Assessment & Plan   Diagnoses and all orders for this visit:    1. Medicare annual wellness visit, subsequent (Primary)    2. Encounter for screening for lung  cancer    3. Current chronic use of systemic steroids  -     DEXA Bone Density Axial    4. Primary hypertension  Assessment & Plan:             Orders:  -     Comprehensive Metabolic Panel  -     Urinalysis With Culture If Indicated -    5. Labile blood pressure  -     Comprehensive Metabolic Panel  -     Urinalysis With Culture If Indicated -    6. Other hyperlipidemia  Assessment & Plan:              Orders:  -     Comprehensive Metabolic Panel  -     CK  -     Lipid Panel With LDL / HDL Ratio    7. Hyperglycemia  Assessment & Plan:           Orders:  -     Comprehensive Metabolic Panel  -     Hemoglobin A1c  -     Uric Acid  -     Urinalysis With Culture If Indicated -    8. Vitamin D deficiency  Assessment & Plan:           Orders:  -     Comprehensive Metabolic Panel  -     Vitamin D,25-Hydroxy    9. Acquired hypothyroidism  Assessment & Plan:             10. Leukocytosis, unspecified type  -     CBC & Differential    11. Macrocytosis  -     CBC & Differential  -     Vitamin B12 & Folate  -     Iron and TIBC  -     Ferritin    12. Mixed anxiety depressive disorder  Assessment & Plan:               13. Panic attack  Assessment & Plan:             14. Epigastric pain  Assessment & Plan:             15. Gastroesophageal reflux disease, unspecified whether esophagitis present  Assessment & Plan:             16. History of Helicobacter pylori infection    17. Tubulovillous adenoma of colon    18. Gastrointestinal intolerance to foods    19. Chronic obstructive pulmonary disease, unspecified COPD type  Assessment & Plan:               20. Bronchiectasis with acute lower respiratory infection  Assessment & Plan:             21. Hypoxia  Assessment & Plan:             22. Acute on chronic respiratory failure with hypoxia    23. Oxygen dependent  Assessment & Plan:             24. Pulmonary nodules  Assessment & Plan:             25. Shortness of breath  Assessment & Plan:             26. Chest pain, unspecified  type    27. MCKENZIE (obstructive sleep apnea)  Assessment & Plan:             28. Benign prostatic hyperplasia without lower urinary tract symptoms  Assessment & Plan:             29. Prostate cancer screening  -     PSA Screen    30. Abnormal finding of blood chemistry, unspecified  -     Iron and TIBC  -     Ferritin    Other orders  -     Microscopic Examination -  -     Urine culture, Comprehensive - ,          Assessment and Plan  I reviewed endocrinology records and hospital labs.  Persistently elevated WBC.  I will recheck CBC when he completes labs for cardiology.  Follow-up with me in 6 months for AWV and follow-up of all conditions and specialists.    Chest pain, labile blood pressure-Patient was seen in the emergency department 7/2023 for chest pain with radiation into his shoulders and elevated blood pressure pain was associated with diaphoresis.  He has history of left chest pain then had episode of pain with radiation to bilateral shoulders.  He was seen by cardiology and underwent stress test and echocardiogram that were negative with the exception of some valvular issues.  Continue follow-up with cardiology as directed by them.  Hypertension- Patient to continue medications, monitoring, and follow-up with cardiology as directed by them.   Leukocytosis- He was told this was from chronic prednisone use.  This is definitely a possibility.  I will continue to monitor and make recommendations.  Macrocytosis- Recheck and B12 and folate levels were ok. I will continue to monitor.  Anxiety, adjustment reaction, grief reaction-Patient with episodes of anxiety. He has not necessarily wanted daily medication but something to take if he has an increased anxiety day. Patient was given Atarax as needed. He understands that he cannot drive, lift, work on medication. He should let me know if he is using frequently and we will need to consider daily medication. He was previously on Celexa daily- we could consider this.he  has had increased stress with custody of his 5 grandchildren and caring for his wife who had previous CVA and was paralyzed.  However, she then passed away Easter 2023.  He also had grief reaction from the death of his father.  I discussed with patient consideration of therapy/counseling and medication.  He will let me know if he is willing to consider this.  Skin lesions-He has skin lesions on his arms for months.  He reports his brother had similar lesions and had skin cancer.  There are numerous lesions that are difficult to determine.  There may be some actinic keratosis, however, there is also some component of trauma/picking.  I gave betamethasone to try on some of these areas and referred to dermatology for evaluation and further treatment.  He reports he never received dermatology appointment.  He now has a significant, ulcerated lesion on the right side of his nose.  I will refer again to dermatology and advised he contact us in 1 week if he has not received appointment.  GERD, epigastric pain, LUQ abdominal pain- Patient had improvement in symptoms but has had significant worsening again.  He did have improvement in GI symptoms with Protonix in the hospital.  Continue Protonix 40 mg once daily and follow-up and further workup with GI as directed by them.    Influenza A, acute on chronic respiratory failure with hypoxia, pneumonia, hyponatremia, elevated troponin, elevated liver function test, elevated BUN, abnormal urinalysis- He will continue follow-up with pulmonology.  COPD, pulmonary nodules, bronchiectasis, PLMD- Continue follow up with pulmonology and treatment as directed by them.   Cervical disc disorder with radiculopathy C6-7, weakness, paresthesias/ neuropathy- B12/ folate levels were ok.  We discussed additional workup at visit in the past.  He is now seeing orthopedist who ordered cervical imaging and sent to neurosurgery.  They have recommended cervical intervention with discectomy C6-7 and  fusion.  They ordered CT scan to evaluate and advise surgery.  He wanted to wait until January and see them in follow-up but did not return in January.  He does have compression of the cord with signs of cervical myelopathy including positive Mag sign, bilateral hyperreflexia triceps reflex, radiculopathy with pain and tingling but no weakness with elbow extension, weakness shoulder abduction.  Patient should follow-up with neurosurgery as directed by them.     I spent 30 minutes caring for Nguyễn Santana on this date of service. This time includes time spent by me in the following activities as necessary: preparing for the visit, reviewing tests, specialists records and previous visits, obtaining and/or reviewing a separately obtained history, performing a medically appropriate exam and/or evaluation, counseling and educating the patient, family, caregiver, referring and/or communicating with other healthcare professionals, documenting information in the medical record, independently interpreting results and communicating that information with the patient, family, caregiver, and developing a medically appropriate treatment plan with consideration of other conditions, medications, and treatments.

## 2025-03-11 NOTE — ASSESSMENT & PLAN NOTE
Orders:    Comprehensive Metabolic Panel    Hemoglobin A1c    Uric Acid    Urinalysis With Culture If Indicated -

## 2025-03-12 ENCOUNTER — PATIENT ROUNDING (BHMG ONLY) (OUTPATIENT)
Dept: FAMILY MEDICINE CLINIC | Facility: CLINIC | Age: 65
End: 2025-03-12
Payer: MEDICARE

## 2025-03-12 NOTE — PROGRESS NOTES
"My name is Akbar Abbott     I am the Practice Manager with   [facility] [address] [address] [phone]      I am messaging to ask you about our practice and your recent visit.     Tell me about your visit with us. What things went well?         We're always looking for ways to make our patients' experiences even better. Do you have recommendations on ways we may improve?       Overall were you satisfied with your first visit to our practice?        Is there anything else I can do for you?     Also, please note that you may receive a survey from \"[employer]\" please take time to fill that out, as it provides important feedback for our office.       Thank you, and have a great day.   "

## 2025-03-17 LAB
25(OH)D3+25(OH)D2 SERPL-MCNC: 56.8 NG/ML (ref 30–100)
ALBUMIN SERPL-MCNC: 4.4 G/DL (ref 3.9–4.9)
ALP SERPL-CCNC: 67 IU/L (ref 44–121)
ALT SERPL-CCNC: 24 IU/L (ref 0–44)
APPEARANCE UR: CLEAR
AST SERPL-CCNC: 24 IU/L (ref 0–40)
BACTERIA #/AREA URNS HPF: NORMAL /[HPF]
BACTERIA UR CULT: NORMAL
BACTERIA UR CULT: NORMAL
BASOPHILS # BLD AUTO: 0.1 X10E3/UL (ref 0–0.2)
BASOPHILS NFR BLD AUTO: 1 %
BILIRUB SERPL-MCNC: 0.7 MG/DL (ref 0–1.2)
BILIRUB UR QL STRIP: NEGATIVE
BUN SERPL-MCNC: 16 MG/DL (ref 8–27)
BUN/CREAT SERPL: 15 (ref 10–24)
CALCIUM SERPL-MCNC: 9.1 MG/DL (ref 8.6–10.2)
CASTS URNS QL MICRO: NORMAL /LPF
CHLORIDE SERPL-SCNC: 104 MMOL/L (ref 96–106)
CHOLEST SERPL-MCNC: 193 MG/DL (ref 100–199)
CK SERPL-CCNC: 121 U/L (ref 41–331)
CO2 SERPL-SCNC: 22 MMOL/L (ref 20–29)
COLOR UR: YELLOW
CREAT SERPL-MCNC: 1.09 MG/DL (ref 0.76–1.27)
EGFRCR SERPLBLD CKD-EPI 2021: 77 ML/MIN/1.73
EOSINOPHIL # BLD AUTO: 0.1 X10E3/UL (ref 0–0.4)
EOSINOPHIL NFR BLD AUTO: 1 %
EPI CELLS #/AREA URNS HPF: NORMAL /HPF (ref 0–10)
ERYTHROCYTE [DISTWIDTH] IN BLOOD BY AUTOMATED COUNT: 14 % (ref 11.6–15.4)
FERRITIN SERPL-MCNC: 225 NG/ML (ref 30–400)
FOLATE SERPL-MCNC: 3.1 NG/ML
GLOBULIN SER CALC-MCNC: 1.9 G/DL (ref 1.5–4.5)
GLUCOSE SERPL-MCNC: 70 MG/DL (ref 70–99)
GLUCOSE UR QL STRIP: NEGATIVE
HBA1C MFR BLD: 5.6 % (ref 4.8–5.6)
HCT VFR BLD AUTO: 43.9 % (ref 37.5–51)
HDLC SERPL-MCNC: 62 MG/DL
HGB BLD-MCNC: 14.9 G/DL (ref 13–17.7)
HGB UR QL STRIP: NEGATIVE
IMM GRANULOCYTES # BLD AUTO: 0.1 X10E3/UL (ref 0–0.1)
IMM GRANULOCYTES NFR BLD AUTO: 1 %
IRON SATN MFR SERPL: 42 % (ref 15–55)
IRON SERPL-MCNC: 104 UG/DL (ref 38–169)
KETONES UR QL STRIP: NEGATIVE
LDLC SERPL CALC-MCNC: 109 MG/DL (ref 0–99)
LDLC/HDLC SERPL: 1.8 RATIO (ref 0–3.6)
LEUKOCYTE ESTERASE UR QL STRIP: ABNORMAL
LYMPHOCYTES # BLD AUTO: 2.6 X10E3/UL (ref 0.7–3.1)
LYMPHOCYTES NFR BLD AUTO: 23 %
MCH RBC QN AUTO: 31.7 PG (ref 26.6–33)
MCHC RBC AUTO-ENTMCNC: 33.9 G/DL (ref 31.5–35.7)
MCV RBC AUTO: 93 FL (ref 79–97)
MICRO URNS: ABNORMAL
MONOCYTES # BLD AUTO: 1 X10E3/UL (ref 0.1–0.9)
MONOCYTES NFR BLD AUTO: 9 %
NEUTROPHILS # BLD AUTO: 7.3 X10E3/UL (ref 1.4–7)
NEUTROPHILS NFR BLD AUTO: 65 %
NITRITE UR QL STRIP: NEGATIVE
PH UR STRIP: 6.5 [PH] (ref 5–7.5)
PLATELET # BLD AUTO: 256 X10E3/UL (ref 150–450)
POTASSIUM SERPL-SCNC: 4 MMOL/L (ref 3.5–5.2)
PROT SERPL-MCNC: 6.3 G/DL (ref 6–8.5)
PROT UR QL STRIP: ABNORMAL
PSA SERPL-MCNC: 2.8 NG/ML (ref 0–4)
RBC # BLD AUTO: 4.7 X10E6/UL (ref 4.14–5.8)
RBC #/AREA URNS HPF: NORMAL /HPF (ref 0–2)
SODIUM SERPL-SCNC: 138 MMOL/L (ref 134–144)
SP GR UR STRIP: 1.02 (ref 1–1.03)
TIBC SERPL-MCNC: 245 UG/DL (ref 250–450)
TRIGL SERPL-MCNC: 123 MG/DL (ref 0–149)
UIBC SERPL-MCNC: 141 UG/DL (ref 111–343)
URATE SERPL-MCNC: 3.8 MG/DL (ref 3.8–8.4)
URINALYSIS REFLEX: ABNORMAL
UROBILINOGEN UR STRIP-MCNC: 1 MG/DL (ref 0.2–1)
VIT B12 SERPL-MCNC: 453 PG/ML (ref 232–1245)
VLDLC SERPL CALC-MCNC: 22 MG/DL (ref 5–40)
WBC # BLD AUTO: 11.3 X10E3/UL (ref 3.4–10.8)
WBC #/AREA URNS HPF: NORMAL /HPF (ref 0–5)

## 2025-04-15 ENCOUNTER — HOSPITAL ENCOUNTER (OUTPATIENT)
Dept: BONE DENSITY | Facility: HOSPITAL | Age: 65
Discharge: HOME | End: 2025-04-15
Admitting: PHYSICIAN ASSISTANT
Payer: MEDICARE

## 2025-04-15 PROCEDURE — 77080 DXA BONE DENSITY AXIAL: CPT

## 2025-04-22 DIAGNOSIS — E78.2 MIXED HYPERLIPIDEMIA: ICD-10-CM

## 2025-04-22 RX ORDER — LOVASTATIN 10 MG/1
TABLET ORAL
Qty: 90 TABLET | Refills: 0 | Status: SHIPPED | OUTPATIENT
Start: 2025-04-22

## 2025-04-22 NOTE — TELEPHONE ENCOUNTER
Rx Refill Note  Requested Prescriptions     Pending Prescriptions Disp Refills    lovastatin (MEVACOR) 10 MG tablet [Pharmacy Med Name: LOVASTATIN 10MG TABLETS] 90 tablet 1     Sig: TAKE 1 TABLET BY MOUTH EVERY NIGHT AT BEDTIME FOR HIGH AMOUNT OF FATS IN THE BLOOD      Last office visit with prescribing clinician: 6/12/2024   Last telemedicine visit with prescribing clinician: Visit date not found   Next office visit with prescribing clinician: 6/12/2025                         Would you like a call back once the refill request has been completed: [] Yes [] No    If the office needs to give you a call back, can they leave a voicemail: [] Yes [] No    Monica Todd  04/22/25, 07:56 EDT

## 2025-06-04 RX ORDER — BISOPROLOL FUMARATE 5 MG/1
2.5 TABLET, FILM COATED ORAL DAILY
Start: 2025-06-04

## 2025-06-12 ENCOUNTER — OFFICE VISIT (OUTPATIENT)
Dept: ENDOCRINOLOGY | Age: 65
End: 2025-06-12
Payer: MEDICARE

## 2025-06-12 VITALS
WEIGHT: 178.2 LBS | TEMPERATURE: 97.6 F | HEART RATE: 62 BPM | OXYGEN SATURATION: 95 % | SYSTOLIC BLOOD PRESSURE: 130 MMHG | BODY MASS INDEX: 26.39 KG/M2 | DIASTOLIC BLOOD PRESSURE: 60 MMHG | HEIGHT: 69 IN

## 2025-06-12 DIAGNOSIS — E03.9 ACQUIRED HYPOTHYROIDISM: Primary | ICD-10-CM

## 2025-06-12 DIAGNOSIS — E78.2 MIXED HYPERLIPIDEMIA: ICD-10-CM

## 2025-06-12 RX ORDER — LEVOTHYROXINE SODIUM 100 MCG
100 TABLET ORAL
Qty: 90 TABLET | Refills: 1 | Status: SHIPPED | OUTPATIENT
Start: 2025-06-12

## 2025-06-12 RX ORDER — ROSUVASTATIN CALCIUM 20 MG/1
TABLET, COATED ORAL
Qty: 90 TABLET | Refills: 2 | Status: SHIPPED | OUTPATIENT
Start: 2025-06-12

## 2025-06-12 NOTE — PROGRESS NOTES
"Chief Complaint  Hypothyroidism    Subjective        Nguyễn Santana presents to [facility] END[facility]  History of Present Illness    Hypothyroid, 2014   Currently on Synthroid 100mcg QD, has had issues with pharmacy filling the medication, has been taking 112mcg every other day     When he was on 100mcg QD, he was feeling well without complaints  With his RX being interrupted, he has been feeling hot and cold at times  Otherwise no other concerns or complaints today  Hlp,mixed: lovastatin 10mg daily       Objective   Vital Signs:  /60   Pulse 62   Temp 97.6 °F (36.4 °C) (Oral)   Ht 175.3 cm (69.02\")   Wt 80.8 kg (178 lb 3.2 oz)   SpO2 95%   BMI 26.30 kg/m²   Estimated body mass index is 26.3 kg/m² as calculated from the following:    Height as of this encounter: 175.3 cm (69.02\").    Weight as of this encounter: 80.8 kg (178 lb 3.2 oz).    Physical Exam  Vitals reviewed.   Constitutional:       General: He is not in acute distress.  HENT:      Head: Normocephalic and atraumatic.   Cardiovascular:      Rate and Rhythm: Normal rate.   Pulmonary:      Effort: Pulmonary effort is normal. No respiratory distress.   Musculoskeletal:         General: No signs of injury. Normal range of motion.      Cervical back: Normal range of motion and neck supple.   Skin:     General: Skin is warm and dry.   Neurological:      Mental Status: He is alert and oriented to person, place, and time. Mental status is at baseline.   Psychiatric:         Mood and Affect: Mood normal.         Behavior: Behavior normal.         Thought Content: Thought content normal.         Judgment: Judgment normal.          Result Review :  The following data was reviewed by: SHANT Watts on 06/12/2025:  Common labs          9/12/2024    12:26 3/13/2025    11:11   Common Labs   Glucose  70    BUN  16    Creatinine  1.09    Sodium  138    Potassium  4.0    Chloride  104    Calcium  9.1    Albumin  4.4    Total " Bilirubin  0.7    Alkaline Phosphatase  67    AST (SGOT)  24    ALT (SGPT)  24    WBC 12.42  11.3    Hemoglobin 15.8  14.9    Hematocrit 46.8  43.9    Platelets 287  256    Total Cholesterol  193    Triglycerides  123    HDL Cholesterol  62    LDL Cholesterol   109    Hemoglobin A1C  5.6    PSA  2.8    Uric Acid  3.8                Assessment and Plan   Diagnoses and all orders for this visit:    1. Acquired hypothyroidism (Primary)  -     Synthroid 100 MCG tablet; Take 1 tablet by mouth Every Morning. 100mcg ALL 7 days of the week  Dispense: 90 tablet; Refill: 1  -     Comprehensive Metabolic Panel; Future  -     Lipid Panel; Future  -     TSH; Future  -     T3, Free; Future  -     T4, Free; Future    2. Mixed hyperlipidemia  -     rosuvastatin (CRESTOR) 20 MG tablet; By mouth take 1 tab daily before bed or after evening meal.  Dispense: 90 tablet; Refill: 2             Follow Up   Return in about 1 year (around 6/12/2026).    Resume 100mcg T4 dose QD  Change lovastatin to rosuvastatin, LDL not at goal    Repeat labs in 2 months     Patient was given instructions and counseling regarding his condition or for health maintenance advice. Please see specific information pulled into the AVS if appropriate.     SHANT Watts

## 2025-07-22 ENCOUNTER — OFFICE VISIT (OUTPATIENT)
Age: 65
End: 2025-07-22
Payer: MEDICARE

## 2025-07-22 VITALS
WEIGHT: 179 LBS | SYSTOLIC BLOOD PRESSURE: 116 MMHG | BODY MASS INDEX: 26.51 KG/M2 | HEIGHT: 69 IN | DIASTOLIC BLOOD PRESSURE: 68 MMHG

## 2025-07-22 DIAGNOSIS — I10 PRIMARY HYPERTENSION: ICD-10-CM

## 2025-07-22 DIAGNOSIS — G47.33 OSA (OBSTRUCTIVE SLEEP APNEA): ICD-10-CM

## 2025-07-22 DIAGNOSIS — E78.49 OTHER HYPERLIPIDEMIA: Primary | ICD-10-CM

## 2025-07-22 DIAGNOSIS — J44.9 CHRONIC OBSTRUCTIVE PULMONARY DISEASE, UNSPECIFIED COPD TYPE: ICD-10-CM

## 2025-07-22 PROCEDURE — 93000 ELECTROCARDIOGRAM COMPLETE: CPT | Performed by: INTERNAL MEDICINE

## 2025-07-22 PROCEDURE — 1159F MED LIST DOCD IN RCRD: CPT | Performed by: INTERNAL MEDICINE

## 2025-07-22 PROCEDURE — 1160F RVW MEDS BY RX/DR IN RCRD: CPT | Performed by: INTERNAL MEDICINE

## 2025-07-22 PROCEDURE — 3074F SYST BP LT 130 MM HG: CPT | Performed by: INTERNAL MEDICINE

## 2025-07-22 PROCEDURE — 99214 OFFICE O/P EST MOD 30 MIN: CPT | Performed by: INTERNAL MEDICINE

## 2025-07-22 PROCEDURE — 3078F DIAST BP <80 MM HG: CPT | Performed by: INTERNAL MEDICINE

## 2025-07-22 RX ORDER — BISOPROLOL FUMARATE 5 MG/1
5 TABLET, FILM COATED ORAL DAILY
Qty: 90 TABLET | Refills: 3 | Status: SHIPPED | OUTPATIENT
Start: 2025-07-22

## 2025-07-22 RX ORDER — TIOTROPIUM BROMIDE INHALATION SPRAY 3.12 UG/1
2 SPRAY, METERED RESPIRATORY (INHALATION)
COMMUNITY
Start: 2025-07-19

## 2025-07-22 RX ORDER — AMLODIPINE BESYLATE 5 MG/1
5 TABLET ORAL 2 TIMES DAILY
Qty: 180 TABLET | Refills: 3 | Status: SHIPPED | OUTPATIENT
Start: 2025-07-22

## 2025-07-22 RX ORDER — FLUOROURACIL 50 MG/G
CREAM TOPICAL
COMMUNITY
Start: 2025-05-20

## 2025-07-22 NOTE — PROGRESS NOTES
"      CARDIOLOGY    Luciana Ziegler MD    ENCOUNTER DATE:  07/22/2025    Nguyễn Santana / 64 y.o. / male        CHIEF COMPLAINT / REASON FOR OFFICE VISIT     Hyperlipidemia and Hypertension      HISTORY OF PRESENT ILLNESS       Hyperlipidemia    Hypertension      Nguyễn Santana is a 64 y.o. male     I saw this patient back in 2015.  At that time he had a stress echo which showed normal LV function, no significant valve disease and no evidence of ischemia.     I saw him back in September 2023 due to elevated blood pressure.  He was complaining of numbness down his right arm.     He had an echocardiogram in 09/2023, which showed normal LV function, ejection fraction 61%, trace tricuspid regurgitation with a normal right ventricular systolic pressure. He had a nuclear stress test in 09/2023, which showed normal LV function and no evidence of ischemia.       His blood pressure was elevated when I saw him in March 2024 and I increased the lisinopril to 10 mg twice daily.  He felt like he was having more chest pain with this and went to the emergency room at [facility] in April 2024.  He ruled out for myocardial infarction.  He was told to discontinue lisinopril.  He followed up with one of her nurse practitioners in the office and was started on amlodipine 5 mg a day.         VITAL SIGNS     Visit Vitals  /68 (BP Location: Right arm)   Ht 175.3 cm (69.02\")   Wt 81.2 kg (179 lb)   BMI 26.42 kg/m²         Wt Readings from Last 3 Encounters:   07/22/25 81.2 kg (179 lb)   06/12/25 80.8 kg (178 lb 3.2 oz)   03/11/25 82.1 kg (181 lb)     Body mass index is 26.42 kg/m².      PHYSICAL EXAMINATION     Physical Exam      REVIEWED DATA       ECG 12 Lead    Date/Time: 7/22/2025 2:11 PM  Performed by: Luciana Ziegler MD    Authorized by: Luciana Ziegler MD  Comparison: compared with previous ECG from 1/21/2025  Similar to previous ECG  Rhythm: sinus rhythm  BPM: 54  Conduction: conduction normal  ST " Segments: ST segments normal  T Waves: T waves normal    Clinical impression: normal ECG            Lipid Panel          3/13/2025    11:11   Lipid Panel   Total Cholesterol 193    Triglycerides 123    HDL Cholesterol 62    VLDL Cholesterol 22    LDL Cholesterol  109    LDL/HDL Ratio 1.8        Lab Results   Component Value Date    GLUCOSE 70 03/13/2025    BUN 16 03/13/2025    CREATININE 1.09 03/13/2025     03/13/2025    K 4.0 03/13/2025     03/13/2025    CALCIUM 9.1 03/13/2025    PROTEINTOT 6.3 03/13/2025    ALBUMIN 4.4 03/13/2025    ALT 24 03/13/2025    AST 24 03/13/2025    ALKPHOS 67 03/13/2025    BILITOT 0.7 03/13/2025    GLOB 1.9 03/13/2025    AGRATIO 2.2 03/11/2024    BCR 15 03/13/2025    EGFR 77 03/13/2025       ASSESSMENT & PLAN      Diagnosis Plan   1. Other hyperlipidemia        2. Primary hypertension        3. Chronic obstructive pulmonary disease, unspecified COPD type        4. MCKENZIE (obstructive sleep apnea)            Hypertension.  Renal artery Doppler July 2024 was normal.  Continue bisoprolol 5 mg a day and amlodipine 5 mg twice daily.  I reviewed his blood pressure log from home.    Hyperlipidemia.  He was recently switched from lovastatin to rosuvastatin.  Obstructive sleep apnea.   COPD. Recent pneumonia. Not smoking.  He was tried on steroids but said it made his muscles and joints hurt.  Elevated troponin in the setting of pneumonia.He had a normal nuclear stress test in September 2023.  Echocardiogram September 2023 showed normal LV function, normal right ventricular systolic pressure.  He had a trip to the emergency room with chest pain and hypertension in April 2024.  Troponins were unremarkable.  He is not currently having chest pain or exertional symptoms.    Follow-up in a year unless his symptoms change sooner.    Orders Placed This Encounter   Procedures    ECG 12 Lead     This order was created via procedure documentation     Release to patient:   Routine Release  [[id number]]           MEDICATIONS         Discharge Medications            Accurate as of July 22, 2025  2:12 PM. If you have any questions, ask your nurse or doctor.                Changes to Medications        Instructions Start Date   albuterol (2.5 MG/3ML) 0.083% nebulizer solution  Commonly known as: PROVENTIL  What changed: additional instructions   2.5 mg, Nebulization, 4 Times Daily - RT, Patient will call when needed      albuterol 2 MG tablet  Commonly known as: PROVENTIL  What changed: Another medication with the same name was changed. Make sure you understand how and when to take each.   2 mg, Oral, 4 Times Daily      albuterol sulfate  (90 Base) MCG/ACT inhaler  Commonly known as: PROVENTIL HFA;VENTOLIN HFA;PROAIR HFA  What changed: Another medication with the same name was changed. Make sure you understand how and when to take each.   2 puffs, Inhalation, Every 4 Hours PRN             Continue These Medications        Instructions Start Date   amLODIPine 5 MG tablet  Commonly known as: NORVASC   5 mg, Oral, 2 Times Daily      aspirin 81 MG chewable tablet   81 mg, Daily      betamethasone valerate 0.1 % ointment  Commonly known as: VALISONE   1 application , Topical, 2 Times Daily      bisoprolol 5 MG tablet  Commonly known as: ZEBeta   5 mg, Oral, Daily      Dupixent 300 MG/2ML solution auto-injector injection  Generic drug: Dupilumab   Once      fluorouracil 5 % cream  Commonly known as: EFUDEX   APPLY TOPICALLY TO FOREARMS TWICE DAILY FOR 2 WEEKS      montelukast 10 MG tablet  Commonly known as: SINGULAIR   10 mg, Oral, Daily, Patient will call when needed      Nebulizer device   No dose, route, or frequency recorded.      O2  Commonly known as: OXYGEN   4 L/min, Nightly      pantoprazole 40 MG EC tablet  Commonly known as: Protonix   40 mg, Oral, Daily      predniSONE 10 MG tablet  Commonly known as: DELTASONE   10 mg, Daily      rosuvastatin 20 MG tablet  Commonly known as: CRESTOR   By  mouth take 1 tab daily before bed or after evening meal.      Spiriva Respimat 2.5 MCG/ACT aerosol solution inhaler  Generic drug: tiotropium bromide monohydrate   2 puffs, Daily - RT      Synthroid 100 MCG tablet  Generic drug: levothyroxine   100 mcg, Oral, Every Early Morning, 100mcg ALL 7 days of the week      TUSSIN CF COUGH & COLD PO   Every Morning      vitamin D 1.25 MG (59493 UT) capsule capsule  Commonly known as: ERGOCALCIFEROL   1 capsule, Weekly             Stop These Medications      Breztri Aerosphere 160-9-4.8 MCG/ACT aerosol inhaler  Generic drug: Budeson-Glycopyrrol-Formoterol  Stopped by: Luciana Ziegler MD  07/22/25  14:12 EDT    Part of this note may be an electronic transcription/translation of spoken language to printed text using the Dragon dictation system.

## 2025-07-23 DIAGNOSIS — E78.2 MIXED HYPERLIPIDEMIA: ICD-10-CM

## 2025-07-23 RX ORDER — LOVASTATIN 10 MG/1
TABLET ORAL
Qty: 90 TABLET | Refills: 0 | OUTPATIENT
Start: 2025-07-23

## 2025-07-23 NOTE — TELEPHONE ENCOUNTER
Rx Refill Note  Requested Prescriptions     Pending Prescriptions Disp Refills    lovastatin (MEVACOR) 10 MG tablet [Pharmacy Med Name: LOVASTATIN 10MG TABLETS] 90 tablet 0     Sig: TAKE 1 TABLET BY MOUTH EVERY NIGHT AT BEDTIME FOR HIGH AMOUNT OF FATS IN THE BLOOD      Last office visit with prescribing clinician: 6/12/2025   Last telemedicine visit with prescribing clinician: Visit date not found   Next office visit with prescribing clinician: 6/12/2026                         Would you like a call back once the refill request has been completed: [] Yes [] No    If the office needs to give you a call back, can they leave a voicemail: [] Yes [] No    Marta Villasenor MA  07/23/25, 09:19 EDT

## 2025-07-25 DIAGNOSIS — K90.49 GASTROINTESTINAL INTOLERANCE TO FOODS: ICD-10-CM

## 2025-07-25 RX ORDER — PANTOPRAZOLE SODIUM 40 MG/1
40 TABLET, DELAYED RELEASE ORAL DAILY
Qty: 90 TABLET | Refills: 3 | Status: SHIPPED | OUTPATIENT
Start: 2025-07-25

## 2025-08-19 ENCOUNTER — LAB (OUTPATIENT)
Dept: ENDOCRINOLOGY | Age: 65
End: 2025-08-19
Payer: MEDICARE

## 2025-08-19 DIAGNOSIS — E03.9 ACQUIRED HYPOTHYROIDISM: ICD-10-CM

## 2025-08-20 LAB
ALBUMIN SERPL-MCNC: 4.6 G/DL (ref 3.5–5.2)
ALBUMIN/GLOB SERPL: 2.4 G/DL
ALP SERPL-CCNC: 75 U/L (ref 39–117)
ALT SERPL-CCNC: 17 U/L (ref 1–41)
AST SERPL-CCNC: 19 U/L (ref 1–40)
BILIRUB SERPL-MCNC: 0.9 MG/DL (ref 0–1.2)
BUN SERPL-MCNC: 15 MG/DL (ref 8–23)
BUN/CREAT SERPL: 14.6 (ref 7–25)
CALCIUM SERPL-MCNC: 9.3 MG/DL (ref 8.6–10.5)
CHLORIDE SERPL-SCNC: 103 MMOL/L (ref 98–107)
CHOLEST SERPL-MCNC: 161 MG/DL (ref 0–200)
CO2 SERPL-SCNC: 27.6 MMOL/L (ref 22–29)
CREAT SERPL-MCNC: 1.03 MG/DL (ref 0.76–1.27)
EGFRCR SERPLBLD CKD-EPI 2021: 82.1 ML/MIN/1.73
GLOBULIN SER CALC-MCNC: 1.9 GM/DL
GLUCOSE SERPL-MCNC: 79 MG/DL (ref 65–99)
HDLC SERPL-MCNC: 78 MG/DL (ref 40–60)
IMP & REVIEW OF LAB RESULTS: NORMAL
LDLC SERPL CALC-MCNC: 63 MG/DL (ref 0–100)
POTASSIUM SERPL-SCNC: 4.5 MMOL/L (ref 3.5–5.2)
PROT SERPL-MCNC: 6.5 G/DL (ref 6–8.5)
SODIUM SERPL-SCNC: 141 MMOL/L (ref 136–145)
T3FREE SERPL-MCNC: 2.5 PG/ML (ref 2–4.4)
T4 FREE SERPL-MCNC: 1.78 NG/DL (ref 0.92–1.68)
TRIGL SERPL-MCNC: 112 MG/DL (ref 0–150)
TSH SERPL DL<=0.005 MIU/L-ACNC: 0.74 UIU/ML (ref 0.27–4.2)
VLDLC SERPL CALC-MCNC: 20 MG/DL (ref 5–40)

## (undated) DEVICE — KT ORCA ORCAPOD DISP STRL

## (undated) DEVICE — VIAL FORMALIN CAP 10P 40ML

## (undated) DEVICE — ADAPT CLN BIOGUARD AIR/H2O DISP

## (undated) DEVICE — GOWN ,SIRUS,NONREINFORCED 3XL: Brand: MEDLINE

## (undated) DEVICE — SYR LL 3CC

## (undated) DEVICE — SNAR POLYP CAPTIFLX MICRO OVL 13MM 240CM

## (undated) DEVICE — LAB CORP AGAR SLANT UREA PK/10

## (undated) DEVICE — SAFELINER SUCTION CANISTER 1000CC: Brand: DEROYAL

## (undated) DEVICE — FRCP BX RADJAW4 NDL 2.8 240CM LG OG BX40

## (undated) DEVICE — Device

## (undated) DEVICE — THE BITE BLOCK MAXI, LATEX FREE STRAP IS USED TO PROTECT THE ENDOSCOPE INSERTION TUBE FROM BEING BITTEN BY THE PATIENT.

## (undated) DEVICE — BW-412T DISP COMBO CLEANING BRUSH: Brand: SINGLE USE COMBINATION CLEANING BRUSH

## (undated) DEVICE — SPNG GZ WOVN 4X4IN 12PLY 10/BX STRL